# Patient Record
Sex: MALE | Race: WHITE | NOT HISPANIC OR LATINO | Employment: FULL TIME | URBAN - METROPOLITAN AREA
[De-identification: names, ages, dates, MRNs, and addresses within clinical notes are randomized per-mention and may not be internally consistent; named-entity substitution may affect disease eponyms.]

---

## 2016-12-30 RX ORDER — SODIUM CHLORIDE 9 MG/ML
20 INJECTION, SOLUTION INTRAVENOUS CONTINUOUS
Status: DISCONTINUED | OUTPATIENT
Start: 2017-01-03 | End: 2017-01-06 | Stop reason: HOSPADM

## 2017-01-03 ENCOUNTER — TRANSCRIBE ORDERS (OUTPATIENT)
Dept: ADMINISTRATIVE | Facility: HOSPITAL | Age: 57
End: 2017-01-03

## 2017-01-03 ENCOUNTER — APPOINTMENT (OUTPATIENT)
Dept: LAB | Facility: CLINIC | Age: 57
End: 2017-01-03
Payer: COMMERCIAL

## 2017-01-03 ENCOUNTER — ALLSCRIPTS OFFICE VISIT (OUTPATIENT)
Dept: OTHER | Facility: OTHER | Age: 57
End: 2017-01-03

## 2017-01-03 ENCOUNTER — HOSPITAL ENCOUNTER (OUTPATIENT)
Dept: INFUSION CENTER | Facility: CLINIC | Age: 57
Discharge: HOME | End: 2017-01-03
Payer: COMMERCIAL

## 2017-01-03 VITALS
BODY MASS INDEX: 30.62 KG/M2 | RESPIRATION RATE: 16 BRPM | WEIGHT: 238.6 LBS | TEMPERATURE: 97.8 F | DIASTOLIC BLOOD PRESSURE: 78 MMHG | SYSTOLIC BLOOD PRESSURE: 136 MMHG | HEART RATE: 86 BPM | HEIGHT: 74 IN

## 2017-01-03 DIAGNOSIS — C78.00 MALIGNANT NEOPLASM METASTATIC TO LUNG, UNSPECIFIED LATERALITY (HCC): Primary | ICD-10-CM

## 2017-01-03 DIAGNOSIS — C43.9 MALIGNANT MELANOMA OF SKIN (HCC): ICD-10-CM

## 2017-01-03 DIAGNOSIS — Z79.899 OTHER LONG TERM (CURRENT) DRUG THERAPY: ICD-10-CM

## 2017-01-03 LAB
ALBUMIN SERPL BCP-MCNC: 3.4 G/DL (ref 3.5–5)
ALP SERPL-CCNC: 79 U/L (ref 46–116)
ALT SERPL W P-5'-P-CCNC: 61 U/L (ref 12–78)
AMYLASE SERPL-CCNC: 23 IU/L (ref 25–115)
ANION GAP SERPL CALCULATED.3IONS-SCNC: 8 MMOL/L (ref 4–13)
AST SERPL W P-5'-P-CCNC: 40 U/L (ref 5–45)
BASOPHILS # BLD AUTO: 0.05 THOUSANDS/ΜL (ref 0–0.1)
BASOPHILS NFR BLD AUTO: 1 % (ref 0–1)
BILIRUB SERPL-MCNC: 0.3 MG/DL (ref 0.2–1)
BUN SERPL-MCNC: 9 MG/DL (ref 5–25)
CALCIUM SERPL-MCNC: 8.4 MG/DL (ref 8.3–10.1)
CHLORIDE SERPL-SCNC: 105 MMOL/L (ref 100–108)
CO2 SERPL-SCNC: 29 MMOL/L (ref 21–32)
CREAT SERPL-MCNC: 1.42 MG/DL (ref 0.6–1.3)
EOSINOPHIL # BLD AUTO: 0.76 THOUSAND/ΜL (ref 0–0.61)
EOSINOPHIL NFR BLD AUTO: 8 % (ref 0–6)
ERYTHROCYTE [DISTWIDTH] IN BLOOD BY AUTOMATED COUNT: 14.9 % (ref 11.6–15.1)
ERYTHROCYTE [SEDIMENTATION RATE] IN BLOOD: 5 MM/HOUR (ref 0–10)
GFR SERPL CREATININE-BSD FRML MDRD: 51.4 ML/MIN/1.73SQ M
GLUCOSE SERPL-MCNC: 106 MG/DL (ref 65–140)
HCT VFR BLD AUTO: 43.6 % (ref 36.5–49.3)
HGB BLD-MCNC: 14.3 G/DL (ref 12–17)
LDH SERPL-CCNC: 188 U/L (ref 81–234)
LIPASE SERPL-CCNC: 163 U/L (ref 73–393)
LYMPHOCYTES # BLD AUTO: 1.62 THOUSANDS/ΜL (ref 0.6–4.47)
LYMPHOCYTES NFR BLD AUTO: 18 % (ref 14–44)
MAGNESIUM SERPL-MCNC: 1.9 MG/DL (ref 1.6–2.6)
MCH RBC QN AUTO: 28.8 PG (ref 26.8–34.3)
MCHC RBC AUTO-ENTMCNC: 32.8 G/DL (ref 31.4–37.4)
MCV RBC AUTO: 88 FL (ref 82–98)
MONOCYTES # BLD AUTO: 0.67 THOUSAND/ΜL (ref 0.17–1.22)
MONOCYTES NFR BLD AUTO: 7 % (ref 4–12)
NEUTROPHILS # BLD AUTO: 6.05 THOUSANDS/ΜL (ref 1.85–7.62)
NEUTS SEG NFR BLD AUTO: 66 % (ref 43–75)
PLATELET # BLD AUTO: 353 THOUSANDS/UL (ref 149–390)
PMV BLD AUTO: 10 FL (ref 8.9–12.7)
POTASSIUM SERPL-SCNC: 3.6 MMOL/L (ref 3.5–5.3)
PROT SERPL-MCNC: 6.9 G/DL (ref 6.4–8.2)
RBC # BLD AUTO: 4.97 MILLION/UL (ref 3.88–5.62)
SODIUM SERPL-SCNC: 142 MMOL/L (ref 136–145)
T3FREE SERPL-MCNC: 1.67 PG/ML (ref 2.3–4.2)
T4 FREE SERPL-MCNC: 0.7 NG/DL (ref 0.76–1.46)
TSH SERPL DL<=0.05 MIU/L-ACNC: 34.38 UIU/ML (ref 0.36–3.74)
WBC # BLD AUTO: 9.15 THOUSAND/UL (ref 4.31–10.16)

## 2017-01-03 PROCEDURE — 84439 ASSAY OF FREE THYROXINE: CPT

## 2017-01-03 PROCEDURE — 36415 COLL VENOUS BLD VENIPUNCTURE: CPT

## 2017-01-03 PROCEDURE — 84443 ASSAY THYROID STIM HORMONE: CPT

## 2017-01-03 PROCEDURE — 85652 RBC SED RATE AUTOMATED: CPT

## 2017-01-03 PROCEDURE — 83615 LACTATE (LD) (LDH) ENZYME: CPT

## 2017-01-03 PROCEDURE — 82150 ASSAY OF AMYLASE: CPT

## 2017-01-03 PROCEDURE — 83690 ASSAY OF LIPASE: CPT

## 2017-01-03 PROCEDURE — 96413 CHEMO IV INFUSION 1 HR: CPT

## 2017-01-03 PROCEDURE — 84481 FREE ASSAY (FT-3): CPT

## 2017-01-03 PROCEDURE — 83735 ASSAY OF MAGNESIUM: CPT

## 2017-01-03 PROCEDURE — 85025 COMPLETE CBC W/AUTO DIFF WBC: CPT

## 2017-01-03 PROCEDURE — 80053 COMPREHEN METABOLIC PANEL: CPT

## 2017-01-03 RX ORDER — LEVOTHYROXINE SODIUM 0.2 MG/1
200 TABLET ORAL DAILY
COMMUNITY
End: 2019-10-15

## 2017-01-03 RX ADMIN — SODIUM CHLORIDE 20 ML/HR: 900 INJECTION, SOLUTION INTRAVENOUS at 12:35

## 2017-01-03 RX ADMIN — Medication 327 MG: at 13:15

## 2017-01-03 NOTE — PLAN OF CARE
Problem: Knowledge Deficit  Goal: Patient/family/caregiver demonstrates understanding of disease process, treatment plan, medications, and discharge instructions  Complete learning assessment and assess knowledge base    Interventions:  - Provide teaching at level of understanding  - Provide teaching via preferred learning methods  Outcome: Progressing

## 2017-01-03 NOTE — PROGRESS NOTES
Pt resting with no complaints  Vitals stable; labs within parameters for treatment  MD signed off labs with elevated TSH; pt reports MD is adjusting his levothyroxine  Callbell within reach; will continue to monitor

## 2017-01-10 ENCOUNTER — HOSPITAL ENCOUNTER (OUTPATIENT)
Dept: MRI IMAGING | Facility: HOSPITAL | Age: 57
Discharge: HOME | End: 2017-01-10
Payer: COMMERCIAL

## 2017-01-10 ENCOUNTER — HOSPITAL ENCOUNTER (OUTPATIENT)
Dept: CT IMAGING | Facility: HOSPITAL | Age: 57
Discharge: HOME | End: 2017-01-10
Payer: COMMERCIAL

## 2017-01-10 DIAGNOSIS — C43.9 MALIGNANT MELANOMA OF SKIN (HCC): ICD-10-CM

## 2017-01-10 DIAGNOSIS — C78.00 SECONDARY MALIGNANT NEOPLASM OF LUNG (HCC): ICD-10-CM

## 2017-01-10 DIAGNOSIS — C79.31 SECONDARY MALIGNANT NEOPLASM OF BRAIN (HCC): ICD-10-CM

## 2017-01-10 PROCEDURE — 74176 CT ABD & PELVIS W/O CONTRAST: CPT

## 2017-01-10 PROCEDURE — A9585 GADOBUTROL INJECTION: HCPCS | Performed by: PHYSICIAN ASSISTANT

## 2017-01-10 PROCEDURE — 71250 CT THORAX DX C-: CPT

## 2017-01-10 PROCEDURE — 70553 MRI BRAIN STEM W/O & W/DYE: CPT

## 2017-01-10 RX ADMIN — GADOBUTROL 10 ML: 604.72 INJECTION INTRAVENOUS at 16:22

## 2017-01-17 ENCOUNTER — HOSPITAL ENCOUNTER (OUTPATIENT)
Dept: INFUSION CENTER | Facility: CLINIC | Age: 57
Discharge: HOME | End: 2017-01-17
Payer: COMMERCIAL

## 2017-01-17 ENCOUNTER — ALLSCRIPTS OFFICE VISIT (OUTPATIENT)
Dept: OTHER | Facility: OTHER | Age: 57
End: 2017-01-17

## 2017-01-17 RX ORDER — SODIUM CHLORIDE 9 MG/ML
20 INJECTION, SOLUTION INTRAVENOUS CONTINUOUS
Status: DISCONTINUED | OUTPATIENT
Start: 2017-01-17 | End: 2017-01-20 | Stop reason: HOSPADM

## 2017-01-27 RX ORDER — SODIUM CHLORIDE 9 MG/ML
20 INJECTION, SOLUTION INTRAVENOUS CONTINUOUS
Status: DISCONTINUED | OUTPATIENT
Start: 2017-01-30 | End: 2017-02-02 | Stop reason: HOSPADM

## 2017-01-30 ENCOUNTER — HOSPITAL ENCOUNTER (OUTPATIENT)
Dept: INFUSION CENTER | Facility: CLINIC | Age: 57
Discharge: HOME | End: 2017-01-30
Payer: COMMERCIAL

## 2017-02-13 ENCOUNTER — HOSPITAL ENCOUNTER (OUTPATIENT)
Dept: INFUSION CENTER | Facility: CLINIC | Age: 57
Discharge: HOME | End: 2017-02-13
Payer: COMMERCIAL

## 2017-02-27 ENCOUNTER — ALLSCRIPTS OFFICE VISIT (OUTPATIENT)
Dept: OTHER | Facility: OTHER | Age: 57
End: 2017-02-27

## 2017-03-20 ENCOUNTER — TRANSCRIBE ORDERS (OUTPATIENT)
Dept: ADMINISTRATIVE | Facility: HOSPITAL | Age: 57
End: 2017-03-20

## 2017-03-20 DIAGNOSIS — C79.31 SECONDARY MALIGNANT NEOPLASM OF BRAIN AND SPINAL CORD (HCC): ICD-10-CM

## 2017-03-20 DIAGNOSIS — C79.49 SECONDARY MALIGNANT NEOPLASM OF BRAIN AND SPINAL CORD (HCC): ICD-10-CM

## 2017-03-20 DIAGNOSIS — C43.9 MELANOMA OF SKIN, SITE UNSPECIFIED: Primary | ICD-10-CM

## 2017-03-30 ENCOUNTER — APPOINTMENT (OUTPATIENT)
Dept: LAB | Facility: CLINIC | Age: 57
End: 2017-03-30
Payer: COMMERCIAL

## 2017-03-30 ENCOUNTER — ALLSCRIPTS OFFICE VISIT (OUTPATIENT)
Dept: OTHER | Facility: OTHER | Age: 57
End: 2017-03-30

## 2017-03-30 ENCOUNTER — HOSPITAL ENCOUNTER (OUTPATIENT)
Dept: INFUSION CENTER | Facility: CLINIC | Age: 57
Discharge: HOME | End: 2017-03-30
Payer: COMMERCIAL

## 2017-03-30 VITALS
RESPIRATION RATE: 18 BRPM | WEIGHT: 256 LBS | OXYGEN SATURATION: 95 % | BODY MASS INDEX: 32.87 KG/M2 | SYSTOLIC BLOOD PRESSURE: 124 MMHG | HEART RATE: 93 BPM | TEMPERATURE: 97.9 F | DIASTOLIC BLOOD PRESSURE: 78 MMHG

## 2017-03-30 DIAGNOSIS — C43.9 MALIGNANT MELANOMA OF SKIN (HCC): ICD-10-CM

## 2017-03-30 LAB
ALBUMIN SERPL BCP-MCNC: 3.9 G/DL (ref 3.5–5)
ALP SERPL-CCNC: 68 U/L (ref 46–116)
ALT SERPL W P-5'-P-CCNC: 39 U/L (ref 12–78)
AMYLASE SERPL-CCNC: 36 IU/L (ref 25–115)
ANION GAP SERPL CALCULATED.3IONS-SCNC: 7 MMOL/L (ref 4–13)
AST SERPL W P-5'-P-CCNC: 19 U/L (ref 5–45)
BASOPHILS # BLD AUTO: 0.02 THOUSANDS/ΜL (ref 0–0.1)
BASOPHILS NFR BLD AUTO: 0 % (ref 0–1)
BILIRUB SERPL-MCNC: 0.3 MG/DL (ref 0.2–1)
BUN SERPL-MCNC: 17 MG/DL (ref 5–25)
CALCIUM SERPL-MCNC: 9.7 MG/DL (ref 8.3–10.1)
CHLORIDE SERPL-SCNC: 102 MMOL/L (ref 100–108)
CO2 SERPL-SCNC: 32 MMOL/L (ref 21–32)
CREAT SERPL-MCNC: 1.25 MG/DL (ref 0.6–1.3)
EOSINOPHIL # BLD AUTO: 0.03 THOUSAND/ΜL (ref 0–0.61)
EOSINOPHIL NFR BLD AUTO: 0 % (ref 0–6)
ERYTHROCYTE [DISTWIDTH] IN BLOOD BY AUTOMATED COUNT: 14.3 % (ref 11.6–15.1)
GFR SERPL CREATININE-BSD FRML MDRD: 59.5 ML/MIN/1.73SQ M
GLUCOSE SERPL-MCNC: 96 MG/DL (ref 65–140)
HCT VFR BLD AUTO: 45.1 % (ref 36.5–49.3)
HGB BLD-MCNC: 14.6 G/DL (ref 12–17)
LDH SERPL-CCNC: 300 U/L (ref 81–234)
LIPASE SERPL-CCNC: 206 U/L (ref 73–393)
LYMPHOCYTES # BLD AUTO: 1.42 THOUSANDS/ΜL (ref 0.6–4.47)
LYMPHOCYTES NFR BLD AUTO: 11 % (ref 14–44)
MAGNESIUM SERPL-MCNC: 2 MG/DL (ref 1.6–2.6)
MCH RBC QN AUTO: 29.6 PG (ref 26.8–34.3)
MCHC RBC AUTO-ENTMCNC: 32.4 G/DL (ref 31.4–37.4)
MCV RBC AUTO: 92 FL (ref 82–98)
MONOCYTES # BLD AUTO: 0.8 THOUSAND/ΜL (ref 0.17–1.22)
MONOCYTES NFR BLD AUTO: 6 % (ref 4–12)
NEUTROPHILS # BLD AUTO: 10.25 THOUSANDS/ΜL (ref 1.85–7.62)
NEUTS SEG NFR BLD AUTO: 83 % (ref 43–75)
PLATELET # BLD AUTO: 348 THOUSANDS/UL (ref 149–390)
PMV BLD AUTO: 9.8 FL (ref 8.9–12.7)
POTASSIUM SERPL-SCNC: 4.7 MMOL/L (ref 3.5–5.3)
PROT SERPL-MCNC: 7.4 G/DL (ref 6.4–8.2)
RBC # BLD AUTO: 4.93 MILLION/UL (ref 3.88–5.62)
SODIUM SERPL-SCNC: 141 MMOL/L (ref 136–145)
T3FREE SERPL-MCNC: 2.7 PG/ML (ref 2.3–4.2)
T4 FREE SERPL-MCNC: 0.94 NG/DL (ref 0.76–1.46)
TSH SERPL DL<=0.05 MIU/L-ACNC: 2.2 UIU/ML (ref 0.36–3.74)
WBC # BLD AUTO: 12.52 THOUSAND/UL (ref 4.31–10.16)

## 2017-03-30 PROCEDURE — 84439 ASSAY OF FREE THYROXINE: CPT

## 2017-03-30 PROCEDURE — 84443 ASSAY THYROID STIM HORMONE: CPT

## 2017-03-30 PROCEDURE — 36415 COLL VENOUS BLD VENIPUNCTURE: CPT

## 2017-03-30 PROCEDURE — 96413 CHEMO IV INFUSION 1 HR: CPT

## 2017-03-30 PROCEDURE — 82150 ASSAY OF AMYLASE: CPT

## 2017-03-30 PROCEDURE — 80053 COMPREHEN METABOLIC PANEL: CPT

## 2017-03-30 PROCEDURE — 83615 LACTATE (LD) (LDH) ENZYME: CPT

## 2017-03-30 PROCEDURE — 83690 ASSAY OF LIPASE: CPT

## 2017-03-30 PROCEDURE — 83735 ASSAY OF MAGNESIUM: CPT

## 2017-03-30 PROCEDURE — 84481 FREE ASSAY (FT-3): CPT

## 2017-03-30 PROCEDURE — 85025 COMPLETE CBC W/AUTO DIFF WBC: CPT

## 2017-03-30 RX ORDER — SODIUM CHLORIDE 9 MG/ML
20 INJECTION, SOLUTION INTRAVENOUS CONTINUOUS
Status: DISCONTINUED | OUTPATIENT
Start: 2017-03-30 | End: 2017-04-02 | Stop reason: HOSPADM

## 2017-03-30 RX ADMIN — Medication 348 MG: at 12:23

## 2017-03-30 RX ADMIN — SODIUM CHLORIDE 20 ML/HR: 900 INJECTION, SOLUTION INTRAVENOUS at 11:30

## 2017-04-05 ENCOUNTER — HOSPITAL ENCOUNTER (OUTPATIENT)
Dept: MRI IMAGING | Facility: HOSPITAL | Age: 57
Discharge: HOME | End: 2017-04-05
Payer: COMMERCIAL

## 2017-04-05 ENCOUNTER — HOSPITAL ENCOUNTER (OUTPATIENT)
Dept: CT IMAGING | Facility: HOSPITAL | Age: 57
Discharge: HOME | End: 2017-04-05
Payer: COMMERCIAL

## 2017-04-05 DIAGNOSIS — C78.00 SECONDARY MALIGNANT NEOPLASM OF LUNG (HCC): ICD-10-CM

## 2017-04-05 DIAGNOSIS — C79.31 SECONDARY MALIGNANT NEOPLASM OF BRAIN (HCC): ICD-10-CM

## 2017-04-05 DIAGNOSIS — C43.9 MALIGNANT MELANOMA OF SKIN (HCC): ICD-10-CM

## 2017-04-05 PROCEDURE — 70553 MRI BRAIN STEM W/O & W/DYE: CPT

## 2017-04-05 PROCEDURE — 71250 CT THORAX DX C-: CPT

## 2017-04-05 PROCEDURE — A9585 GADOBUTROL INJECTION: HCPCS | Performed by: PHYSICIAN ASSISTANT

## 2017-04-05 PROCEDURE — 74176 CT ABD & PELVIS W/O CONTRAST: CPT

## 2017-04-05 RX ADMIN — GADOBUTROL 10 ML: 604.72 INJECTION INTRAVENOUS at 15:48

## 2017-04-11 ENCOUNTER — APPOINTMENT (OUTPATIENT)
Dept: LAB | Facility: CLINIC | Age: 57
End: 2017-04-11
Payer: COMMERCIAL

## 2017-04-11 ENCOUNTER — ALLSCRIPTS OFFICE VISIT (OUTPATIENT)
Dept: OTHER | Facility: OTHER | Age: 57
End: 2017-04-11

## 2017-04-11 ENCOUNTER — HOSPITAL ENCOUNTER (OUTPATIENT)
Dept: INFUSION CENTER | Facility: CLINIC | Age: 57
Discharge: HOME | End: 2017-04-11
Payer: COMMERCIAL

## 2017-04-11 VITALS
BODY MASS INDEX: 32.56 KG/M2 | RESPIRATION RATE: 20 BRPM | DIASTOLIC BLOOD PRESSURE: 82 MMHG | SYSTOLIC BLOOD PRESSURE: 142 MMHG | WEIGHT: 253.6 LBS | HEART RATE: 114 BPM | TEMPERATURE: 98.6 F

## 2017-04-11 DIAGNOSIS — C43.9 MALIGNANT MELANOMA OF SKIN (HCC): ICD-10-CM

## 2017-04-11 LAB
ALBUMIN SERPL BCP-MCNC: 3.9 G/DL (ref 3.5–5)
ALP SERPL-CCNC: 68 U/L (ref 46–116)
ALT SERPL W P-5'-P-CCNC: 35 U/L (ref 12–78)
AMYLASE SERPL-CCNC: 33 IU/L (ref 25–115)
ANION GAP SERPL CALCULATED.3IONS-SCNC: 8 MMOL/L (ref 4–13)
AST SERPL W P-5'-P-CCNC: 20 U/L (ref 5–45)
BASOPHILS # BLD AUTO: 0.03 THOUSANDS/ΜL (ref 0–0.1)
BASOPHILS NFR BLD AUTO: 0 % (ref 0–1)
BILIRUB SERPL-MCNC: 0.3 MG/DL (ref 0.2–1)
BUN SERPL-MCNC: 13 MG/DL (ref 5–25)
CALCIUM SERPL-MCNC: 9.4 MG/DL (ref 8.3–10.1)
CHLORIDE SERPL-SCNC: 103 MMOL/L (ref 100–108)
CO2 SERPL-SCNC: 30 MMOL/L (ref 21–32)
CREAT SERPL-MCNC: 1.22 MG/DL (ref 0.6–1.3)
EOSINOPHIL # BLD AUTO: 0.17 THOUSAND/ΜL (ref 0–0.61)
EOSINOPHIL NFR BLD AUTO: 1 % (ref 0–6)
ERYTHROCYTE [DISTWIDTH] IN BLOOD BY AUTOMATED COUNT: 14.3 % (ref 11.6–15.1)
ERYTHROCYTE [SEDIMENTATION RATE] IN BLOOD: 10 MM/HOUR (ref 0–10)
GFR SERPL CREATININE-BSD FRML MDRD: >60 ML/MIN/1.73SQ M
GLUCOSE SERPL-MCNC: 95 MG/DL (ref 65–140)
HCT VFR BLD AUTO: 43.9 % (ref 36.5–49.3)
HGB BLD-MCNC: 14.5 G/DL (ref 12–17)
LDH SERPL-CCNC: 263 U/L (ref 81–234)
LIPASE SERPL-CCNC: 199 U/L (ref 73–393)
LYMPHOCYTES # BLD AUTO: 1.2 THOUSANDS/ΜL (ref 0.6–4.47)
LYMPHOCYTES NFR BLD AUTO: 10 % (ref 14–44)
MAGNESIUM SERPL-MCNC: 2 MG/DL (ref 1.6–2.6)
MCH RBC QN AUTO: 30 PG (ref 26.8–34.3)
MCHC RBC AUTO-ENTMCNC: 33 G/DL (ref 31.4–37.4)
MCV RBC AUTO: 91 FL (ref 82–98)
MONOCYTES # BLD AUTO: 0.91 THOUSAND/ΜL (ref 0.17–1.22)
MONOCYTES NFR BLD AUTO: 7 % (ref 4–12)
NEUTROPHILS # BLD AUTO: 10.18 THOUSANDS/ΜL (ref 1.85–7.62)
NEUTS SEG NFR BLD AUTO: 82 % (ref 43–75)
PLATELET # BLD AUTO: 350 THOUSANDS/UL (ref 149–390)
PMV BLD AUTO: 10.2 FL (ref 8.9–12.7)
POTASSIUM SERPL-SCNC: 4.8 MMOL/L (ref 3.5–5.3)
PROT SERPL-MCNC: 7.6 G/DL (ref 6.4–8.2)
RBC # BLD AUTO: 4.84 MILLION/UL (ref 3.88–5.62)
SODIUM SERPL-SCNC: 141 MMOL/L (ref 136–145)
T3FREE SERPL-MCNC: 3.13 PG/ML (ref 2.3–4.2)
T4 FREE SERPL-MCNC: 1.13 NG/DL (ref 0.76–1.46)
TSH SERPL DL<=0.05 MIU/L-ACNC: 2.22 UIU/ML (ref 0.36–3.74)
WBC # BLD AUTO: 12.49 THOUSAND/UL (ref 4.31–10.16)

## 2017-04-11 PROCEDURE — 83735 ASSAY OF MAGNESIUM: CPT

## 2017-04-11 PROCEDURE — 84481 FREE ASSAY (FT-3): CPT

## 2017-04-11 PROCEDURE — 85652 RBC SED RATE AUTOMATED: CPT

## 2017-04-11 PROCEDURE — 83615 LACTATE (LD) (LDH) ENZYME: CPT

## 2017-04-11 PROCEDURE — 84443 ASSAY THYROID STIM HORMONE: CPT

## 2017-04-11 PROCEDURE — 82150 ASSAY OF AMYLASE: CPT

## 2017-04-11 PROCEDURE — 80053 COMPREHEN METABOLIC PANEL: CPT

## 2017-04-11 PROCEDURE — 36415 COLL VENOUS BLD VENIPUNCTURE: CPT

## 2017-04-11 PROCEDURE — 96413 CHEMO IV INFUSION 1 HR: CPT

## 2017-04-11 PROCEDURE — 85025 COMPLETE CBC W/AUTO DIFF WBC: CPT

## 2017-04-11 PROCEDURE — 84439 ASSAY OF FREE THYROXINE: CPT

## 2017-04-11 PROCEDURE — 83690 ASSAY OF LIPASE: CPT

## 2017-04-11 RX ORDER — PREDNISONE 1 MG/1
5 TABLET ORAL DAILY
COMMUNITY
End: 2017-05-23 | Stop reason: ALTCHOICE

## 2017-04-11 RX ORDER — SODIUM CHLORIDE 9 MG/ML
20 INJECTION, SOLUTION INTRAVENOUS CONTINUOUS
Status: DISCONTINUED | OUTPATIENT
Start: 2017-04-11 | End: 2017-04-14 | Stop reason: HOSPADM

## 2017-04-11 RX ADMIN — SODIUM CHLORIDE 20 ML/HR: 0.9 INJECTION, SOLUTION INTRAVENOUS at 10:45

## 2017-04-11 RX ADMIN — Medication 347 MG: at 11:20

## 2017-04-24 ENCOUNTER — ALLSCRIPTS OFFICE VISIT (OUTPATIENT)
Dept: OTHER | Facility: OTHER | Age: 57
End: 2017-04-24

## 2017-04-24 ENCOUNTER — TRANSCRIBE ORDERS (OUTPATIENT)
Dept: LAB | Facility: CLINIC | Age: 57
End: 2017-04-24

## 2017-04-24 ENCOUNTER — HOSPITAL ENCOUNTER (OUTPATIENT)
Dept: INFUSION CENTER | Facility: CLINIC | Age: 57
Discharge: HOME | End: 2017-04-24
Payer: COMMERCIAL

## 2017-04-24 ENCOUNTER — APPOINTMENT (OUTPATIENT)
Dept: LAB | Facility: CLINIC | Age: 57
End: 2017-04-24
Payer: COMMERCIAL

## 2017-04-24 VITALS
DIASTOLIC BLOOD PRESSURE: 86 MMHG | TEMPERATURE: 98.6 F | WEIGHT: 258.4 LBS | HEART RATE: 100 BPM | BODY MASS INDEX: 33.18 KG/M2 | OXYGEN SATURATION: 95 % | SYSTOLIC BLOOD PRESSURE: 158 MMHG | RESPIRATION RATE: 16 BRPM

## 2017-04-24 DIAGNOSIS — C43.9 MALIGNANT MELANOMA, UNSPECIFIED SITE (HCC): Primary | ICD-10-CM

## 2017-04-24 DIAGNOSIS — C79.49 SECONDARY MALIGNANT NEOPLASM OF BRAIN AND SPINAL CORD (HCC): ICD-10-CM

## 2017-04-24 DIAGNOSIS — C43.9 MALIGNANT MELANOMA OF SKIN (HCC): ICD-10-CM

## 2017-04-24 DIAGNOSIS — C79.31 SECONDARY MALIGNANT NEOPLASM OF BRAIN AND SPINAL CORD (HCC): ICD-10-CM

## 2017-04-24 DIAGNOSIS — C78.00 MALIGNANT NEOPLASM METASTATIC TO LUNG, UNSPECIFIED LATERALITY (HCC): ICD-10-CM

## 2017-04-24 LAB
ALBUMIN SERPL BCP-MCNC: 3.7 G/DL (ref 3.5–5)
ALP SERPL-CCNC: 66 U/L (ref 46–116)
ALT SERPL W P-5'-P-CCNC: 36 U/L (ref 12–78)
AMYLASE SERPL-CCNC: 28 IU/L (ref 25–115)
ANION GAP SERPL CALCULATED.3IONS-SCNC: 9 MMOL/L (ref 4–13)
AST SERPL W P-5'-P-CCNC: 26 U/L (ref 5–45)
BASOPHILS # BLD AUTO: 0.03 THOUSANDS/ΜL (ref 0–0.1)
BASOPHILS NFR BLD AUTO: 0 % (ref 0–1)
BILIRUB SERPL-MCNC: 0.5 MG/DL (ref 0.2–1)
BUN SERPL-MCNC: 10 MG/DL (ref 5–25)
CALCIUM SERPL-MCNC: 9.1 MG/DL (ref 8.3–10.1)
CHLORIDE SERPL-SCNC: 104 MMOL/L (ref 100–108)
CO2 SERPL-SCNC: 28 MMOL/L (ref 21–32)
CREAT SERPL-MCNC: 1.28 MG/DL (ref 0.6–1.3)
EOSINOPHIL # BLD AUTO: 0.39 THOUSAND/ΜL (ref 0–0.61)
EOSINOPHIL NFR BLD AUTO: 5 % (ref 0–6)
ERYTHROCYTE [DISTWIDTH] IN BLOOD BY AUTOMATED COUNT: 13.9 % (ref 11.6–15.1)
ERYTHROCYTE [SEDIMENTATION RATE] IN BLOOD: 17 MM/HOUR (ref 0–10)
GFR SERPL CREATININE-BSD FRML MDRD: 57.9 ML/MIN/1.73SQ M
GLUCOSE SERPL-MCNC: 106 MG/DL (ref 65–140)
HCT VFR BLD AUTO: 42.1 % (ref 36.5–49.3)
HGB BLD-MCNC: 13.9 G/DL (ref 12–17)
LDH SERPL-CCNC: 247 U/L (ref 81–234)
LIPASE SERPL-CCNC: 185 U/L (ref 73–393)
LYMPHOCYTES # BLD AUTO: 1.34 THOUSANDS/ΜL (ref 0.6–4.47)
LYMPHOCYTES NFR BLD AUTO: 17 % (ref 14–44)
MAGNESIUM SERPL-MCNC: 1.9 MG/DL (ref 1.6–2.6)
MCH RBC QN AUTO: 29.8 PG (ref 26.8–34.3)
MCHC RBC AUTO-ENTMCNC: 33 G/DL (ref 31.4–37.4)
MCV RBC AUTO: 90 FL (ref 82–98)
MONOCYTES # BLD AUTO: 1.06 THOUSAND/ΜL (ref 0.17–1.22)
MONOCYTES NFR BLD AUTO: 14 % (ref 4–12)
NEUTROPHILS # BLD AUTO: 5.06 THOUSANDS/ΜL (ref 1.85–7.62)
NEUTS SEG NFR BLD AUTO: 64 % (ref 43–75)
PLATELET # BLD AUTO: 311 THOUSANDS/UL (ref 149–390)
PMV BLD AUTO: 10.3 FL (ref 8.9–12.7)
POTASSIUM SERPL-SCNC: 4.1 MMOL/L (ref 3.5–5.3)
PROT SERPL-MCNC: 7.1 G/DL (ref 6.4–8.2)
RBC # BLD AUTO: 4.67 MILLION/UL (ref 3.88–5.62)
SODIUM SERPL-SCNC: 141 MMOL/L (ref 136–145)
T3FREE SERPL-MCNC: 2.79 PG/ML (ref 2.3–4.2)
T4 FREE SERPL-MCNC: 0.81 NG/DL (ref 0.76–1.46)
TSH SERPL DL<=0.05 MIU/L-ACNC: 4.31 UIU/ML (ref 0.36–3.74)
WBC # BLD AUTO: 7.88 THOUSAND/UL (ref 4.31–10.16)

## 2017-04-24 PROCEDURE — 80053 COMPREHEN METABOLIC PANEL: CPT

## 2017-04-24 PROCEDURE — 84443 ASSAY THYROID STIM HORMONE: CPT

## 2017-04-24 PROCEDURE — 96413 CHEMO IV INFUSION 1 HR: CPT

## 2017-04-24 PROCEDURE — 36415 COLL VENOUS BLD VENIPUNCTURE: CPT

## 2017-04-24 PROCEDURE — 84439 ASSAY OF FREE THYROXINE: CPT

## 2017-04-24 PROCEDURE — 82150 ASSAY OF AMYLASE: CPT

## 2017-04-24 PROCEDURE — 85025 COMPLETE CBC W/AUTO DIFF WBC: CPT

## 2017-04-24 PROCEDURE — 83735 ASSAY OF MAGNESIUM: CPT

## 2017-04-24 PROCEDURE — 85652 RBC SED RATE AUTOMATED: CPT

## 2017-04-24 PROCEDURE — 83615 LACTATE (LD) (LDH) ENZYME: CPT

## 2017-04-24 PROCEDURE — 84481 FREE ASSAY (FT-3): CPT

## 2017-04-24 PROCEDURE — 83690 ASSAY OF LIPASE: CPT

## 2017-04-24 RX ORDER — SODIUM CHLORIDE 9 MG/ML
20 INJECTION, SOLUTION INTRAVENOUS CONTINUOUS
Status: DISCONTINUED | OUTPATIENT
Start: 2017-04-24 | End: 2017-04-27 | Stop reason: HOSPADM

## 2017-04-24 RX ADMIN — SODIUM CHLORIDE 20 ML/HR: 0.9 INJECTION, SOLUTION INTRAVENOUS at 12:22

## 2017-04-24 RX ADMIN — Medication 352 MG: at 13:16

## 2017-04-24 NOTE — PROGRESS NOTES
Pt is here for chemo  He offers no complaints at this time  Labs meet parameters  TSH did increase to 4 3  Notified Trupti Burrell Rn and she stated they are aware and he is ok for treatment today   Pt did see the  this am prior to infusion appointment

## 2017-05-09 ENCOUNTER — HOSPITAL ENCOUNTER (OUTPATIENT)
Dept: INFUSION CENTER | Facility: CLINIC | Age: 57
Discharge: HOME | End: 2017-05-09
Payer: COMMERCIAL

## 2017-05-09 ENCOUNTER — ALLSCRIPTS OFFICE VISIT (OUTPATIENT)
Dept: OTHER | Facility: OTHER | Age: 57
End: 2017-05-09

## 2017-05-09 VITALS
SYSTOLIC BLOOD PRESSURE: 142 MMHG | DIASTOLIC BLOOD PRESSURE: 80 MMHG | WEIGHT: 257.5 LBS | TEMPERATURE: 98 F | HEART RATE: 104 BPM | RESPIRATION RATE: 16 BRPM | BODY MASS INDEX: 33.06 KG/M2 | OXYGEN SATURATION: 96 %

## 2017-05-09 PROCEDURE — 96413 CHEMO IV INFUSION 1 HR: CPT

## 2017-05-09 RX ORDER — SODIUM CHLORIDE 9 MG/ML
20 INJECTION, SOLUTION INTRAVENOUS CONTINUOUS
Status: DISCONTINUED | OUTPATIENT
Start: 2017-05-09 | End: 2017-05-12 | Stop reason: HOSPADM

## 2017-05-09 RX ADMIN — Medication 350 MG: at 13:15

## 2017-05-09 RX ADMIN — SODIUM CHLORIDE 20 ML/HR: 0.9 INJECTION, SOLUTION INTRAVENOUS at 12:00

## 2017-05-23 ENCOUNTER — ALLSCRIPTS OFFICE VISIT (OUTPATIENT)
Dept: OTHER | Facility: OTHER | Age: 57
End: 2017-05-23

## 2017-05-23 ENCOUNTER — HOSPITAL ENCOUNTER (OUTPATIENT)
Dept: INFUSION CENTER | Facility: CLINIC | Age: 57
Discharge: HOME | End: 2017-05-23
Payer: COMMERCIAL

## 2017-05-23 ENCOUNTER — APPOINTMENT (OUTPATIENT)
Dept: LAB | Facility: CLINIC | Age: 57
End: 2017-05-23
Payer: COMMERCIAL

## 2017-05-23 VITALS
HEART RATE: 96 BPM | SYSTOLIC BLOOD PRESSURE: 136 MMHG | WEIGHT: 255.4 LBS | TEMPERATURE: 98.1 F | BODY MASS INDEX: 32.79 KG/M2 | RESPIRATION RATE: 16 BRPM | DIASTOLIC BLOOD PRESSURE: 74 MMHG | OXYGEN SATURATION: 95 %

## 2017-05-23 DIAGNOSIS — C43.9 MALIGNANT MELANOMA OF SKIN (HCC): ICD-10-CM

## 2017-05-23 LAB
ALBUMIN SERPL BCP-MCNC: 3.7 G/DL (ref 3.5–5)
ALP SERPL-CCNC: 72 U/L (ref 46–116)
ALT SERPL W P-5'-P-CCNC: 27 U/L (ref 12–78)
AMYLASE SERPL-CCNC: 30 IU/L (ref 25–115)
ANION GAP SERPL CALCULATED.3IONS-SCNC: 8 MMOL/L (ref 4–13)
AST SERPL W P-5'-P-CCNC: 21 U/L (ref 5–45)
BASOPHILS # BLD AUTO: 0.03 THOUSANDS/ΜL (ref 0–0.1)
BASOPHILS NFR BLD AUTO: 1 % (ref 0–1)
BILIRUB SERPL-MCNC: 0.4 MG/DL (ref 0.2–1)
BUN SERPL-MCNC: 13 MG/DL (ref 5–25)
CALCIUM SERPL-MCNC: 9.2 MG/DL (ref 8.3–10.1)
CHLORIDE SERPL-SCNC: 105 MMOL/L (ref 100–108)
CO2 SERPL-SCNC: 29 MMOL/L (ref 21–32)
CREAT SERPL-MCNC: 1.2 MG/DL (ref 0.6–1.3)
EOSINOPHIL # BLD AUTO: 0.9 THOUSAND/ΜL (ref 0–0.61)
EOSINOPHIL NFR BLD AUTO: 14 % (ref 0–6)
ERYTHROCYTE [DISTWIDTH] IN BLOOD BY AUTOMATED COUNT: 13.1 % (ref 11.6–15.1)
ERYTHROCYTE [SEDIMENTATION RATE] IN BLOOD: 14 MM/HOUR (ref 0–10)
GFR SERPL CREATININE-BSD FRML MDRD: >60 ML/MIN/1.73SQ M
GLUCOSE SERPL-MCNC: 86 MG/DL (ref 65–140)
HCT VFR BLD AUTO: 42.4 % (ref 36.5–49.3)
HGB BLD-MCNC: 14.1 G/DL (ref 12–17)
LDH SERPL-CCNC: 228 U/L (ref 81–234)
LIPASE SERPL-CCNC: 228 U/L (ref 73–393)
LYMPHOCYTES # BLD AUTO: 1.34 THOUSANDS/ΜL (ref 0.6–4.47)
LYMPHOCYTES NFR BLD AUTO: 20 % (ref 14–44)
MAGNESIUM SERPL-MCNC: 2 MG/DL (ref 1.6–2.6)
MCH RBC QN AUTO: 29.5 PG (ref 26.8–34.3)
MCHC RBC AUTO-ENTMCNC: 33.3 G/DL (ref 31.4–37.4)
MCV RBC AUTO: 89 FL (ref 82–98)
MONOCYTES # BLD AUTO: 0.77 THOUSAND/ΜL (ref 0.17–1.22)
MONOCYTES NFR BLD AUTO: 12 % (ref 4–12)
NEUTROPHILS # BLD AUTO: 3.61 THOUSANDS/ΜL (ref 1.85–7.62)
NEUTS SEG NFR BLD AUTO: 53 % (ref 43–75)
PLATELET # BLD AUTO: 319 THOUSANDS/UL (ref 149–390)
PMV BLD AUTO: 10.4 FL (ref 8.9–12.7)
POTASSIUM SERPL-SCNC: 3.9 MMOL/L (ref 3.5–5.3)
PROT SERPL-MCNC: 7.3 G/DL (ref 6.4–8.2)
RBC # BLD AUTO: 4.78 MILLION/UL (ref 3.88–5.62)
SODIUM SERPL-SCNC: 142 MMOL/L (ref 136–145)
T3FREE SERPL-MCNC: 3.24 PG/ML (ref 2.3–4.2)
T4 FREE SERPL-MCNC: 0.91 NG/DL (ref 0.76–1.46)
TSH SERPL DL<=0.05 MIU/L-ACNC: 0.79 UIU/ML (ref 0.36–3.74)
WBC # BLD AUTO: 6.65 THOUSAND/UL (ref 4.31–10.16)

## 2017-05-23 PROCEDURE — 85025 COMPLETE CBC W/AUTO DIFF WBC: CPT

## 2017-05-23 PROCEDURE — 83615 LACTATE (LD) (LDH) ENZYME: CPT

## 2017-05-23 PROCEDURE — 84443 ASSAY THYROID STIM HORMONE: CPT

## 2017-05-23 PROCEDURE — 83690 ASSAY OF LIPASE: CPT

## 2017-05-23 PROCEDURE — 83735 ASSAY OF MAGNESIUM: CPT

## 2017-05-23 PROCEDURE — 85652 RBC SED RATE AUTOMATED: CPT

## 2017-05-23 PROCEDURE — 84439 ASSAY OF FREE THYROXINE: CPT

## 2017-05-23 PROCEDURE — 80053 COMPREHEN METABOLIC PANEL: CPT

## 2017-05-23 PROCEDURE — 84481 FREE ASSAY (FT-3): CPT

## 2017-05-23 PROCEDURE — 82150 ASSAY OF AMYLASE: CPT

## 2017-05-23 PROCEDURE — 96413 CHEMO IV INFUSION 1 HR: CPT

## 2017-05-23 PROCEDURE — 36415 COLL VENOUS BLD VENIPUNCTURE: CPT

## 2017-05-23 RX ORDER — SODIUM CHLORIDE 9 MG/ML
20 INJECTION, SOLUTION INTRAVENOUS CONTINUOUS
Status: DISCONTINUED | OUTPATIENT
Start: 2017-05-23 | End: 2017-05-26 | Stop reason: HOSPADM

## 2017-05-23 RX ADMIN — SODIUM CHLORIDE 20 ML/HR: 0.9 INJECTION, SOLUTION INTRAVENOUS at 11:33

## 2017-05-23 RX ADMIN — Medication 349 MG: at 12:13

## 2017-05-23 NOTE — PROGRESS NOTES
Pt  Tolerated Nivolumab (Clinical Trial) without adverse event  Future appointments reviewed   Declined AVS

## 2017-05-23 NOTE — PLAN OF CARE
Problem: PAIN - ADULT  Goal: Verbalizes/displays adequate comfort level or baseline comfort level  Interventions:  - Encourage patient to monitor pain and request assistance  - Assess pain using appropriate pain scale  - Administer analgesics based on type and severity of pain and evaluate response  - Implement non-pharmacological measures as appropriate and evaluate response  - Consider cultural and social influences on pain and pain management  - Notify physician/advanced practitioner if interventions unsuccessful or patient reports new pain  Outcome: Progressing    Problem: INFECTION - ADULT  Goal: Absence or prevention of progression during hospitalization  INTERVENTIONS:  - Assess and monitor for signs and symptoms of infection  - Monitor lab/diagnostic results  - Monitor all insertion sites, i e  indwelling lines, tubes, and drains  - Monitor endotracheal (as able) and nasal secretions for changes in amount and color  - [place] appropriate cooling/warming therapies per order  - Administer medications as ordered  - Instruct and encourage patient and family to use good hand hygiene technique  - Identify and instruct in appropriate isolation precautions for identified infection/condition  Outcome: Progressing  Goal: Absence of fever/infection during neutropenic period  INTERVENTIONS:  - Monitor WBC  - Implement neutropenic guidelines  Outcome: Progressing    Problem: Knowledge Deficit  Goal: Patient/family/caregiver demonstrates understanding of disease process, treatment plan, medications, and discharge instructions  Complete learning assessment and assess knowledge base    Interventions:  - Provide teaching at level of understanding  - Provide teaching via preferred learning methods  Outcome: Progressing

## 2017-05-23 NOTE — PROGRESS NOTES
Pt  Denies new symptoms or concerns  Labs reviewed and within parameters for treatment today  Spoke with Shreyas VELAZQUEZ to proceed with Trial/Nivolumab at this time

## 2017-06-05 ENCOUNTER — APPOINTMENT (OUTPATIENT)
Dept: LAB | Facility: CLINIC | Age: 57
End: 2017-06-05
Payer: COMMERCIAL

## 2017-06-05 ENCOUNTER — ALLSCRIPTS OFFICE VISIT (OUTPATIENT)
Dept: OTHER | Facility: OTHER | Age: 57
End: 2017-06-05

## 2017-06-05 ENCOUNTER — TRANSCRIBE ORDERS (OUTPATIENT)
Dept: LAB | Facility: CLINIC | Age: 57
End: 2017-06-05

## 2017-06-05 ENCOUNTER — HOSPITAL ENCOUNTER (OUTPATIENT)
Dept: INFUSION CENTER | Facility: CLINIC | Age: 57
Discharge: HOME | End: 2017-06-05
Payer: COMMERCIAL

## 2017-06-05 VITALS
RESPIRATION RATE: 16 BRPM | WEIGHT: 254.2 LBS | BODY MASS INDEX: 32.64 KG/M2 | SYSTOLIC BLOOD PRESSURE: 150 MMHG | HEART RATE: 91 BPM | OXYGEN SATURATION: 96 % | TEMPERATURE: 98.3 F | DIASTOLIC BLOOD PRESSURE: 80 MMHG

## 2017-06-05 DIAGNOSIS — C43.9 MALIGNANT MELANOMA OF SKIN (HCC): ICD-10-CM

## 2017-06-05 LAB
ALBUMIN SERPL BCP-MCNC: 3.6 G/DL (ref 3.5–5)
ALP SERPL-CCNC: 70 U/L (ref 46–116)
ALT SERPL W P-5'-P-CCNC: 25 U/L (ref 12–78)
AMYLASE SERPL-CCNC: 29 IU/L (ref 25–115)
ANION GAP SERPL CALCULATED.3IONS-SCNC: 8 MMOL/L (ref 4–13)
AST SERPL W P-5'-P-CCNC: 15 U/L (ref 5–45)
BASOPHILS # BLD AUTO: 0.04 THOUSANDS/ΜL (ref 0–0.1)
BASOPHILS NFR BLD AUTO: 1 % (ref 0–1)
BILIRUB SERPL-MCNC: 0.2 MG/DL (ref 0.2–1)
BUN SERPL-MCNC: 14 MG/DL (ref 5–25)
CALCIUM SERPL-MCNC: 8.9 MG/DL (ref 8.3–10.1)
CHLORIDE SERPL-SCNC: 107 MMOL/L (ref 100–108)
CO2 SERPL-SCNC: 29 MMOL/L (ref 21–32)
CREAT SERPL-MCNC: 1.21 MG/DL (ref 0.6–1.3)
EOSINOPHIL # BLD AUTO: 0.78 THOUSAND/ΜL (ref 0–0.61)
EOSINOPHIL NFR BLD AUTO: 10 % (ref 0–6)
ERYTHROCYTE [DISTWIDTH] IN BLOOD BY AUTOMATED COUNT: 12.9 % (ref 11.6–15.1)
ERYTHROCYTE [SEDIMENTATION RATE] IN BLOOD: 4 MM/HOUR (ref 0–10)
GFR SERPL CREATININE-BSD FRML MDRD: >60 ML/MIN/1.73SQ M
GLUCOSE SERPL-MCNC: 95 MG/DL (ref 65–140)
HCT VFR BLD AUTO: 42.2 % (ref 36.5–49.3)
HGB BLD-MCNC: 13.7 G/DL (ref 12–17)
LDH SERPL-CCNC: 215 U/L (ref 81–234)
LIPASE SERPL-CCNC: 220 U/L (ref 73–393)
LYMPHOCYTES # BLD AUTO: 1.29 THOUSANDS/ΜL (ref 0.6–4.47)
LYMPHOCYTES NFR BLD AUTO: 17 % (ref 14–44)
MAGNESIUM SERPL-MCNC: 1.9 MG/DL (ref 1.6–2.6)
MCH RBC QN AUTO: 29 PG (ref 26.8–34.3)
MCHC RBC AUTO-ENTMCNC: 32.5 G/DL (ref 31.4–37.4)
MCV RBC AUTO: 89 FL (ref 82–98)
MONOCYTES # BLD AUTO: 0.81 THOUSAND/ΜL (ref 0.17–1.22)
MONOCYTES NFR BLD AUTO: 11 % (ref 4–12)
NEUTROPHILS # BLD AUTO: 4.83 THOUSANDS/ΜL (ref 1.85–7.62)
NEUTS SEG NFR BLD AUTO: 61 % (ref 43–75)
PLATELET # BLD AUTO: 324 THOUSANDS/UL (ref 149–390)
PMV BLD AUTO: 10.1 FL (ref 8.9–12.7)
POTASSIUM SERPL-SCNC: 4.4 MMOL/L (ref 3.5–5.3)
PROT SERPL-MCNC: 6.9 G/DL (ref 6.4–8.2)
RBC # BLD AUTO: 4.72 MILLION/UL (ref 3.88–5.62)
SODIUM SERPL-SCNC: 144 MMOL/L (ref 136–145)
T3FREE SERPL-MCNC: 3.21 PG/ML (ref 2.3–4.2)
T4 FREE SERPL-MCNC: 0.96 NG/DL (ref 0.76–1.46)
TSH SERPL DL<=0.05 MIU/L-ACNC: 0.37 UIU/ML (ref 0.36–3.74)
WBC # BLD AUTO: 7.75 THOUSAND/UL (ref 4.31–10.16)

## 2017-06-05 PROCEDURE — 82150 ASSAY OF AMYLASE: CPT

## 2017-06-05 PROCEDURE — 84443 ASSAY THYROID STIM HORMONE: CPT

## 2017-06-05 PROCEDURE — 83690 ASSAY OF LIPASE: CPT

## 2017-06-05 PROCEDURE — 80053 COMPREHEN METABOLIC PANEL: CPT

## 2017-06-05 PROCEDURE — 83615 LACTATE (LD) (LDH) ENZYME: CPT

## 2017-06-05 PROCEDURE — 84481 FREE ASSAY (FT-3): CPT

## 2017-06-05 PROCEDURE — 84439 ASSAY OF FREE THYROXINE: CPT

## 2017-06-05 PROCEDURE — 85652 RBC SED RATE AUTOMATED: CPT

## 2017-06-05 PROCEDURE — 36415 COLL VENOUS BLD VENIPUNCTURE: CPT

## 2017-06-05 PROCEDURE — 85025 COMPLETE CBC W/AUTO DIFF WBC: CPT

## 2017-06-05 PROCEDURE — 83735 ASSAY OF MAGNESIUM: CPT

## 2017-06-05 PROCEDURE — 96413 CHEMO IV INFUSION 1 HR: CPT

## 2017-06-05 RX ORDER — SODIUM CHLORIDE 9 MG/ML
20 INJECTION, SOLUTION INTRAVENOUS CONTINUOUS
Status: DISCONTINUED | OUTPATIENT
Start: 2017-06-05 | End: 2017-06-08 | Stop reason: HOSPADM

## 2017-06-05 RX ADMIN — Medication 349 MG: at 12:11

## 2017-06-05 RX ADMIN — SODIUM CHLORIDE 20 ML/HR: 900 INJECTION, SOLUTION INTRAVENOUS at 11:15

## 2017-06-05 NOTE — PROGRESS NOTES
Pt resting with no complaints  Vitals stable; labs within parameters for treatment  Pt expressing interest in getting a port secondary to frequently having difficulty obtaining an IV  Per Ann Gonzalez, this will need to be facilitated by pt's primary oncologist  Pt aware and will call  Callbell within reach; will continue to monitor

## 2017-06-17 RX ORDER — SODIUM CHLORIDE 9 MG/ML
20 INJECTION, SOLUTION INTRAVENOUS CONTINUOUS
Status: DISCONTINUED | OUTPATIENT
Start: 2017-06-19 | End: 2017-06-22 | Stop reason: HOSPADM

## 2017-06-19 ENCOUNTER — ALLSCRIPTS OFFICE VISIT (OUTPATIENT)
Dept: OTHER | Facility: OTHER | Age: 57
End: 2017-06-19

## 2017-06-19 ENCOUNTER — HOSPITAL ENCOUNTER (OUTPATIENT)
Dept: INFUSION CENTER | Facility: CLINIC | Age: 57
Discharge: HOME | End: 2017-06-19
Payer: COMMERCIAL

## 2017-06-19 VITALS
HEART RATE: 108 BPM | RESPIRATION RATE: 18 BRPM | OXYGEN SATURATION: 94 % | TEMPERATURE: 98.5 F | WEIGHT: 251.54 LBS | SYSTOLIC BLOOD PRESSURE: 152 MMHG | DIASTOLIC BLOOD PRESSURE: 85 MMHG | BODY MASS INDEX: 32.3 KG/M2

## 2017-06-19 PROCEDURE — 96413 CHEMO IV INFUSION 1 HR: CPT

## 2017-06-19 RX ADMIN — Medication 300 UNITS: at 13:40

## 2017-06-19 RX ADMIN — Medication 342 MG: at 12:35

## 2017-06-19 RX ADMIN — SODIUM CHLORIDE 20 ML/HR: 0.9 INJECTION, SOLUTION INTRAVENOUS at 12:10

## 2017-06-28 ENCOUNTER — HOSPITAL ENCOUNTER (OUTPATIENT)
Dept: CT IMAGING | Facility: HOSPITAL | Age: 57
Discharge: HOME | End: 2017-06-28
Payer: COMMERCIAL

## 2017-06-28 ENCOUNTER — HOSPITAL ENCOUNTER (OUTPATIENT)
Dept: MRI IMAGING | Facility: HOSPITAL | Age: 57
Discharge: HOME | End: 2017-06-28
Payer: COMMERCIAL

## 2017-06-28 DIAGNOSIS — C79.31 SECONDARY MALIGNANT NEOPLASM OF BRAIN (HCC): ICD-10-CM

## 2017-06-28 DIAGNOSIS — C43.9 MALIGNANT MELANOMA OF SKIN (HCC): ICD-10-CM

## 2017-06-28 DIAGNOSIS — C78.00 SECONDARY MALIGNANT NEOPLASM OF LUNG (HCC): ICD-10-CM

## 2017-06-28 PROCEDURE — 74176 CT ABD & PELVIS W/O CONTRAST: CPT

## 2017-06-28 PROCEDURE — 71250 CT THORAX DX C-: CPT

## 2017-06-28 PROCEDURE — A9585 GADOBUTROL INJECTION: HCPCS | Performed by: PHYSICIAN ASSISTANT

## 2017-06-28 PROCEDURE — 70553 MRI BRAIN STEM W/O & W/DYE: CPT

## 2017-06-28 RX ADMIN — GADOBUTROL 10 ML: 604.72 INJECTION INTRAVENOUS at 15:05

## 2017-07-05 ENCOUNTER — ALLSCRIPTS OFFICE VISIT (OUTPATIENT)
Dept: OTHER | Facility: OTHER | Age: 57
End: 2017-07-05

## 2017-07-05 ENCOUNTER — HOSPITAL ENCOUNTER (OUTPATIENT)
Dept: INFUSION CENTER | Facility: CLINIC | Age: 57
Discharge: HOME | End: 2017-07-05
Payer: COMMERCIAL

## 2017-07-05 VITALS
RESPIRATION RATE: 18 BRPM | HEART RATE: 74 BPM | HEIGHT: 73 IN | BODY MASS INDEX: 32.1 KG/M2 | TEMPERATURE: 97.7 F | SYSTOLIC BLOOD PRESSURE: 150 MMHG | DIASTOLIC BLOOD PRESSURE: 71 MMHG | OXYGEN SATURATION: 98 % | WEIGHT: 242.2 LBS

## 2017-07-05 LAB
ALBUMIN SERPL BCP-MCNC: 3.4 G/DL (ref 3.5–5)
ALP SERPL-CCNC: 84 U/L (ref 46–116)
ALT SERPL W P-5'-P-CCNC: 33 U/L (ref 12–78)
AMYLASE SERPL-CCNC: 30 IU/L (ref 25–115)
ANION GAP SERPL CALCULATED.3IONS-SCNC: 9 MMOL/L (ref 4–13)
AST SERPL W P-5'-P-CCNC: 19 U/L (ref 5–45)
BASOPHILS # BLD AUTO: 0.05 THOUSANDS/ΜL (ref 0–0.1)
BASOPHILS NFR BLD AUTO: 1 % (ref 0–1)
BILIRUB SERPL-MCNC: 0.2 MG/DL (ref 0.2–1)
BUN SERPL-MCNC: 12 MG/DL (ref 5–25)
CALCIUM SERPL-MCNC: 8.6 MG/DL (ref 8.3–10.1)
CHLORIDE SERPL-SCNC: 106 MMOL/L (ref 100–108)
CO2 SERPL-SCNC: 27 MMOL/L (ref 21–32)
CREAT SERPL-MCNC: 1.1 MG/DL (ref 0.6–1.3)
EOSINOPHIL # BLD AUTO: 0.73 THOUSAND/ΜL (ref 0–0.61)
EOSINOPHIL NFR BLD AUTO: 9 % (ref 0–6)
ERYTHROCYTE [DISTWIDTH] IN BLOOD BY AUTOMATED COUNT: 12.9 % (ref 11.6–15.1)
ERYTHROCYTE [SEDIMENTATION RATE] IN BLOOD: 8 MM/HOUR (ref 0–10)
GFR SERPL CREATININE-BSD FRML MDRD: >60 ML/MIN/1.73SQ M
GLUCOSE SERPL-MCNC: 90 MG/DL (ref 65–140)
HCT VFR BLD AUTO: 42 % (ref 36.5–49.3)
HGB BLD-MCNC: 13.6 G/DL (ref 12–17)
LDH SERPL-CCNC: 186 U/L (ref 81–234)
LIPASE SERPL-CCNC: 224 U/L (ref 73–393)
LYMPHOCYTES # BLD AUTO: 1.57 THOUSANDS/ΜL (ref 0.6–4.47)
LYMPHOCYTES NFR BLD AUTO: 20 % (ref 14–44)
MAGNESIUM SERPL-MCNC: 1.9 MG/DL (ref 1.6–2.6)
MCH RBC QN AUTO: 28.2 PG (ref 26.8–34.3)
MCHC RBC AUTO-ENTMCNC: 32.4 G/DL (ref 31.4–37.4)
MCV RBC AUTO: 87 FL (ref 82–98)
MONOCYTES # BLD AUTO: 0.68 THOUSAND/ΜL (ref 0.17–1.22)
MONOCYTES NFR BLD AUTO: 9 % (ref 4–12)
NEUTROPHILS # BLD AUTO: 4.9 THOUSANDS/ΜL (ref 1.85–7.62)
NEUTS SEG NFR BLD AUTO: 61 % (ref 43–75)
PLATELET # BLD AUTO: 328 THOUSANDS/UL (ref 149–390)
PMV BLD AUTO: 10.3 FL (ref 8.9–12.7)
POTASSIUM SERPL-SCNC: 4 MMOL/L (ref 3.5–5.3)
PROT SERPL-MCNC: 7 G/DL (ref 6.4–8.2)
RBC # BLD AUTO: 4.83 MILLION/UL (ref 3.88–5.62)
SODIUM SERPL-SCNC: 142 MMOL/L (ref 136–145)
T3FREE SERPL-MCNC: 1.92 PG/ML (ref 2.3–4.2)
T4 FREE SERPL-MCNC: 0.77 NG/DL (ref 0.76–1.46)
T4 FREE SERPL-MCNC: 0.82 NG/DL (ref 0.76–1.46)
TSH SERPL DL<=0.05 MIU/L-ACNC: 6.14 UIU/ML (ref 0.36–3.74)
WBC # BLD AUTO: 7.93 THOUSAND/UL (ref 4.31–10.16)

## 2017-07-05 PROCEDURE — 85025 COMPLETE CBC W/AUTO DIFF WBC: CPT | Performed by: SPECIALIST

## 2017-07-05 PROCEDURE — 85652 RBC SED RATE AUTOMATED: CPT | Performed by: SPECIALIST

## 2017-07-05 PROCEDURE — 80053 COMPREHEN METABOLIC PANEL: CPT | Performed by: SPECIALIST

## 2017-07-05 PROCEDURE — 83735 ASSAY OF MAGNESIUM: CPT | Performed by: SPECIALIST

## 2017-07-05 PROCEDURE — 84443 ASSAY THYROID STIM HORMONE: CPT | Performed by: SPECIALIST

## 2017-07-05 PROCEDURE — 84481 FREE ASSAY (FT-3): CPT | Performed by: SPECIALIST

## 2017-07-05 PROCEDURE — 84439 ASSAY OF FREE THYROXINE: CPT | Performed by: SPECIALIST

## 2017-07-05 PROCEDURE — 82150 ASSAY OF AMYLASE: CPT | Performed by: SPECIALIST

## 2017-07-05 PROCEDURE — 83615 LACTATE (LD) (LDH) ENZYME: CPT | Performed by: SPECIALIST

## 2017-07-05 PROCEDURE — 83690 ASSAY OF LIPASE: CPT | Performed by: SPECIALIST

## 2017-07-05 PROCEDURE — 96413 CHEMO IV INFUSION 1 HR: CPT

## 2017-07-05 RX ORDER — SODIUM CHLORIDE 9 MG/ML
20 INJECTION, SOLUTION INTRAVENOUS CONTINUOUS
Status: DISCONTINUED | OUTPATIENT
Start: 2017-07-05 | End: 2017-07-08 | Stop reason: HOSPADM

## 2017-07-05 RX ADMIN — SODIUM CHLORIDE 20 ML/HR: 0.9 INJECTION, SOLUTION INTRAVENOUS at 11:10

## 2017-07-05 RX ADMIN — Medication 300 UNITS: at 08:57

## 2017-07-05 RX ADMIN — Medication 300 UNITS: at 12:37

## 2017-07-05 RX ADMIN — Medication 332 MG: at 11:36

## 2017-07-05 NOTE — PROGRESS NOTES
Pt resting with no complaints  Vitals stable; labs within parameters for treatment  Tushar Diallo notified that Pt TSH is 6 142  OK to proceed  Callbell within reach; will continue to monitor

## 2017-07-17 ENCOUNTER — ALLSCRIPTS OFFICE VISIT (OUTPATIENT)
Dept: OTHER | Facility: OTHER | Age: 57
End: 2017-07-17

## 2017-07-17 ENCOUNTER — HOSPITAL ENCOUNTER (OUTPATIENT)
Dept: INFUSION CENTER | Facility: CLINIC | Age: 57
Discharge: HOME | End: 2017-07-17
Payer: COMMERCIAL

## 2017-07-17 VITALS
SYSTOLIC BLOOD PRESSURE: 154 MMHG | DIASTOLIC BLOOD PRESSURE: 86 MMHG | BODY MASS INDEX: 32.52 KG/M2 | TEMPERATURE: 98 F | OXYGEN SATURATION: 98 % | WEIGHT: 246.5 LBS | HEART RATE: 82 BPM | RESPIRATION RATE: 20 BRPM

## 2017-07-17 LAB
ALBUMIN SERPL BCP-MCNC: 3.5 G/DL (ref 3.5–5)
ALP SERPL-CCNC: 75 U/L (ref 46–116)
ALT SERPL W P-5'-P-CCNC: 28 U/L (ref 12–78)
AMYLASE SERPL-CCNC: 28 IU/L (ref 25–115)
ANION GAP SERPL CALCULATED.3IONS-SCNC: 8 MMOL/L (ref 4–13)
AST SERPL W P-5'-P-CCNC: 16 U/L (ref 5–45)
BASOPHILS # BLD AUTO: 0.04 THOUSANDS/ΜL (ref 0–0.1)
BASOPHILS NFR BLD AUTO: 1 % (ref 0–1)
BILIRUB SERPL-MCNC: 0.2 MG/DL (ref 0.2–1)
BUN SERPL-MCNC: 13 MG/DL (ref 5–25)
CALCIUM SERPL-MCNC: 8.9 MG/DL (ref 8.3–10.1)
CHLORIDE SERPL-SCNC: 106 MMOL/L (ref 100–108)
CO2 SERPL-SCNC: 28 MMOL/L (ref 21–32)
CREAT SERPL-MCNC: 1.21 MG/DL (ref 0.6–1.3)
EOSINOPHIL # BLD AUTO: 0.9 THOUSAND/ΜL (ref 0–0.61)
EOSINOPHIL NFR BLD AUTO: 14 % (ref 0–6)
ERYTHROCYTE [DISTWIDTH] IN BLOOD BY AUTOMATED COUNT: 13.2 % (ref 11.6–15.1)
ERYTHROCYTE [SEDIMENTATION RATE] IN BLOOD: 4 MM/HOUR (ref 0–10)
GFR SERPL CREATININE-BSD FRML MDRD: >60 ML/MIN/1.73SQ M
GLUCOSE SERPL-MCNC: 106 MG/DL (ref 65–140)
HCT VFR BLD AUTO: 43.3 % (ref 36.5–49.3)
HGB BLD-MCNC: 14 G/DL (ref 12–17)
LDH SERPL-CCNC: 169 U/L (ref 81–234)
LIPASE SERPL-CCNC: 204 U/L (ref 73–393)
LYMPHOCYTES # BLD AUTO: 1.37 THOUSANDS/ΜL (ref 0.6–4.47)
LYMPHOCYTES NFR BLD AUTO: 21 % (ref 14–44)
MAGNESIUM SERPL-MCNC: 1.8 MG/DL (ref 1.6–2.6)
MCH RBC QN AUTO: 28.1 PG (ref 26.8–34.3)
MCHC RBC AUTO-ENTMCNC: 32.3 G/DL (ref 31.4–37.4)
MCV RBC AUTO: 87 FL (ref 82–98)
MONOCYTES # BLD AUTO: 0.64 THOUSAND/ΜL (ref 0.17–1.22)
MONOCYTES NFR BLD AUTO: 10 % (ref 4–12)
NEUTROPHILS # BLD AUTO: 3.6 THOUSANDS/ΜL (ref 1.85–7.62)
NEUTS SEG NFR BLD AUTO: 54 % (ref 43–75)
PLATELET # BLD AUTO: 300 THOUSANDS/UL (ref 149–390)
PMV BLD AUTO: 10.3 FL (ref 8.9–12.7)
POTASSIUM SERPL-SCNC: 4.1 MMOL/L (ref 3.5–5.3)
PROT SERPL-MCNC: 6.7 G/DL (ref 6.4–8.2)
RBC # BLD AUTO: 4.99 MILLION/UL (ref 3.88–5.62)
SODIUM SERPL-SCNC: 142 MMOL/L (ref 136–145)
T3FREE SERPL-MCNC: 2.4 PG/ML (ref 2.3–4.2)
T4 FREE SERPL-MCNC: 0.98 NG/DL (ref 0.76–1.46)
TSH SERPL DL<=0.05 MIU/L-ACNC: 3.17 UIU/ML (ref 0.36–3.74)
WBC # BLD AUTO: 6.55 THOUSAND/UL (ref 4.31–10.16)

## 2017-07-17 PROCEDURE — 84443 ASSAY THYROID STIM HORMONE: CPT | Performed by: PHYSICIAN ASSISTANT

## 2017-07-17 PROCEDURE — 82150 ASSAY OF AMYLASE: CPT | Performed by: PHYSICIAN ASSISTANT

## 2017-07-17 PROCEDURE — 85652 RBC SED RATE AUTOMATED: CPT | Performed by: PHYSICIAN ASSISTANT

## 2017-07-17 PROCEDURE — 84481 FREE ASSAY (FT-3): CPT | Performed by: PHYSICIAN ASSISTANT

## 2017-07-17 PROCEDURE — 85025 COMPLETE CBC W/AUTO DIFF WBC: CPT | Performed by: PHYSICIAN ASSISTANT

## 2017-07-17 PROCEDURE — 83690 ASSAY OF LIPASE: CPT | Performed by: PHYSICIAN ASSISTANT

## 2017-07-17 PROCEDURE — 80053 COMPREHEN METABOLIC PANEL: CPT | Performed by: PHYSICIAN ASSISTANT

## 2017-07-17 PROCEDURE — 83615 LACTATE (LD) (LDH) ENZYME: CPT | Performed by: PHYSICIAN ASSISTANT

## 2017-07-17 PROCEDURE — 83735 ASSAY OF MAGNESIUM: CPT | Performed by: PHYSICIAN ASSISTANT

## 2017-07-17 PROCEDURE — 84439 ASSAY OF FREE THYROXINE: CPT | Performed by: PHYSICIAN ASSISTANT

## 2017-07-17 PROCEDURE — 96413 CHEMO IV INFUSION 1 HR: CPT

## 2017-07-17 RX ORDER — SODIUM CHLORIDE 9 MG/ML
20 INJECTION, SOLUTION INTRAVENOUS CONTINUOUS
Status: DISCONTINUED | OUTPATIENT
Start: 2017-07-17 | End: 2017-07-20 | Stop reason: HOSPADM

## 2017-07-17 RX ADMIN — SODIUM CHLORIDE 20 ML/HR: 0.9 INJECTION, SOLUTION INTRAVENOUS at 11:45

## 2017-07-17 RX ADMIN — Medication 337 MG: at 12:45

## 2017-07-17 RX ADMIN — Medication 300 UNITS: at 13:58

## 2017-07-17 RX ADMIN — Medication 300 UNITS: at 09:10

## 2017-07-17 NOTE — PROGRESS NOTES
Patient arrived for central line bloodwork  Patient offers no complaints  Blood work drawn with [facility] [place] [facility] flushed per protocol  Patient left accessed for treatment later today after his appointment at Dr Galo Baker office  Patient aware of appointment time   Declined AVS

## 2017-07-31 ENCOUNTER — ALLSCRIPTS OFFICE VISIT (OUTPATIENT)
Dept: OTHER | Facility: OTHER | Age: 57
End: 2017-07-31

## 2017-07-31 ENCOUNTER — HOSPITAL ENCOUNTER (OUTPATIENT)
Dept: INFUSION CENTER | Facility: CLINIC | Age: 57
Discharge: HOME | End: 2017-07-31
Payer: COMMERCIAL

## 2017-07-31 VITALS
HEART RATE: 84 BPM | DIASTOLIC BLOOD PRESSURE: 80 MMHG | SYSTOLIC BLOOD PRESSURE: 142 MMHG | TEMPERATURE: 98.6 F | RESPIRATION RATE: 18 BRPM

## 2017-07-31 PROCEDURE — 96413 CHEMO IV INFUSION 1 HR: CPT

## 2017-07-31 RX ORDER — SODIUM CHLORIDE 9 MG/ML
20 INJECTION, SOLUTION INTRAVENOUS CONTINUOUS
Status: DISCONTINUED | OUTPATIENT
Start: 2017-07-31 | End: 2017-08-03 | Stop reason: HOSPADM

## 2017-07-31 RX ADMIN — Medication 300 UNITS: at 12:45

## 2017-07-31 RX ADMIN — Medication 337 MG: at 11:39

## 2017-07-31 RX ADMIN — SODIUM CHLORIDE 20 ML/HR: 0.9 INJECTION, SOLUTION INTRAVENOUS at 10:54

## 2017-07-31 NOTE — PROGRESS NOTES
Pt resting with no complaints  Vitals stable; no labs needed prior to treatment  Callbell within reach; will continue to monitor

## 2017-08-14 ENCOUNTER — HOSPITAL ENCOUNTER (OUTPATIENT)
Dept: INFUSION CENTER | Facility: CLINIC | Age: 57
Discharge: HOME | End: 2017-08-14
Payer: COMMERCIAL

## 2017-08-14 ENCOUNTER — ALLSCRIPTS OFFICE VISIT (OUTPATIENT)
Dept: OTHER | Facility: OTHER | Age: 57
End: 2017-08-14

## 2017-08-14 VITALS — WEIGHT: 247 LBS | BODY MASS INDEX: 32.59 KG/M2

## 2017-08-14 DIAGNOSIS — C43.9 MALIGNANT MELANOMA OF SKIN (HCC): ICD-10-CM

## 2017-08-14 LAB
ALBUMIN SERPL BCP-MCNC: 3.4 G/DL (ref 3.5–5)
ALP SERPL-CCNC: 70 U/L (ref 46–116)
ALT SERPL W P-5'-P-CCNC: 24 U/L (ref 12–78)
AMYLASE SERPL-CCNC: 25 IU/L (ref 25–115)
ANION GAP SERPL CALCULATED.3IONS-SCNC: 8 MMOL/L (ref 4–13)
AST SERPL W P-5'-P-CCNC: 19 U/L (ref 5–45)
BASOPHILS # BLD AUTO: 0.05 THOUSANDS/ΜL (ref 0–0.1)
BASOPHILS NFR BLD AUTO: 1 % (ref 0–1)
BILIRUB SERPL-MCNC: 0.3 MG/DL (ref 0.2–1)
BUN SERPL-MCNC: 15 MG/DL (ref 5–25)
CALCIUM SERPL-MCNC: 8.6 MG/DL (ref 8.3–10.1)
CHLORIDE SERPL-SCNC: 107 MMOL/L (ref 100–108)
CO2 SERPL-SCNC: 27 MMOL/L (ref 21–32)
CREAT SERPL-MCNC: 1.14 MG/DL (ref 0.6–1.3)
EOSINOPHIL # BLD AUTO: 0.84 THOUSAND/ΜL (ref 0–0.61)
EOSINOPHIL NFR BLD AUTO: 12 % (ref 0–6)
ERYTHROCYTE [DISTWIDTH] IN BLOOD BY AUTOMATED COUNT: 14 % (ref 11.6–15.1)
ERYTHROCYTE [SEDIMENTATION RATE] IN BLOOD: 5 MM/HOUR (ref 0–10)
GFR SERPL CREATININE-BSD FRML MDRD: 70 ML/MIN/1.73SQ M
GLUCOSE SERPL-MCNC: 128 MG/DL (ref 65–140)
HCT VFR BLD AUTO: 42 % (ref 36.5–49.3)
HGB BLD-MCNC: 13.7 G/DL (ref 12–17)
LDH SERPL-CCNC: 187 U/L (ref 81–234)
LIPASE SERPL-CCNC: 182 U/L (ref 73–393)
LYMPHOCYTES # BLD AUTO: 1.56 THOUSANDS/ΜL (ref 0.6–4.47)
LYMPHOCYTES NFR BLD AUTO: 22 % (ref 14–44)
MAGNESIUM SERPL-MCNC: 1.8 MG/DL (ref 1.6–2.6)
MCH RBC QN AUTO: 28 PG (ref 26.8–34.3)
MCHC RBC AUTO-ENTMCNC: 32.6 G/DL (ref 31.4–37.4)
MCV RBC AUTO: 86 FL (ref 82–98)
MONOCYTES # BLD AUTO: 0.61 THOUSAND/ΜL (ref 0.17–1.22)
MONOCYTES NFR BLD AUTO: 9 % (ref 4–12)
NEUTROPHILS # BLD AUTO: 3.97 THOUSANDS/ΜL (ref 1.85–7.62)
NEUTS SEG NFR BLD AUTO: 56 % (ref 43–75)
PLATELET # BLD AUTO: 295 THOUSANDS/UL (ref 149–390)
PMV BLD AUTO: 10.3 FL (ref 8.9–12.7)
POTASSIUM SERPL-SCNC: 3.7 MMOL/L (ref 3.5–5.3)
PROT SERPL-MCNC: 6.7 G/DL (ref 6.4–8.2)
RBC # BLD AUTO: 4.9 MILLION/UL (ref 3.88–5.62)
SODIUM SERPL-SCNC: 142 MMOL/L (ref 136–145)
T3FREE SERPL-MCNC: 2.58 PG/ML (ref 2.3–4.2)
T4 FREE SERPL-MCNC: 0.95 NG/DL (ref 0.76–1.46)
TSH SERPL DL<=0.05 MIU/L-ACNC: 0.68 UIU/ML (ref 0.36–3.74)
WBC # BLD AUTO: 7.03 THOUSAND/UL (ref 4.31–10.16)

## 2017-08-14 PROCEDURE — 84481 FREE ASSAY (FT-3): CPT | Performed by: PHYSICIAN ASSISTANT

## 2017-08-14 PROCEDURE — 82150 ASSAY OF AMYLASE: CPT | Performed by: PHYSICIAN ASSISTANT

## 2017-08-14 PROCEDURE — 83690 ASSAY OF LIPASE: CPT | Performed by: PHYSICIAN ASSISTANT

## 2017-08-14 PROCEDURE — 83735 ASSAY OF MAGNESIUM: CPT | Performed by: PHYSICIAN ASSISTANT

## 2017-08-14 PROCEDURE — 85025 COMPLETE CBC W/AUTO DIFF WBC: CPT | Performed by: PHYSICIAN ASSISTANT

## 2017-08-14 PROCEDURE — 85652 RBC SED RATE AUTOMATED: CPT | Performed by: PHYSICIAN ASSISTANT

## 2017-08-14 PROCEDURE — 80053 COMPREHEN METABOLIC PANEL: CPT | Performed by: PHYSICIAN ASSISTANT

## 2017-08-14 PROCEDURE — 83615 LACTATE (LD) (LDH) ENZYME: CPT | Performed by: PHYSICIAN ASSISTANT

## 2017-08-14 PROCEDURE — 96413 CHEMO IV INFUSION 1 HR: CPT

## 2017-08-14 PROCEDURE — 84443 ASSAY THYROID STIM HORMONE: CPT | Performed by: PHYSICIAN ASSISTANT

## 2017-08-14 PROCEDURE — 84439 ASSAY OF FREE THYROXINE: CPT | Performed by: PHYSICIAN ASSISTANT

## 2017-08-14 RX ORDER — SODIUM CHLORIDE 9 MG/ML
20 INJECTION, SOLUTION INTRAVENOUS CONTINUOUS
Status: DISCONTINUED | OUTPATIENT
Start: 2017-08-14 | End: 2017-08-17 | Stop reason: HOSPADM

## 2017-08-14 RX ADMIN — Medication 337 MG: at 12:23

## 2017-08-14 RX ADMIN — Medication 300 UNITS: at 13:40

## 2017-08-14 RX ADMIN — SODIUM CHLORIDE 20 ML/HR: 0.9 INJECTION, SOLUTION INTRAVENOUS at 11:38

## 2017-08-14 RX ADMIN — Medication 300 UNITS: at 09:40

## 2017-08-14 NOTE — PROGRESS NOTES
Labs obtained via port, good blood return noted, flushed per protocol   Returning for tx after doctor's appt

## 2017-08-25 ENCOUNTER — TRANSCRIBE ORDERS (OUTPATIENT)
Dept: ADMINISTRATIVE | Facility: HOSPITAL | Age: 57
End: 2017-08-25

## 2017-08-25 DIAGNOSIS — C78.00 MALIGNANT NEOPLASM METASTATIC TO LUNG, UNSPECIFIED LATERALITY (HCC): Primary | ICD-10-CM

## 2017-08-25 DIAGNOSIS — C79.49 SECONDARY MALIGNANT NEOPLASM OF BRAIN AND SPINAL CORD (HCC): Primary | ICD-10-CM

## 2017-08-25 DIAGNOSIS — C79.31 SECONDARY MALIGNANT NEOPLASM OF BRAIN AND SPINAL CORD (HCC): Primary | ICD-10-CM

## 2017-08-28 ENCOUNTER — HOSPITAL ENCOUNTER (OUTPATIENT)
Dept: INFUSION CENTER | Facility: CLINIC | Age: 57
Discharge: HOME | End: 2017-08-28
Payer: COMMERCIAL

## 2017-08-28 ENCOUNTER — ALLSCRIPTS OFFICE VISIT (OUTPATIENT)
Dept: OTHER | Facility: OTHER | Age: 57
End: 2017-08-28

## 2017-08-28 VITALS — WEIGHT: 246 LBS | BODY MASS INDEX: 32.46 KG/M2

## 2017-08-28 LAB
ALBUMIN SERPL BCP-MCNC: 3.2 G/DL (ref 3.5–5)
ALP SERPL-CCNC: 71 U/L (ref 46–116)
ALT SERPL W P-5'-P-CCNC: 27 U/L (ref 12–78)
AMYLASE SERPL-CCNC: 21 IU/L (ref 25–115)
ANION GAP SERPL CALCULATED.3IONS-SCNC: 9 MMOL/L (ref 4–13)
AST SERPL W P-5'-P-CCNC: 18 U/L (ref 5–45)
BASOPHILS # BLD AUTO: 0.04 THOUSANDS/ΜL (ref 0–0.1)
BASOPHILS NFR BLD AUTO: 1 % (ref 0–1)
BILIRUB SERPL-MCNC: 0.4 MG/DL (ref 0.2–1)
BUN SERPL-MCNC: 14 MG/DL (ref 5–25)
CALCIUM SERPL-MCNC: 8.5 MG/DL (ref 8.3–10.1)
CHLORIDE SERPL-SCNC: 106 MMOL/L (ref 100–108)
CO2 SERPL-SCNC: 25 MMOL/L (ref 21–32)
CREAT SERPL-MCNC: 1.3 MG/DL (ref 0.6–1.3)
EOSINOPHIL # BLD AUTO: 0.75 THOUSAND/ΜL (ref 0–0.61)
EOSINOPHIL NFR BLD AUTO: 9 % (ref 0–6)
ERYTHROCYTE [DISTWIDTH] IN BLOOD BY AUTOMATED COUNT: 14.3 % (ref 11.6–15.1)
ERYTHROCYTE [SEDIMENTATION RATE] IN BLOOD: 4 MM/HOUR (ref 0–10)
GFR SERPL CREATININE-BSD FRML MDRD: 60 ML/MIN/1.73SQ M
GLUCOSE SERPL-MCNC: 172 MG/DL (ref 65–140)
HCT VFR BLD AUTO: 41.9 % (ref 36.5–49.3)
HGB BLD-MCNC: 13.8 G/DL (ref 12–17)
LDH SERPL-CCNC: 171 U/L (ref 81–234)
LIPASE SERPL-CCNC: 148 U/L (ref 73–393)
LYMPHOCYTES # BLD AUTO: 1.63 THOUSANDS/ΜL (ref 0.6–4.47)
LYMPHOCYTES NFR BLD AUTO: 20 % (ref 14–44)
MAGNESIUM SERPL-MCNC: 1.9 MG/DL (ref 1.6–2.6)
MCH RBC QN AUTO: 27.8 PG (ref 26.8–34.3)
MCHC RBC AUTO-ENTMCNC: 32.9 G/DL (ref 31.4–37.4)
MCV RBC AUTO: 85 FL (ref 82–98)
MONOCYTES # BLD AUTO: 0.54 THOUSAND/ΜL (ref 0.17–1.22)
MONOCYTES NFR BLD AUTO: 7 % (ref 4–12)
NEUTROPHILS # BLD AUTO: 5.04 THOUSANDS/ΜL (ref 1.85–7.62)
NEUTS SEG NFR BLD AUTO: 63 % (ref 43–75)
PLATELET # BLD AUTO: 336 THOUSANDS/UL (ref 149–390)
PMV BLD AUTO: 10.2 FL (ref 8.9–12.7)
POTASSIUM SERPL-SCNC: 3.7 MMOL/L (ref 3.5–5.3)
PROT SERPL-MCNC: 6.4 G/DL (ref 6.4–8.2)
RBC # BLD AUTO: 4.96 MILLION/UL (ref 3.88–5.62)
SODIUM SERPL-SCNC: 140 MMOL/L (ref 136–145)
T3FREE SERPL-MCNC: 2.33 PG/ML (ref 2.3–4.2)
T4 FREE SERPL-MCNC: 1 NG/DL (ref 0.76–1.46)
TSH SERPL DL<=0.05 MIU/L-ACNC: 2.35 UIU/ML (ref 0.36–3.74)
WBC # BLD AUTO: 8 THOUSAND/UL (ref 4.31–10.16)

## 2017-08-28 PROCEDURE — 83615 LACTATE (LD) (LDH) ENZYME: CPT | Performed by: SPECIALIST

## 2017-08-28 PROCEDURE — 84481 FREE ASSAY (FT-3): CPT | Performed by: SPECIALIST

## 2017-08-28 PROCEDURE — 80053 COMPREHEN METABOLIC PANEL: CPT | Performed by: SPECIALIST

## 2017-08-28 PROCEDURE — 84439 ASSAY OF FREE THYROXINE: CPT | Performed by: SPECIALIST

## 2017-08-28 PROCEDURE — 83735 ASSAY OF MAGNESIUM: CPT | Performed by: SPECIALIST

## 2017-08-28 PROCEDURE — 84443 ASSAY THYROID STIM HORMONE: CPT | Performed by: SPECIALIST

## 2017-08-28 PROCEDURE — 83690 ASSAY OF LIPASE: CPT | Performed by: SPECIALIST

## 2017-08-28 PROCEDURE — 96413 CHEMO IV INFUSION 1 HR: CPT

## 2017-08-28 PROCEDURE — 85652 RBC SED RATE AUTOMATED: CPT | Performed by: SPECIALIST

## 2017-08-28 PROCEDURE — 82150 ASSAY OF AMYLASE: CPT | Performed by: SPECIALIST

## 2017-08-28 PROCEDURE — 85025 COMPLETE CBC W/AUTO DIFF WBC: CPT | Performed by: SPECIALIST

## 2017-08-28 RX ORDER — SODIUM CHLORIDE 9 MG/ML
20 INJECTION, SOLUTION INTRAVENOUS CONTINUOUS
Status: DISCONTINUED | OUTPATIENT
Start: 2017-08-28 | End: 2017-08-31 | Stop reason: HOSPADM

## 2017-08-28 RX ADMIN — SODIUM CHLORIDE 20 ML/HR: 0.9 INJECTION, SOLUTION INTRAVENOUS at 11:30

## 2017-08-28 RX ADMIN — Medication 337 MG: at 12:27

## 2017-08-28 RX ADMIN — Medication 300 UNITS: at 13:35

## 2017-08-28 RX ADMIN — Medication 300 UNITS: at 09:57

## 2017-08-28 NOTE — PROGRESS NOTES
Patient tolerated treatment today without complications  Patient aware of upcoming appointments   Declined AVS

## 2017-08-28 NOTE — PROGRESS NOTES
Pt is here for labs from his port  He offers no complaints at this time  Labs drawn per dr's orders  Pt has and appointment with Dr Giorgi Hidalgo and will be back for treatment so port remained accessed  Next appointment confirmed   Pt declined avs

## 2017-09-12 ENCOUNTER — HOSPITAL ENCOUNTER (OUTPATIENT)
Dept: INFUSION CENTER | Facility: CLINIC | Age: 57
Discharge: HOME | End: 2017-09-12
Payer: COMMERCIAL

## 2017-09-12 ENCOUNTER — ALLSCRIPTS OFFICE VISIT (OUTPATIENT)
Dept: OTHER | Facility: OTHER | Age: 57
End: 2017-09-12

## 2017-09-12 VITALS
TEMPERATURE: 98 F | DIASTOLIC BLOOD PRESSURE: 78 MMHG | BODY MASS INDEX: 31.7 KG/M2 | SYSTOLIC BLOOD PRESSURE: 148 MMHG | HEIGHT: 74 IN | RESPIRATION RATE: 18 BRPM | HEART RATE: 70 BPM | WEIGHT: 247 LBS

## 2017-09-12 PROCEDURE — 96413 CHEMO IV INFUSION 1 HR: CPT

## 2017-09-12 RX ORDER — SODIUM CHLORIDE 9 MG/ML
20 INJECTION, SOLUTION INTRAVENOUS CONTINUOUS
Status: DISCONTINUED | OUTPATIENT
Start: 2017-09-12 | End: 2017-09-15 | Stop reason: HOSPADM

## 2017-09-12 RX ORDER — FLUTICASONE PROPIONATE 50 MCG
1 SPRAY, SUSPENSION (ML) NASAL DAILY
COMMUNITY
End: 2018-07-25

## 2017-09-12 RX ADMIN — Medication 336 MG: at 16:04

## 2017-09-12 RX ADMIN — SODIUM CHLORIDE 20 ML/HR: 0.9 INJECTION, SOLUTION INTRAVENOUS at 16:03

## 2017-09-12 RX ADMIN — Medication 300 UNITS: at 17:14

## 2017-09-20 ENCOUNTER — HOSPITAL ENCOUNTER (OUTPATIENT)
Dept: CT IMAGING | Facility: HOSPITAL | Age: 57
Discharge: HOME | End: 2017-09-20

## 2017-09-20 ENCOUNTER — HOSPITAL ENCOUNTER (OUTPATIENT)
Dept: MRI IMAGING | Facility: HOSPITAL | Age: 57
Discharge: HOME | End: 2017-09-20

## 2017-09-20 DIAGNOSIS — C43.9 MALIGNANT MELANOMA OF SKIN (HCC): ICD-10-CM

## 2017-09-20 DIAGNOSIS — C78.00 SECONDARY MALIGNANT NEOPLASM OF LUNG (HCC): ICD-10-CM

## 2017-09-20 DIAGNOSIS — C79.31 SECONDARY MALIGNANT NEOPLASM OF BRAIN (HCC): ICD-10-CM

## 2017-09-20 PROCEDURE — 74176 CT ABD & PELVIS W/O CONTRAST: CPT

## 2017-09-20 PROCEDURE — 71250 CT THORAX DX C-: CPT

## 2017-09-20 PROCEDURE — 70553 MRI BRAIN STEM W/O & W/DYE: CPT

## 2017-09-20 PROCEDURE — A9585 GADOBUTROL INJECTION: HCPCS | Performed by: PHYSICIAN ASSISTANT

## 2017-09-20 RX ADMIN — GADOBUTROL 12 ML: 604.72 INJECTION INTRAVENOUS at 11:21

## 2017-09-25 ENCOUNTER — GENERIC CONVERSION - ENCOUNTER (OUTPATIENT)
Dept: OTHER | Facility: OTHER | Age: 57
End: 2017-09-25

## 2017-09-25 ENCOUNTER — HOSPITAL ENCOUNTER (OUTPATIENT)
Dept: INFUSION CENTER | Facility: CLINIC | Age: 57
Discharge: HOME | End: 2017-09-25
Payer: COMMERCIAL

## 2017-09-25 VITALS
WEIGHT: 246 LBS | RESPIRATION RATE: 16 BRPM | SYSTOLIC BLOOD PRESSURE: 135 MMHG | TEMPERATURE: 98.6 F | HEART RATE: 91 BPM | BODY MASS INDEX: 31.58 KG/M2 | OXYGEN SATURATION: 94 % | DIASTOLIC BLOOD PRESSURE: 71 MMHG

## 2017-09-25 LAB
ALBUMIN SERPL BCP-MCNC: 3.3 G/DL (ref 3.5–5)
ALP SERPL-CCNC: 81 U/L (ref 46–116)
ALT SERPL W P-5'-P-CCNC: 27 U/L (ref 12–78)
AMYLASE SERPL-CCNC: 26 IU/L (ref 25–115)
ANION GAP SERPL CALCULATED.3IONS-SCNC: 9 MMOL/L (ref 4–13)
AST SERPL W P-5'-P-CCNC: 17 U/L (ref 5–45)
BASOPHILS # BLD AUTO: 0.09 THOUSANDS/ΜL (ref 0–0.1)
BASOPHILS NFR BLD AUTO: 1 % (ref 0–1)
BILIRUB SERPL-MCNC: 0.3 MG/DL (ref 0.2–1)
BUN SERPL-MCNC: 14 MG/DL (ref 5–25)
CALCIUM SERPL-MCNC: 8.4 MG/DL (ref 8.3–10.1)
CHLORIDE SERPL-SCNC: 105 MMOL/L (ref 100–108)
CO2 SERPL-SCNC: 24 MMOL/L (ref 21–32)
CREAT SERPL-MCNC: 1.4 MG/DL (ref 0.6–1.3)
EOSINOPHIL # BLD AUTO: 0.82 THOUSAND/ΜL (ref 0–0.61)
EOSINOPHIL NFR BLD AUTO: 9 % (ref 0–6)
ERYTHROCYTE [DISTWIDTH] IN BLOOD BY AUTOMATED COUNT: 14.9 % (ref 11.6–15.1)
ERYTHROCYTE [SEDIMENTATION RATE] IN BLOOD: 5 MM/HOUR (ref 0–10)
GFR SERPL CREATININE-BSD FRML MDRD: 55 ML/MIN/1.73SQ M
GLUCOSE SERPL-MCNC: 140 MG/DL (ref 65–140)
HCT VFR BLD AUTO: 41.4 % (ref 36.5–49.3)
HGB BLD-MCNC: 13.4 G/DL (ref 12–17)
LDH SERPL-CCNC: 194 U/L (ref 81–234)
LIPASE SERPL-CCNC: 171 U/L (ref 73–393)
LYMPHOCYTES # BLD AUTO: 1.82 THOUSANDS/ΜL (ref 0.6–4.47)
LYMPHOCYTES NFR BLD AUTO: 20 % (ref 14–44)
MAGNESIUM SERPL-MCNC: 1.7 MG/DL (ref 1.6–2.6)
MCH RBC QN AUTO: 27.9 PG (ref 26.8–34.3)
MCHC RBC AUTO-ENTMCNC: 32.4 G/DL (ref 31.4–37.4)
MCV RBC AUTO: 86 FL (ref 82–98)
MONOCYTES # BLD AUTO: 0.68 THOUSAND/ΜL (ref 0.17–1.22)
MONOCYTES NFR BLD AUTO: 8 % (ref 4–12)
NEUTROPHILS # BLD AUTO: 5.56 THOUSANDS/ΜL (ref 1.85–7.62)
NEUTS SEG NFR BLD AUTO: 62 % (ref 43–75)
PLATELET # BLD AUTO: 324 THOUSANDS/UL (ref 149–390)
PMV BLD AUTO: 10.1 FL (ref 8.9–12.7)
POTASSIUM SERPL-SCNC: 3.7 MMOL/L (ref 3.5–5.3)
PROT SERPL-MCNC: 6.4 G/DL (ref 6.4–8.2)
RBC # BLD AUTO: 4.81 MILLION/UL (ref 3.88–5.62)
SODIUM SERPL-SCNC: 138 MMOL/L (ref 136–145)
T3FREE SERPL-MCNC: 2.46 PG/ML (ref 2.3–4.2)
T4 FREE SERPL-MCNC: 0.95 NG/DL (ref 0.76–1.46)
TSH SERPL DL<=0.05 MIU/L-ACNC: 1.92 UIU/ML (ref 0.36–3.74)
WBC # BLD AUTO: 8.97 THOUSAND/UL (ref 4.31–10.16)

## 2017-09-25 PROCEDURE — 80053 COMPREHEN METABOLIC PANEL: CPT | Performed by: SPECIALIST

## 2017-09-25 PROCEDURE — 96413 CHEMO IV INFUSION 1 HR: CPT

## 2017-09-25 PROCEDURE — 83615 LACTATE (LD) (LDH) ENZYME: CPT | Performed by: SPECIALIST

## 2017-09-25 PROCEDURE — 83690 ASSAY OF LIPASE: CPT | Performed by: SPECIALIST

## 2017-09-25 PROCEDURE — 85652 RBC SED RATE AUTOMATED: CPT | Performed by: SPECIALIST

## 2017-09-25 PROCEDURE — 83735 ASSAY OF MAGNESIUM: CPT | Performed by: SPECIALIST

## 2017-09-25 PROCEDURE — 85025 COMPLETE CBC W/AUTO DIFF WBC: CPT | Performed by: SPECIALIST

## 2017-09-25 PROCEDURE — 82150 ASSAY OF AMYLASE: CPT | Performed by: SPECIALIST

## 2017-09-25 PROCEDURE — 84439 ASSAY OF FREE THYROXINE: CPT | Performed by: SPECIALIST

## 2017-09-25 PROCEDURE — 84443 ASSAY THYROID STIM HORMONE: CPT | Performed by: SPECIALIST

## 2017-09-25 PROCEDURE — 84481 FREE ASSAY (FT-3): CPT | Performed by: SPECIALIST

## 2017-09-25 RX ORDER — SODIUM CHLORIDE 9 MG/ML
20 INJECTION, SOLUTION INTRAVENOUS CONTINUOUS
Status: DISCONTINUED | OUTPATIENT
Start: 2017-09-25 | End: 2017-09-28 | Stop reason: HOSPADM

## 2017-09-25 RX ADMIN — Medication 335 MG: at 12:03

## 2017-09-25 RX ADMIN — Medication 300 UNITS: at 14:15

## 2017-09-25 RX ADMIN — SODIUM CHLORIDE 20 ML/HR: 0.9 INJECTION, SOLUTION INTRAVENOUS at 11:20

## 2017-09-25 NOTE — PLAN OF CARE
Problem: Potential for Falls  Goal: Patient will remain free of falls  INTERVENTIONS:  - Assess patient frequently for physical needs  -  Identify cognitive and physical deficits and behaviors that affect risk of falls    -  [place] fall precautions as indicated by assessment   - Educate patient/family on patient safety including physical limitations  - Instruct patient to call for assistance with activity based on assessment  - Modify environment to reduce risk of injury  - Consider OT/PT consult to assist with strengthening/mobility   Outcome: Progressing

## 2017-09-25 NOTE — PROGRESS NOTES
Patient here for labs in prep for treatment and f/u downstairs  He tolerated lab draw well and discharged  He will RTO if cleared for treatment 1030  He offers no c/o, has been doing well

## 2017-10-10 ENCOUNTER — HOSPITAL ENCOUNTER (OUTPATIENT)
Dept: INFUSION CENTER | Facility: CLINIC | Age: 57
Discharge: HOME | End: 2017-10-10
Payer: COMMERCIAL

## 2017-10-10 ENCOUNTER — GENERIC CONVERSION - ENCOUNTER (OUTPATIENT)
Dept: OTHER | Facility: OTHER | Age: 57
End: 2017-10-10

## 2017-10-10 VITALS
TEMPERATURE: 98 F | RESPIRATION RATE: 18 BRPM | HEART RATE: 98 BPM | HEIGHT: 74 IN | WEIGHT: 243.5 LBS | DIASTOLIC BLOOD PRESSURE: 84 MMHG | BODY MASS INDEX: 31.25 KG/M2 | SYSTOLIC BLOOD PRESSURE: 122 MMHG | OXYGEN SATURATION: 99 %

## 2017-10-10 LAB
ALBUMIN SERPL BCP-MCNC: 3.4 G/DL (ref 3.5–5)
ALP SERPL-CCNC: 78 U/L (ref 46–116)
ALT SERPL W P-5'-P-CCNC: 31 U/L (ref 12–78)
AMYLASE SERPL-CCNC: 24 IU/L (ref 25–115)
ANION GAP SERPL CALCULATED.3IONS-SCNC: 8 MMOL/L (ref 4–13)
AST SERPL W P-5'-P-CCNC: 20 U/L (ref 5–45)
BASOPHILS # BLD AUTO: 0.04 THOUSANDS/ΜL (ref 0–0.1)
BASOPHILS NFR BLD AUTO: 1 % (ref 0–1)
BILIRUB SERPL-MCNC: 0.5 MG/DL (ref 0.2–1)
BUN SERPL-MCNC: 14 MG/DL (ref 5–25)
CALCIUM SERPL-MCNC: 8.8 MG/DL (ref 8.3–10.1)
CHLORIDE SERPL-SCNC: 105 MMOL/L (ref 100–108)
CO2 SERPL-SCNC: 26 MMOL/L (ref 21–32)
CREAT SERPL-MCNC: 1.28 MG/DL (ref 0.6–1.3)
EOSINOPHIL # BLD AUTO: 0.78 THOUSAND/ΜL (ref 0–0.61)
EOSINOPHIL NFR BLD AUTO: 11 % (ref 0–6)
ERYTHROCYTE [DISTWIDTH] IN BLOOD BY AUTOMATED COUNT: 15.1 % (ref 11.6–15.1)
ERYTHROCYTE [SEDIMENTATION RATE] IN BLOOD: 9 MM/HOUR (ref 0–10)
GFR SERPL CREATININE-BSD FRML MDRD: 61 ML/MIN/1.73SQ M
GLUCOSE SERPL-MCNC: 122 MG/DL (ref 65–140)
HCT VFR BLD AUTO: 41.4 % (ref 36.5–49.3)
HGB BLD-MCNC: 13.3 G/DL (ref 12–17)
LDH SERPL-CCNC: 202 U/L (ref 81–234)
LIPASE SERPL-CCNC: 154 U/L (ref 73–393)
LYMPHOCYTES # BLD AUTO: 1.65 THOUSANDS/ΜL (ref 0.6–4.47)
LYMPHOCYTES NFR BLD AUTO: 23 % (ref 14–44)
MAGNESIUM SERPL-MCNC: 2.1 MG/DL (ref 1.6–2.6)
MCH RBC QN AUTO: 28.1 PG (ref 26.8–34.3)
MCHC RBC AUTO-ENTMCNC: 32.1 G/DL (ref 31.4–37.4)
MCV RBC AUTO: 88 FL (ref 82–98)
MONOCYTES # BLD AUTO: 0.67 THOUSAND/ΜL (ref 0.17–1.22)
MONOCYTES NFR BLD AUTO: 9 % (ref 4–12)
NEUTROPHILS # BLD AUTO: 4.04 THOUSANDS/ΜL (ref 1.85–7.62)
NEUTS SEG NFR BLD AUTO: 56 % (ref 43–75)
PLATELET # BLD AUTO: 336 THOUSANDS/UL (ref 149–390)
PMV BLD AUTO: 9.9 FL (ref 8.9–12.7)
POTASSIUM SERPL-SCNC: 3.8 MMOL/L (ref 3.5–5.3)
PROT SERPL-MCNC: 6.5 G/DL (ref 6.4–8.2)
RBC # BLD AUTO: 4.73 MILLION/UL (ref 3.88–5.62)
SODIUM SERPL-SCNC: 139 MMOL/L (ref 136–145)
T3FREE SERPL-MCNC: 2.05 PG/ML (ref 2.3–4.2)
T4 FREE SERPL-MCNC: 0.92 NG/DL (ref 0.76–1.46)
T4 FREE SERPL-MCNC: 0.93 NG/DL (ref 0.76–1.46)
TSH SERPL DL<=0.05 MIU/L-ACNC: 6.69 UIU/ML (ref 0.36–3.74)
WBC # BLD AUTO: 7.18 THOUSAND/UL (ref 4.31–10.16)

## 2017-10-10 PROCEDURE — J9299Q0 INV NIVOLUMAB BMS-936558 10 MG/ML 10 ML: Performed by: SPECIALIST

## 2017-10-10 PROCEDURE — 84443 ASSAY THYROID STIM HORMONE: CPT | Performed by: SPECIALIST

## 2017-10-10 PROCEDURE — 85025 COMPLETE CBC W/AUTO DIFF WBC: CPT | Performed by: SPECIALIST

## 2017-10-10 PROCEDURE — 80053 COMPREHEN METABOLIC PANEL: CPT | Performed by: SPECIALIST

## 2017-10-10 PROCEDURE — 83690 ASSAY OF LIPASE: CPT | Performed by: SPECIALIST

## 2017-10-10 PROCEDURE — 82150 ASSAY OF AMYLASE: CPT | Performed by: SPECIALIST

## 2017-10-10 PROCEDURE — 84439 ASSAY OF FREE THYROXINE: CPT | Performed by: SPECIALIST

## 2017-10-10 PROCEDURE — 96413 CHEMO IV INFUSION 1 HR: CPT

## 2017-10-10 PROCEDURE — 83735 ASSAY OF MAGNESIUM: CPT | Performed by: SPECIALIST

## 2017-10-10 PROCEDURE — 84481 FREE ASSAY (FT-3): CPT | Performed by: SPECIALIST

## 2017-10-10 PROCEDURE — 85652 RBC SED RATE AUTOMATED: CPT | Performed by: SPECIALIST

## 2017-10-10 PROCEDURE — 83615 LACTATE (LD) (LDH) ENZYME: CPT | Performed by: SPECIALIST

## 2017-10-10 RX ORDER — SODIUM CHLORIDE 9 MG/ML
20 INJECTION, SOLUTION INTRAVENOUS CONTINUOUS
Status: DISCONTINUED | OUTPATIENT
Start: 2017-10-10 | End: 2017-10-13 | Stop reason: HOSPADM

## 2017-10-10 RX ADMIN — SODIUM CHLORIDE 20 ML/HR: 0.9 INJECTION, SOLUTION INTRAVENOUS at 12:05

## 2017-10-10 RX ADMIN — Medication 300 UNITS: at 10:12

## 2017-10-10 RX ADMIN — Medication 332 MG: at 12:47

## 2017-10-10 NOTE — PLAN OF CARE
Problem: Potential for Falls  Goal: Patient will remain free of falls  INTERVENTIONS:  - Assess patient frequently for physical needs  -  Identify cognitive and physical deficits and behaviors that affect risk of falls    -  [place] fall precautions as indicated by assessment   - Educate patient/family on patient safety including physical limitations  - Instruct patient to call for assistance with activity based on assessment  - Modify environment to reduce risk of injury  - Consider OT/PT consult to assist with strengthening/mobility   Outcome: Progressing

## 2017-10-10 NOTE — PROGRESS NOTES
Pt has no c/o -- he feels well --- port accessed and labs drawna nd sent per orders    pt has a MD appt today  Pt request the Port be left accessed bc he will return for tx   dcd stable and ambulatory

## 2017-10-10 NOTE — PROGRESS NOTES
Pt here with labs resulted except complete thyroid studies - I did speak with Sara Rosa RN - clinical trials, and Md is aware of the elevation in TSH    pt is ok to start   Pt is aware of results and agreeable to proceed Sara Rosa RN did come and speak with pt chairside and Chloe JOINER okayed start of todays ordered treatment

## 2017-10-24 ENCOUNTER — GENERIC CONVERSION - ENCOUNTER (OUTPATIENT)
Dept: OTHER | Facility: OTHER | Age: 57
End: 2017-10-24

## 2017-10-24 ENCOUNTER — HOSPITAL ENCOUNTER (OUTPATIENT)
Dept: INFUSION CENTER | Facility: CLINIC | Age: 57
Discharge: HOME | End: 2017-10-24
Payer: COMMERCIAL

## 2017-10-24 VITALS
HEIGHT: 74 IN | TEMPERATURE: 98.8 F | BODY MASS INDEX: 31.7 KG/M2 | HEART RATE: 97 BPM | DIASTOLIC BLOOD PRESSURE: 74 MMHG | WEIGHT: 247 LBS | SYSTOLIC BLOOD PRESSURE: 118 MMHG | RESPIRATION RATE: 16 BRPM | OXYGEN SATURATION: 97 %

## 2017-10-24 PROCEDURE — 96413 CHEMO IV INFUSION 1 HR: CPT

## 2017-10-24 PROCEDURE — J9299Q0 INV NIVOLUMAB BMS-936558 10 MG/ML 10 ML: Performed by: SPECIALIST

## 2017-10-24 RX ORDER — SODIUM CHLORIDE 9 MG/ML
20 INJECTION, SOLUTION INTRAVENOUS CONTINUOUS
Status: DISCONTINUED | OUTPATIENT
Start: 2017-10-24 | End: 2017-10-27 | Stop reason: HOSPADM

## 2017-10-24 RX ADMIN — SODIUM CHLORIDE 20 ML/HR: 0.9 INJECTION, SOLUTION INTRAVENOUS at 11:16

## 2017-10-24 RX ADMIN — Medication 337 MG: at 11:57

## 2017-10-24 RX ADMIN — Medication 300 UNITS: at 12:59

## 2017-10-24 NOTE — PROGRESS NOTES
Pt to clinic for clinical trial for opdivo infusion, offers no complaints at this time, aware of next appointment, declined avs

## 2017-11-06 ENCOUNTER — HOSPITAL ENCOUNTER (OUTPATIENT)
Dept: INFUSION CENTER | Facility: CLINIC | Age: 57
Discharge: HOME | End: 2017-11-06
Payer: COMMERCIAL

## 2017-11-06 ENCOUNTER — GENERIC CONVERSION - ENCOUNTER (OUTPATIENT)
Dept: OTHER | Facility: OTHER | Age: 57
End: 2017-11-06

## 2017-11-06 VITALS
TEMPERATURE: 97.9 F | SYSTOLIC BLOOD PRESSURE: 122 MMHG | DIASTOLIC BLOOD PRESSURE: 76 MMHG | OXYGEN SATURATION: 94 % | BODY MASS INDEX: 31.65 KG/M2 | HEART RATE: 98 BPM | RESPIRATION RATE: 16 BRPM | WEIGHT: 246.5 LBS

## 2017-11-06 DIAGNOSIS — C43.9 MALIGNANT MELANOMA OF SKIN (HCC): ICD-10-CM

## 2017-11-06 LAB
ALBUMIN SERPL BCP-MCNC: 3.1 G/DL (ref 3.5–5)
ALP SERPL-CCNC: 74 U/L (ref 46–116)
ALT SERPL W P-5'-P-CCNC: 24 U/L (ref 12–78)
AMYLASE SERPL-CCNC: 25 IU/L (ref 25–115)
ANION GAP SERPL CALCULATED.3IONS-SCNC: 8 MMOL/L (ref 4–13)
AST SERPL W P-5'-P-CCNC: 21 U/L (ref 5–45)
BASOPHILS # BLD AUTO: 0.05 THOUSANDS/ΜL (ref 0–0.1)
BASOPHILS NFR BLD AUTO: 1 % (ref 0–1)
BILIRUB SERPL-MCNC: 0.3 MG/DL (ref 0.2–1)
BUN SERPL-MCNC: 15 MG/DL (ref 5–25)
CALCIUM SERPL-MCNC: 8.3 MG/DL (ref 8.3–10.1)
CHLORIDE SERPL-SCNC: 106 MMOL/L (ref 100–108)
CO2 SERPL-SCNC: 28 MMOL/L (ref 21–32)
CREAT SERPL-MCNC: 1.35 MG/DL (ref 0.6–1.3)
EOSINOPHIL # BLD AUTO: 0.79 THOUSAND/ΜL (ref 0–0.61)
EOSINOPHIL NFR BLD AUTO: 10 % (ref 0–6)
ERYTHROCYTE [DISTWIDTH] IN BLOOD BY AUTOMATED COUNT: 13.8 % (ref 11.6–15.1)
ERYTHROCYTE [SEDIMENTATION RATE] IN BLOOD: 5 MM/HOUR (ref 0–10)
GFR SERPL CREATININE-BSD FRML MDRD: 57 ML/MIN/1.73SQ M
GLUCOSE SERPL-MCNC: 123 MG/DL (ref 65–140)
HCT VFR BLD AUTO: 39.7 % (ref 36.5–49.3)
HGB BLD-MCNC: 12.7 G/DL (ref 12–17)
LDH SERPL-CCNC: 188 U/L (ref 81–234)
LIPASE SERPL-CCNC: 169 U/L (ref 73–393)
LYMPHOCYTES # BLD AUTO: 1.69 THOUSANDS/ΜL (ref 0.6–4.47)
LYMPHOCYTES NFR BLD AUTO: 21 % (ref 14–44)
MAGNESIUM SERPL-MCNC: 1.9 MG/DL (ref 1.6–2.6)
MCH RBC QN AUTO: 28 PG (ref 26.8–34.3)
MCHC RBC AUTO-ENTMCNC: 32 G/DL (ref 31.4–37.4)
MCV RBC AUTO: 87 FL (ref 82–98)
MONOCYTES # BLD AUTO: 0.67 THOUSAND/ΜL (ref 0.17–1.22)
MONOCYTES NFR BLD AUTO: 8 % (ref 4–12)
NEUTROPHILS # BLD AUTO: 4.94 THOUSANDS/ΜL (ref 1.85–7.62)
NEUTS SEG NFR BLD AUTO: 60 % (ref 43–75)
PLATELET # BLD AUTO: 368 THOUSANDS/UL (ref 149–390)
PMV BLD AUTO: 9.8 FL (ref 8.9–12.7)
POTASSIUM SERPL-SCNC: 4.1 MMOL/L (ref 3.5–5.3)
PROT SERPL-MCNC: 6 G/DL (ref 6.4–8.2)
RBC # BLD AUTO: 4.54 MILLION/UL (ref 3.88–5.62)
SODIUM SERPL-SCNC: 142 MMOL/L (ref 136–145)
T3FREE SERPL-MCNC: 2.35 PG/ML (ref 2.3–4.2)
T4 FREE SERPL-MCNC: 0.96 NG/DL (ref 0.76–1.46)
TSH SERPL DL<=0.05 MIU/L-ACNC: 2.42 UIU/ML (ref 0.36–3.74)
WBC # BLD AUTO: 8.14 THOUSAND/UL (ref 4.31–10.16)

## 2017-11-06 PROCEDURE — 80053 COMPREHEN METABOLIC PANEL: CPT | Performed by: PHYSICIAN ASSISTANT

## 2017-11-06 PROCEDURE — 84443 ASSAY THYROID STIM HORMONE: CPT | Performed by: PHYSICIAN ASSISTANT

## 2017-11-06 PROCEDURE — 84439 ASSAY OF FREE THYROXINE: CPT | Performed by: PHYSICIAN ASSISTANT

## 2017-11-06 PROCEDURE — 82150 ASSAY OF AMYLASE: CPT | Performed by: PHYSICIAN ASSISTANT

## 2017-11-06 PROCEDURE — 85652 RBC SED RATE AUTOMATED: CPT | Performed by: PHYSICIAN ASSISTANT

## 2017-11-06 PROCEDURE — J9299Q0 INV NIVOLUMAB BMS-936558 10 MG/ML 10 ML: Performed by: SPECIALIST

## 2017-11-06 PROCEDURE — 83615 LACTATE (LD) (LDH) ENZYME: CPT | Performed by: PHYSICIAN ASSISTANT

## 2017-11-06 PROCEDURE — 84481 FREE ASSAY (FT-3): CPT | Performed by: PHYSICIAN ASSISTANT

## 2017-11-06 PROCEDURE — 83690 ASSAY OF LIPASE: CPT | Performed by: PHYSICIAN ASSISTANT

## 2017-11-06 PROCEDURE — 83735 ASSAY OF MAGNESIUM: CPT | Performed by: PHYSICIAN ASSISTANT

## 2017-11-06 PROCEDURE — 96413 CHEMO IV INFUSION 1 HR: CPT

## 2017-11-06 PROCEDURE — 85025 COMPLETE CBC W/AUTO DIFF WBC: CPT | Performed by: PHYSICIAN ASSISTANT

## 2017-11-06 RX ORDER — SODIUM CHLORIDE 9 MG/ML
20 INJECTION, SOLUTION INTRAVENOUS CONTINUOUS
Status: DISCONTINUED | OUTPATIENT
Start: 2017-11-06 | End: 2017-11-09 | Stop reason: HOSPADM

## 2017-11-06 RX ADMIN — Medication 300 UNITS: at 13:45

## 2017-11-06 RX ADMIN — Medication 338 MG: at 12:37

## 2017-11-06 RX ADMIN — SODIUM CHLORIDE 20 ML/HR: 0.9 INJECTION, SOLUTION INTRAVENOUS at 11:30

## 2017-11-06 RX ADMIN — Medication 300 UNITS: at 09:40

## 2017-11-06 NOTE — PROGRESS NOTES
Patient arrived for central line blood work  Patient offers no complaints  Blood work drawn with port access  Port flushed per protocol  Patient left accessed for treatment later today   Declined AVS

## 2017-11-08 ENCOUNTER — TRANSCRIBE ORDERS (OUTPATIENT)
Dept: ADMINISTRATIVE | Facility: HOSPITAL | Age: 57
End: 2017-11-08

## 2017-11-08 DIAGNOSIS — C79.49 SECONDARY MALIGNANT NEOPLASM OF BRAIN AND SPINAL CORD (HCC): ICD-10-CM

## 2017-11-08 DIAGNOSIS — C43.0 MALIGNANT MELANOMA OF LIP (HCC): Primary | ICD-10-CM

## 2017-11-08 DIAGNOSIS — C79.31 SECONDARY MALIGNANT NEOPLASM OF BRAIN AND SPINAL CORD (HCC): ICD-10-CM

## 2017-11-24 ENCOUNTER — GENERIC CONVERSION - ENCOUNTER (OUTPATIENT)
Dept: OTHER | Facility: OTHER | Age: 57
End: 2017-11-24

## 2017-11-24 ENCOUNTER — HOSPITAL ENCOUNTER (OUTPATIENT)
Dept: INFUSION CENTER | Facility: CLINIC | Age: 57
Discharge: HOME | End: 2017-11-24
Payer: COMMERCIAL

## 2017-11-24 VITALS
DIASTOLIC BLOOD PRESSURE: 90 MMHG | WEIGHT: 244 LBS | RESPIRATION RATE: 16 BRPM | SYSTOLIC BLOOD PRESSURE: 148 MMHG | BODY MASS INDEX: 31.33 KG/M2 | HEART RATE: 93 BPM | OXYGEN SATURATION: 98 % | TEMPERATURE: 97.4 F

## 2017-11-24 LAB
ALBUMIN SERPL BCP-MCNC: 3 G/DL (ref 3.5–5)
ALP SERPL-CCNC: 72 U/L (ref 46–116)
ALT SERPL W P-5'-P-CCNC: 25 U/L (ref 12–78)
AMYLASE SERPL-CCNC: 28 IU/L (ref 25–115)
ANION GAP SERPL CALCULATED.3IONS-SCNC: 8 MMOL/L (ref 4–13)
AST SERPL W P-5'-P-CCNC: 21 U/L (ref 5–45)
BASOPHILS # BLD AUTO: 0.07 THOUSANDS/ΜL (ref 0–0.1)
BASOPHILS NFR BLD AUTO: 1 % (ref 0–1)
BILIRUB SERPL-MCNC: 0.2 MG/DL (ref 0.2–1)
BUN SERPL-MCNC: 11 MG/DL (ref 5–25)
CALCIUM SERPL-MCNC: 8.1 MG/DL (ref 8.3–10.1)
CHLORIDE SERPL-SCNC: 107 MMOL/L (ref 100–108)
CO2 SERPL-SCNC: 26 MMOL/L (ref 21–32)
CREAT SERPL-MCNC: 1.11 MG/DL (ref 0.6–1.3)
EOSINOPHIL # BLD AUTO: 0.95 THOUSAND/ΜL (ref 0–0.61)
EOSINOPHIL NFR BLD AUTO: 11 % (ref 0–6)
ERYTHROCYTE [DISTWIDTH] IN BLOOD BY AUTOMATED COUNT: 13.1 % (ref 11.6–15.1)
ERYTHROCYTE [SEDIMENTATION RATE] IN BLOOD: 6 MM/HOUR (ref 0–10)
GFR SERPL CREATININE-BSD FRML MDRD: 73 ML/MIN/1.73SQ M
GLUCOSE SERPL-MCNC: 96 MG/DL (ref 65–140)
HCT VFR BLD AUTO: 36.5 % (ref 36.5–49.3)
HGB BLD-MCNC: 11.7 G/DL (ref 12–17)
LDH SERPL-CCNC: 224 U/L (ref 81–234)
LIPASE SERPL-CCNC: 191 U/L (ref 73–393)
LYMPHOCYTES # BLD AUTO: 1.79 THOUSANDS/ΜL (ref 0.6–4.47)
LYMPHOCYTES NFR BLD AUTO: 21 % (ref 14–44)
MAGNESIUM SERPL-MCNC: 2 MG/DL (ref 1.6–2.6)
MCH RBC QN AUTO: 27.7 PG (ref 26.8–34.3)
MCHC RBC AUTO-ENTMCNC: 32.1 G/DL (ref 31.4–37.4)
MCV RBC AUTO: 86 FL (ref 82–98)
MONOCYTES # BLD AUTO: 0.67 THOUSAND/ΜL (ref 0.17–1.22)
MONOCYTES NFR BLD AUTO: 8 % (ref 4–12)
NEUTROPHILS # BLD AUTO: 5.09 THOUSANDS/ΜL (ref 1.85–7.62)
NEUTS SEG NFR BLD AUTO: 59 % (ref 43–75)
PLATELET # BLD AUTO: 384 THOUSANDS/UL (ref 149–390)
PMV BLD AUTO: 10.2 FL (ref 8.9–12.7)
POTASSIUM SERPL-SCNC: 4 MMOL/L (ref 3.5–5.3)
PROT SERPL-MCNC: 5.9 G/DL (ref 6.4–8.2)
RBC # BLD AUTO: 4.23 MILLION/UL (ref 3.88–5.62)
SODIUM SERPL-SCNC: 141 MMOL/L (ref 136–145)
T3FREE SERPL-MCNC: 2.54 PG/ML (ref 2.3–4.2)
T4 FREE SERPL-MCNC: 1.02 NG/DL (ref 0.76–1.46)
WBC # BLD AUTO: 8.57 THOUSAND/UL (ref 4.31–10.16)

## 2017-11-24 PROCEDURE — 82150 ASSAY OF AMYLASE: CPT | Performed by: SPECIALIST

## 2017-11-24 PROCEDURE — 84481 FREE ASSAY (FT-3): CPT | Performed by: SPECIALIST

## 2017-11-24 PROCEDURE — 84439 ASSAY OF FREE THYROXINE: CPT | Performed by: SPECIALIST

## 2017-11-24 PROCEDURE — 96413 CHEMO IV INFUSION 1 HR: CPT

## 2017-11-24 PROCEDURE — 83735 ASSAY OF MAGNESIUM: CPT | Performed by: SPECIALIST

## 2017-11-24 PROCEDURE — 85025 COMPLETE CBC W/AUTO DIFF WBC: CPT | Performed by: SPECIALIST

## 2017-11-24 PROCEDURE — 83690 ASSAY OF LIPASE: CPT | Performed by: SPECIALIST

## 2017-11-24 PROCEDURE — 85652 RBC SED RATE AUTOMATED: CPT | Performed by: SPECIALIST

## 2017-11-24 PROCEDURE — 83615 LACTATE (LD) (LDH) ENZYME: CPT | Performed by: SPECIALIST

## 2017-11-24 PROCEDURE — 80053 COMPREHEN METABOLIC PANEL: CPT | Performed by: SPECIALIST

## 2017-11-24 PROCEDURE — J9299Q0 INV NIVOLUMAB BMS-936558 10 MG/ML 10 ML: Performed by: PHYSICIAN ASSISTANT

## 2017-11-24 RX ORDER — SODIUM CHLORIDE 9 MG/ML
20 INJECTION, SOLUTION INTRAVENOUS CONTINUOUS
Status: DISCONTINUED | OUTPATIENT
Start: 2017-11-24 | End: 2017-11-27 | Stop reason: HOSPADM

## 2017-11-24 RX ADMIN — Medication 300 UNITS: at 16:41

## 2017-11-24 RX ADMIN — Medication 333 MG: at 15:47

## 2017-11-24 RX ADMIN — SODIUM CHLORIDE 20 ML/HR: 0.9 INJECTION, SOLUTION INTRAVENOUS at 14:42

## 2017-11-24 NOTE — PROGRESS NOTES
Patient here for labs in prep for treatment and is doing well, he offers no c/o  He tolerated lab draw without incident and was discharged for appointment downstairs    He will RTO post for treatment if cleared by MD

## 2017-11-24 NOTE — PLAN OF CARE
Problem: Potential for Falls  Goal: Patient will remain free of falls  INTERVENTIONS:  - Assess patient frequently for physical needs  -  Identify cognitive and physical deficits and behaviors that affect risk of falls    -  [place] fall precautions as indicated by assessment   - Educate patient/family on patient safety including physical limitations  - Instruct patient to call for assistance with activity based on assessment  - Modify environment to reduce risk of injury  - Consider OT/PT consult to assist with strengthening/mobility   Outcome: Progressing

## 2017-12-08 ENCOUNTER — HOSPITAL ENCOUNTER (OUTPATIENT)
Dept: MRI IMAGING | Facility: HOSPITAL | Age: 57
Discharge: HOME | End: 2017-12-08
Payer: COMMERCIAL

## 2017-12-08 ENCOUNTER — HOSPITAL ENCOUNTER (OUTPATIENT)
Dept: CT IMAGING | Facility: HOSPITAL | Age: 57
Discharge: HOME | End: 2017-12-08
Payer: COMMERCIAL

## 2017-12-08 DIAGNOSIS — C78.00 SECONDARY MALIGNANT NEOPLASM OF LUNG (HCC): ICD-10-CM

## 2017-12-08 DIAGNOSIS — C43.9 MALIGNANT MELANOMA OF SKIN (HCC): ICD-10-CM

## 2017-12-08 DIAGNOSIS — C79.31 SECONDARY MALIGNANT NEOPLASM OF BRAIN (HCC): ICD-10-CM

## 2017-12-08 PROCEDURE — 74176 CT ABD & PELVIS W/O CONTRAST: CPT

## 2017-12-08 PROCEDURE — A9585 GADOBUTROL INJECTION: HCPCS | Performed by: PHYSICIAN ASSISTANT

## 2017-12-08 PROCEDURE — 71250 CT THORAX DX C-: CPT

## 2017-12-08 PROCEDURE — 70553 MRI BRAIN STEM W/O & W/DYE: CPT

## 2017-12-08 RX ADMIN — GADOBUTROL 11 ML: 604.72 INJECTION INTRAVENOUS at 11:51

## 2017-12-11 ENCOUNTER — TRANSCRIBE ORDERS (OUTPATIENT)
Dept: ADMINISTRATIVE | Facility: HOSPITAL | Age: 57
End: 2017-12-11

## 2017-12-11 ENCOUNTER — ALLSCRIPTS OFFICE VISIT (OUTPATIENT)
Dept: OTHER | Facility: OTHER | Age: 57
End: 2017-12-11

## 2017-12-11 ENCOUNTER — HOSPITAL ENCOUNTER (OUTPATIENT)
Dept: INFUSION CENTER | Facility: CLINIC | Age: 57
Discharge: HOME | End: 2017-12-11
Payer: COMMERCIAL

## 2017-12-11 VITALS
RESPIRATION RATE: 18 BRPM | TEMPERATURE: 97.4 F | HEIGHT: 74 IN | SYSTOLIC BLOOD PRESSURE: 150 MMHG | BODY MASS INDEX: 30.84 KG/M2 | OXYGEN SATURATION: 93 % | WEIGHT: 240.3 LBS | DIASTOLIC BLOOD PRESSURE: 70 MMHG | HEART RATE: 93 BPM

## 2017-12-11 DIAGNOSIS — C43.9 MALIGNANT MELANOMA, UNSPECIFIED SITE (HCC): Primary | ICD-10-CM

## 2017-12-11 DIAGNOSIS — C78.00 MALIGNANT NEOPLASM METASTATIC TO LUNG, UNSPECIFIED LATERALITY (HCC): ICD-10-CM

## 2017-12-11 DIAGNOSIS — C79.31 SECONDARY MALIGNANT NEOPLASM OF BRAIN AND SPINAL CORD (HCC): ICD-10-CM

## 2017-12-11 DIAGNOSIS — C79.49 SECONDARY MALIGNANT NEOPLASM OF BRAIN AND SPINAL CORD (HCC): ICD-10-CM

## 2017-12-11 PROCEDURE — 96413 CHEMO IV INFUSION 1 HR: CPT

## 2017-12-11 PROCEDURE — J9299Q0 INV NIVOLUMAB BMS-936558 10 MG/ML 10 ML: Performed by: SPECIALIST

## 2017-12-11 RX ORDER — SODIUM CHLORIDE 9 MG/ML
20 INJECTION, SOLUTION INTRAVENOUS CONTINUOUS
Status: DISCONTINUED | OUTPATIENT
Start: 2017-12-11 | End: 2017-12-14 | Stop reason: HOSPADM

## 2017-12-11 RX ADMIN — Medication 300 UNITS: at 14:11

## 2017-12-11 RX ADMIN — SODIUM CHLORIDE 20 ML/HR: 0.9 INJECTION, SOLUTION INTRAVENOUS at 12:30

## 2017-12-11 RX ADMIN — Medication 326 MG: at 13:02

## 2017-12-11 NOTE — PROGRESS NOTES
Pt tolerated treatment well  Good blood return noted before, during and after chemo  CADD pump connected per protocol after 2 RN double checks  Pt knows to return 11/13/17 at 1100  Aware of next appt  AVS declined

## 2017-12-12 NOTE — PROGRESS NOTES
Assessment    1  Brain metastases (198 3) (C79 31)   2  Malignant melanoma (172 9) (C43 9)   3  Metastatic melanoma to lung (197 0,172  9) (C78 00)    Plan  Brain metastases, Malignant melanoma    · * MRI BRAIN W WO CONTRAST; Status:Active - Retrospective By Protocol Authorization; Requested VWW:[id number] 03:00PM;    Perform:[facility]; Order Comments:****RECIST READ*** per protocol -204bill to study -204; Due:19Jan2019; Last Updated By:Shane Sarah; 12/11/2017 11:24:17 AM;Ordered;metastases, Malignant melanoma; Ordered By:Chloe Perea;  Malignant melanoma, Metastatic melanoma to lung    · * CT CHEST ABDOMEN PELVIS WO CONTRAST; Status:Active - Retrospective ByProtocol Authorization; Requested TTS:[id number] 02:00PM;    Perform:[facility]; Order Comments:****RECIST READ****Bill to study -204; Due:19Jan2019; Last Updated By:Shane Sarah; 12/11/2017 11:24:17 AM;Ordered;melanoma, Metastatic melanoma to lung; Ordered By:Chloe Perea;  Metastatic melanoma to lung    · Follow-up visit in 2 weeks Evaluation and Treatment  Follow-up  Status: Hold For -Scheduling  Requested for: [id number]   Ordered;Metastatic melanoma to lung; Ordered By: Sandra Arreaga Performed:  Due: [id number]    Discussion/Summary  Discussion Summary:   Mr Alea Crawford is a 61year old male with metastatic melanoma, on Nivolumab  Alea Crawford has Metastatic Melanoma, on protocol [id number], s/p Ipi/Nivo combo, now on single agent Nivolumab, s/p 19 doses(last 1/3/17)  He continues on single agent Nivolumab 3 mg/kg every 2 weeks and is due for C38 today of Nivolumab (not in combination therapy)  His labs are pending and will be reviewed prior to treatment today  He had repeat imaging on 12/8/17 including an MRI of the brain, all of which were stable  He will completed two years of treatment on 12/26/17  Dequan Prasad, our clinical trials nurse, also saw the patient         Counseling Documentation With Imm: The patient was counseled regarding diagnostic results,-- impressions  total time of encounter was 25 minutes-- and-- 20 minutes was spent counseling  Goals and Barriers: The patent has the current Barriers: None  Patient's Capacity to Self-Care: Patient is able to Self-Care  Medication SE Review and Pt Understands Tx: Possible side effects of new medications were reviewed with the patient/guardian today  Clinical Trial  Clinical Trial:     The Clinical research nurse, Anoop Horn, saw the patient with me  Chief Complaint  Chief Complaint: Chief Complaint:  The patient presents to the office today with f/u malignant melanoma, on [id number] protocol, on Nivolumab  Chief Complaint Free Text Note Form: Metastatic melanoma to lung      History of Present Illness  Referring Provider: Dr Brenda Jacobo   HPI: Mr Barry Javed is a 61year-old male who [facility] with a cough and right flank pain  The patient had CT scans, which demonstrated what appeared to be diffuse metastatic disease with multiple pulmonary nodules, bulky mediastinal/hilar adenopathy, pleural based nodules, a right renal mass that measured 9 cm, retroperitoneal lymph nodes, and bone metastasis  A core biopsy of the right kidney was biopsied on 11/10/15 and was suspicious for malignant melanoma, HMB45 was positive  The BRAF mutation was tested at that time and was mutated  He did have an MRI of the brain on 11/24/15, which did show tiny punctate brain lesions (see radiology section for more details)  A PET/CT was obtained on 11/27/15 and confirmed diffuse metastatic disease, which was seen on the previous CT imaging  He had a fairly large right sided kidney mass  A core biopsy of the right kidney was obtained on 11/10/15 and was positive for malignant melanoma  He was also found to have metastatic disease to his brain  He completed three doses of Nivolumab/Ipilimumab on 2/23/16, he is s/p 3 doses   He did develop severe colitis and he was stared on high dose Prednisone on 2/23/16, he will start Nivolumab 3 mg/kg every 2 weeks starting on 4/25/16  Previous Therapy:   11/10/15 biopsy of the right kidney: high grade malignant neoplasm with melanocytic differentiation  HMB45 positive   Current Therapy: trial ([id number]) Ipilimumab in combination with Nivolumab- first treatment was 1/4/16 with continuation of single agent Nivolumab since 4/25/2016, 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, re-started treatment on 3/30/17   Disease Status: Stage IV malignant melanoma, stable disease   Interval History: Mr Helder Al continues on treatment with Nivolumab since 4/25/2016  the patient is doing well  He has a good energy level with an ECOG performance status of zero  He has a good appetite and his weight has been stable  He still has no taste or sense of smell, this has been stable  He also has stable xerostomia and hydrates well  He has no new issues/or skin lesions  He denies fevers, chills, CP, SOB, N/V, diarrhea, all other ROS are unremarkable  was reviewed  Tumor Markers: BRAF mutated      Review of Systems  Complete-Male:  Constitutional: No fever or chills, feels well, no tiredness, no recent weight gain or weight loss,-- no fever,-- not feeling poorly,-- no chills-- and-- not feeling tired  Eyes: No complaints of eye pain, no red eyes, no discharge from eyes, no itchy eyes  ENT: no complaints of earache, no hearing loss, no nosebleeds, no nasal discharge, no sore throat, no hoarseness-- and-- no sore throat  Cardiovascular: No complaints of slow heart rate, no fast heart rate, no chest pain, no palpitations, no leg claudication, no lower extremity,-- no chest pain,-- no palpitations-- and-- no extremity edema    Respiratory: No complaints of shortness of breath, no wheezing, no cough, no SOB on exertion, no orthopnea or PND,-- no shortness of breath,-- no cough,-- no wheezing-- and-- no shortness of breath during 1  Family history of hypertension (V17 49) (Z82 49)  Father    2  Family history of CAD (coronary artery disease)    Social History     ·    · Never a smoker   · No alcohol use    Current Meds   1  Flonase Allergy Relief 50 MCG/ACT Nasal Suspension; Therapy: (Recorded:[id number]) to Recorded   2  Levothyroxine Sodium 100 MCG Oral Tablet; TAKE 1 TABLET DAILY; Therapy: (Recorded:[id number]) to Recorded    Allergies  1  No Known Drug Allergies  2  IVP Dye    Vitals  Vital Signs    Recorded: [id number] 11:15AM   Temperature 96 F   Heart Rate 93   Respiration 16   Systolic 024   Diastolic 90   Height 6 ft 1 5 in   Weight 239 lb    BMI Calculated 31 1   BSA Calculated 2 33   O2 Saturation 98   Pain Scale 0       Physical Exam   Constitutional  General appearance: No acute distress, well appearing and well nourished  Eyes  Conjunctiva and lids: No swelling, erythema, or discharge  Pupils and irises: Equal, round and reactive to light  Ears, Nose, Mouth, and Throat  External inspection of ears and nose: Normal    Nasal mucosa, septum, and turbinates: Normal without edema or erythema  Oropharynx: Normal with no erythema, edema, exudate or lesions  Pulmonary  Respiratory effort: No increased work of breathing or signs of respiratory distress  Auscultation of lungs: Clear to auscultation, equal breath sounds bilaterally, no wheezes, no rales, no rhonci  Cardiovascular  Palpation of heart: Normal PMI, no thrills  Auscultation of heart: Normal rate and rhythm, normal S1 and S2, without murmurs  Examination of extremities for edema and/or varicosities: Normal    Abdomen  Abdomen: Non-tender, no masses  Liver and spleen: No hepatomegaly or splenomegaly  Lymphatic  Palpation of lymph nodes in neck: No lymphadenopathy  Musculoskeletal  Gait and station: Normal    Digits and nails: Normal without clubbing or cyanosis     Inspection/palpation of joints, bones, and muscles: Normal    Skin  Skin and subcutaneous tissue: Normal without rashes or lesions  Neurologic  Cranial nerves: Cranial nerves 2-12 intact  Sensation: No sensory loss  Psychiatric  Orientation to person, place and time: Normal    Mood and affect: Normal         ECOG 0       Results/Data  Results Form:   Results  Radiology 12/8/17 scans resulted below: stable  Lab Results 11/24/17 labs pending  * MRI BRAIN W WO CONTRAST [id number] 10:34AM Ronie Dakins Order Number: [id number]  Performing Comments: ****RECIST READ****  Per protocol [facility] [id number]   - Patient Instructions: Christina Se     Test Name Result Flag Reference   MRI BRAIN W WO CONTRAST (Report)       MRI BRAIN WITH AND WITHOUT CONTRAST   INDICATION: Stage IV melanoma, RECIST   COMPARISON: Multiple prior studies most recently 9/20/2017   TECHNIQUE:  Sagittal T1, axial T2, axial FLAIR, axial T1, axial Vernal, axial diffusion  Sagittal, axial and coronal T1 postcontrast  Axial BRAVO post contrast     IV Contrast: 11 mL of gadobutrol injection (MULTI-DOSE)     IMAGE QUALITY:  Diagnostic  FINDINGS:   BRAIN PARENCHYMA: There is no discrete mass, mass effect or midline shift  No abnormal white matter signal identified  Brainstem and cerebellum demonstrate normal signal  There is no intracranial hemorrhage  There is no evidence of acute infarction and  diffusion imaging is unremarkable  Postcontrast imaging of the brain demonstrates no abnormal enhancement  VENTRICLES: Normal    SELLA AND PITUITARY GLAND: Normal    ORBITS: Normal    PARANASAL SINUSES: Advanced pansinus disease is stable  There is near opacification of all paranasal sinuses with markedly hypertrophic mucosal changes  VASCULATURE: Evaluation of the major intracranial vasculature demonstrates appropriate flow voids  CALVARIUM AND SKULL BASE: Normal    EXTRACRANIAL SOFT TISSUES: Normal     IMPRESSION:   Stable MR appearance the brain  There is no evidence for metastatic disease   No evidence for tumor recurrence  Advanced pan sinusitis  Workstation performed: [id number]   Signed by: Cj Shabazz MD  12/8/17     * CT CHEST ABDOMEN PELVIS WO CONTRAST [id number] 10:33AM Chloe Perea Order Number: [id number]  Performing Comments: ****RECIST READ****   - Patient Instructions: Gomez Cola  Drink 1 bottle of barium at 9 PM the night before the scan  Drink 1/2 of 2nd bottle of barium 1 hour prior to the scan  Bring remainder with you to the scan  Nothing to eat for 3 hours prior to the scan  Clear liquids are OK  Test Name Result Flag Reference   CT CHEST ABDOMEN PELVIS WO CONTRAST (Report)       CT CHEST, ABDOMEN AND PELVIS WITHOUT IV CONTRAST   INDICATION: Melanoma   COMPARISON: September 20, 2017   TECHNIQUE: CT examination of the chest, abdomen and pelvis was performed without intravenous contrast  Reformatted images were created in axial, sagittal, and coronal planes  Radiation dose length product (DLP) for this visit: 989 mGy-cm   This examination, like all CT scans performed in the [facility], was performed utilizing techniques to minimize radiation dose exposure, including the use of iterative   reconstruction and automated exposure control  Enteric contrast was administered  FINDINGS:  RECIST INTERPRETATION:  TARGET LESIONS:  (F) 1  Left upper lung nodule: 5 x 4 mm image 4/19  Previously 5 x 4 mm image 4/18  (G) 2  Subcarinal lymph node: 14 x 7 mm image 2/30  Previously 15 x 8 mm image 2/30  (H) 3  Right retroperitoneal lower pole renal capsule lesion: 10 x 8 mm image 2/73  Previously 11 x 8 mm image 2/71  (I) 4  Left retroperitoneal lesion again not discretely measurable  Nontarget lesions:  (K) 1  Stable pulmonary metastases  (L) 2  Right hepatic dome lesion is not clearly visualized, possibly due to lack of intravenous contrast   (M) 3  Stable sclerotic osseous metastases  (N) 4   Hypervascular lesion in the anterior liver dome is not well evaluated on this noncontrast study  CHEST   LUNGS: Stable pulmonary nodules  Stable scarring  PLEURA: No significant pleural fluid   HEART/GREAT VESSELS: Unremarkable for patient's age  MEDIASTINUM AND BECKY: Stable shotty mediastinal nodes  CHEST WALL AND LOWER NECK:    Normal    ABDOMEN   LIVER/BILIARY TREE: Stable appearance  9 mm relatively low-attenuation lesion posterior right hepatic lobe image 2/53  GALLBLADDER: No calcified gallstones  No pericholecystic inflammatory change  SPLEEN: Unremarkable  PANCREAS: Unremarkable  ADRENAL GLANDS: Unremarkable  KIDNEYS/URETERS: Stable cystic lesions  Stable scarring lower pole right kidney extending from the rectus fascia to the lower pole cortex  Stable left hydronephrosis/hydroureter  No obstructing lesion identified  STOMACH AND BOWEL: Unremarkable  APPENDIX: No findings to suggest appendicitis  ABDOMINOPELVIC CAVITY: No ascites or free intraperitoneal air  No lymphadenopathy  VESSELS: Unremarkable for patient's age  PELVIS   REPRODUCTIVE ORGANS: Unremarkable for patient's age  Left hydrocele  URINARY BLADDER: Mild wall thickening suggesting cystitis  ABDOMINAL WALL/INGUINAL REGIONS:  Small fat-containing umbilical hernia  OSSEOUS STRUCTURES: Stable Subtle sclerotic presumed metastatic lesions lower lumbar upper sacral spine  Stable appearance of T12 compression fracture  Postop changes lower cervical spine  Chest port on the right  IMPRESSION:  Stable exam  No new metastatic lesions identified  Workstation performed: [id number]   Signed by: Caryl Ortiz DO  12/8/17     Future Appointments    Date/Time Provider Specialty Site   12/18/2017 10:00 AM CARLOS Arora  Medical Oncology [facility] [place]   12/27/2017 01:30 PM CALROS Arora  Medical Oncology [facility] [place]   01/09/2018 10:00 AM CARLOS Arora   Medical Oncology [facility] [place]       Signatures Electronically signed by :  CARLOS Urena ; Dec 11 2017 11:53AM EST                       (Author)

## 2017-12-27 ENCOUNTER — HOSPITAL ENCOUNTER (OUTPATIENT)
Dept: INFUSION CENTER | Facility: CLINIC | Age: 57
Discharge: HOME | End: 2017-12-29
Payer: COMMERCIAL

## 2017-12-27 ENCOUNTER — GENERIC CONVERSION - ENCOUNTER (OUTPATIENT)
Dept: OTHER | Facility: OTHER | Age: 57
End: 2017-12-27

## 2017-12-27 VITALS
SYSTOLIC BLOOD PRESSURE: 128 MMHG | BODY MASS INDEX: 30.98 KG/M2 | TEMPERATURE: 98.3 F | RESPIRATION RATE: 18 BRPM | HEIGHT: 74 IN | HEART RATE: 95 BPM | WEIGHT: 241.4 LBS | DIASTOLIC BLOOD PRESSURE: 80 MMHG | OXYGEN SATURATION: 97 %

## 2017-12-27 LAB
ALBUMIN SERPL BCP-MCNC: 3 G/DL (ref 3.5–5)
ALP SERPL-CCNC: 83 U/L (ref 46–116)
ALT SERPL W P-5'-P-CCNC: 22 U/L (ref 12–78)
AMYLASE SERPL-CCNC: 27 IU/L (ref 25–115)
ANION GAP SERPL CALCULATED.3IONS-SCNC: 8 MMOL/L (ref 4–13)
AST SERPL W P-5'-P-CCNC: 18 U/L (ref 5–45)
BASOPHILS # BLD AUTO: 0.05 THOUSANDS/ΜL (ref 0–0.1)
BASOPHILS NFR BLD AUTO: 1 % (ref 0–1)
BILIRUB SERPL-MCNC: 0.3 MG/DL (ref 0.2–1)
BUN SERPL-MCNC: 12 MG/DL (ref 5–25)
CALCIUM SERPL-MCNC: 8.5 MG/DL (ref 8.3–10.1)
CHLORIDE SERPL-SCNC: 110 MMOL/L (ref 100–108)
CO2 SERPL-SCNC: 26 MMOL/L (ref 21–32)
CREAT SERPL-MCNC: 1.14 MG/DL (ref 0.6–1.3)
EOSINOPHIL # BLD AUTO: 0.77 THOUSAND/ΜL (ref 0–0.61)
EOSINOPHIL NFR BLD AUTO: 11 % (ref 0–6)
ERYTHROCYTE [DISTWIDTH] IN BLOOD BY AUTOMATED COUNT: 12.9 % (ref 11.6–15.1)
ERYTHROCYTE [SEDIMENTATION RATE] IN BLOOD: 12 MM/HOUR (ref 0–10)
GFR SERPL CREATININE-BSD FRML MDRD: 70 ML/MIN/1.73SQ M
GLUCOSE SERPL-MCNC: 108 MG/DL (ref 65–140)
HCT VFR BLD AUTO: 34.4 % (ref 36.5–49.3)
HGB BLD-MCNC: 10.6 G/DL (ref 12–17)
LDH SERPL-CCNC: 175 U/L (ref 81–234)
LIPASE SERPL-CCNC: 190 U/L (ref 73–393)
LYMPHOCYTES # BLD AUTO: 1.38 THOUSANDS/ΜL (ref 0.6–4.47)
LYMPHOCYTES NFR BLD AUTO: 19 % (ref 14–44)
MAGNESIUM SERPL-MCNC: 1.8 MG/DL (ref 1.6–2.6)
MCH RBC QN AUTO: 25.3 PG (ref 26.8–34.3)
MCHC RBC AUTO-ENTMCNC: 30.8 G/DL (ref 31.4–37.4)
MCV RBC AUTO: 82 FL (ref 82–98)
MONOCYTES # BLD AUTO: 0.72 THOUSAND/ΜL (ref 0.17–1.22)
MONOCYTES NFR BLD AUTO: 10 % (ref 4–12)
NEUTROPHILS # BLD AUTO: 4.39 THOUSANDS/ΜL (ref 1.85–7.62)
NEUTS SEG NFR BLD AUTO: 59 % (ref 43–75)
PLATELET # BLD AUTO: 376 THOUSANDS/UL (ref 149–390)
PMV BLD AUTO: 9.6 FL (ref 8.9–12.7)
POTASSIUM SERPL-SCNC: 3.7 MMOL/L (ref 3.5–5.3)
PROT SERPL-MCNC: 6.1 G/DL (ref 6.4–8.2)
RBC # BLD AUTO: 4.19 MILLION/UL (ref 3.88–5.62)
SODIUM SERPL-SCNC: 144 MMOL/L (ref 136–145)
T3FREE SERPL-MCNC: 2.31 PG/ML (ref 2.3–4.2)
T4 FREE SERPL-MCNC: 0.95 NG/DL (ref 0.76–1.46)
TSH SERPL DL<=0.05 MIU/L-ACNC: 2.73 UIU/ML (ref 0.36–3.74)
WBC # BLD AUTO: 7.31 THOUSAND/UL (ref 4.31–10.16)

## 2017-12-27 PROCEDURE — 96413 CHEMO IV INFUSION 1 HR: CPT

## 2017-12-27 PROCEDURE — 84439 ASSAY OF FREE THYROXINE: CPT | Performed by: SPECIALIST

## 2017-12-27 PROCEDURE — 82150 ASSAY OF AMYLASE: CPT | Performed by: SPECIALIST

## 2017-12-27 PROCEDURE — 83615 LACTATE (LD) (LDH) ENZYME: CPT | Performed by: SPECIALIST

## 2017-12-27 PROCEDURE — J9299Q0 INV NIVOLUMAB BMS-936558 10 MG/ML 10 ML: Performed by: SPECIALIST

## 2017-12-27 PROCEDURE — 85025 COMPLETE CBC W/AUTO DIFF WBC: CPT | Performed by: SPECIALIST

## 2017-12-27 PROCEDURE — 84443 ASSAY THYROID STIM HORMONE: CPT | Performed by: SPECIALIST

## 2017-12-27 PROCEDURE — 85652 RBC SED RATE AUTOMATED: CPT | Performed by: SPECIALIST

## 2017-12-27 PROCEDURE — 84481 FREE ASSAY (FT-3): CPT | Performed by: SPECIALIST

## 2017-12-27 PROCEDURE — 83735 ASSAY OF MAGNESIUM: CPT | Performed by: SPECIALIST

## 2017-12-27 PROCEDURE — 80053 COMPREHEN METABOLIC PANEL: CPT | Performed by: SPECIALIST

## 2017-12-27 PROCEDURE — 83690 ASSAY OF LIPASE: CPT | Performed by: SPECIALIST

## 2017-12-27 RX ORDER — SODIUM CHLORIDE 9 MG/ML
20 INJECTION, SOLUTION INTRAVENOUS CONTINUOUS
Status: DISCONTINUED | OUTPATIENT
Start: 2017-12-27 | End: 2017-12-30 | Stop reason: HOSPADM

## 2017-12-27 RX ADMIN — SODIUM CHLORIDE 20 ML/HR: 0.9 INJECTION, SOLUTION INTRAVENOUS at 14:30

## 2017-12-27 RX ADMIN — Medication 300 UNITS: at 16:10

## 2017-12-27 RX ADMIN — Medication 300 UNITS: at 13:05

## 2017-12-27 RX ADMIN — Medication 329 MG: at 15:05

## 2017-12-27 NOTE — PROGRESS NOTES
Patient to [facility] for Nivolumab: Offers no complaints at present time: Lab work ( 12/27/17 ) reviewed:  Within parameters to treat: Right PAC remains accessed: Good blood return noted

## 2017-12-27 NOTE — PROGRESS NOTES
Tolerated procedure without incident: No adverse reactions noted: Right PAC remains intact for treatment this afternoon: Verified follow up appt with patient: Declined AVS

## 2017-12-27 NOTE — PROGRESS NOTES
Patient to [facility] for Lab testing / Catheter maintenance: Offers no complaints at present time: Right PAC accessed without difficulty: Good blood return noted: Labs drawn per MD order

## 2017-12-27 NOTE — PROGRESS NOTES
Tolerated infusion without incident: No adverse reactions noted:  Will notify unit if further appt's are needed: Declined AVS

## 2018-01-10 NOTE — PROGRESS NOTES
Assessment    1  Brain metastases (198 3) (C79 31)   2  Malignant melanoma (172 9) (C43 9)   3  Metastatic melanoma to lung (197 0,172  9) (C78 00,C43  9)    Plan  Metastatic melanoma to lung    · Follow-up visit in 1 week Evaluation and Treatment  Follow-up  Status: Hold For -  Scheduling  Requested for: [id number]   Ordered; For: Metastatic melanoma to lung; Ordered By: Maricel Leal Performed:  Due: [id number]    Discussion/Summary  Discussion Summary:   Mr Val Qureshi is a 63year-old male who presented to [facility] on 11/9/15 with a cough and right flank pain  The patient had CT scans, which demonstrated what appeared to be diffuse metastatic disease with multiple pulmonary nodules, bulky mediastinal/hilar adenopathy, pleural based nodules, a right renal mass that measured 9 cm, retroperitoneal lymph nodes, and bone metastasis  A core biopsy of the right kidney was biopsied on 11/10/15 and was suspicious for malignant melanoma, HMB45 was positive  The BRAF mutation was tested at that time and was mutated  He did have an MRI of the brain on 11/24/15, which did show tiny punctate brain lesions (see radiology section for more details)  A PET/CT was obtained on 11/27/15 and confirmed diffuse metastatic disease, which was seen on the previous CT imaging  The trial ([id number]) looks at giving Ipilimumab in combination with Nivolumab for patient's who have metastatic melanoma to the brain  We would not treat them with radiation therapy of any kind up front as we are seeing if these drugs cross the blood brain barrier  The patient has had one cycle of treatment, which was given on 1/4/16  The patient reports that he has had 3-4 episodes of loose stools that has been stable  HE is taking Imodium as needed and the diarrhea has been improving  He did have one episode of diffuse 10/10 left knee pain, we started him on Dexamethasone 0 75 mg daily, which has made the knee pain dissipate   He will follow up with us in one week  Coltilde Bains, our clinical research nurse, also saw the patient  I discussed the patient with Dr Kaia Cherry and he agrees with the above treatment plan  Chief Complaint  Chief Complaint: Chief Complaint:   The patient presents to the office today with Malignant melanoma  Chief Complaint Free Text Note Form: Melanoma      History of Present Illness  Referring Provider: Dr Berenice Jamison   HPI: Mr Haider Arroyo is a 63year-old male who presented to [facility] on 11/9/15 with a cough and right flank pain  The patient had CT scans, which demonstrated what appeared to be diffuse metastatic disease with multiple pulmonary nodules, bulky mediastinal/hilar adenopathy, pleural based nodules, a right renal mass that measured 9 cm, retroperitoneal lymph nodes, and bone metastasis  A core biopsy of the right kidney was biopsied on 11/10/15 and was suspicious for malignant melanoma, HMB45 was positive  The BRAF mutation was tested at that time and was mutated  He did have an MRI of the brain on 11/24/15, which did show tiny punctate brain lesions (see radiology section for more details)  A PET/CT was obtained on 11/27/15 and confirmed diffuse metastatic disease, which was seen on the previous CT imaging  Previous Therapy:   11/10/15 biopsy of the right kidney: high grade malignant neoplasm with melanocytic differentiation  HMB45 positive   Current Therapy: trial ([id number]) Ipilimumab in combination with Nivolumab- first treatment was 1/4/16   Disease Status: Stage IV malignant melanoma   Interval History: Mr Haider Arroyo is a 63year old male who presented to [facility] on 11/9/15 with a persistent cough and right sided flank pain  Unfortunately the patient was found to have diffuse metastatic disease  He had a fairly large right sided kidney mass  A core biopsy of the right kidney was obtained on 11/10/15 and was positive for malignant melanoma   He was also found to have metastatic disease to his brain  Overall the patient is doing well  His energy level has decreased slightly, his ECOG performance status is one  He has a decreased appetite, but he has had a cold  He has a cough and rhinorrhea, there is only clear sputum being produced  HE is taking Amoxacillin at this time  His diarrhea has also improved, he only had one bowel movement today  He did have one episode of 10/10 left knee pain that started suddenly, we placed him on 0 75 mg of Dexamethasone, the arthralgias resolved  He otherwise denies fevers, chills, CP, SOB, N/V, diarrhea, all other ROS are unremarkable  Tumor Markers: BRAF mutated      Review of Systems  Complete-Male:   Constitutional: recent weight loss, but as noted in HPI    The patient presents with complaints of mild feeling tired  Symptoms are made worse by exertion    The patient presents with complaints of bilateral lower back generalized pain, described as dull  bilateral kidney area   Eyes: No complaints of eye pain, no red eyes, no discharge from eyes, no itchy eyes  ENT: sore throat and taste changes, but no complaints of earache, no hearing loss, no nosebleeds, no nasal discharge, no sore throat, no hoarseness  Cardiovascular: No complaints of slow heart rate, no fast heart rate, no chest pain, no palpitations, no leg claudication, no lower extremity  Respiratory: cough and shortness of breath during exertion, but as noted in HPI  Gastrointestinal: diarrhea, but No complaints of abdominal pain, no constipation, no nausea or vomiting, no diarrhea or bloody stools  Genitourinary: No complaints of dysuria, no incontinence, no hesitancy, no nocturia, no genital lesion, no testicular pain  Musculoskeletal: lower back pain, but No complaints of arthralgia, no myalgias, no joint swelling or stiffness, no limb pain or swelling  Integumentary: No complaints of skin rash or skin lesions, no itching, no skin wound, no dry skin  Neurological: numbness and difficulty walking, but No compliants of headache, no confusion, no convulsions, no numbness or tingling, no dizziness or fainting, no limb weakness, no difficulty walking  Psychiatric: Is not suicidal, no sleep disturbances, no anxiety or depression, no change in personality, no emotional problems  Endocrine: No complaints of proptosis, no hot flashes, no muscle weakness, no erectile dysfunction, no deepening of the voice, no feelings of weakness  Hematologic/Lymphatic: No complaints of swollen glands, no swollen glands in the neck, does not bleed easily, no easy bruising  Active Problems    1  Acute pain (338 19) (R52)   2  Brain metastases (198 3) (C79 31)   3  Malignant melanoma (172 9) (C43 9)   4  Metastatic melanoma to lung (197 0,172  9) (C78 00,C43  9)    Surgical History    1  History of Appendectomy   2  History of Arthroscopy Knee Left   3  History of Arthroscopy Knee Right   4  History of Back Surgery   5  History of Complete Colonoscopy   6  History of Neuroplasty Decompression Median Nerve At Carpal Tunnel    Family History    1  Family history of hypertension (V17 49) (Z82 49)    2  Family history of CAD (coronary artery disease)    Social History    ·    · Never a smoker   · No alcohol use    Current Meds   1  Dexamethasone 0 75 MG Oral Tablet; take as directed; Therapy: [id number] to (Last Rx:14Jan2016)  Requested for: [id number] Ordered    Allergies    1  No Known Drug Allergies    Vitals  Vital Signs [Data Includes: Current Encounter]    Recorded: 05DBL6036 01:38PM   Temperature 97 7 F   Heart Rate 128   Respiration 18   Systolic 833   Diastolic 80   Height 6 ft 1 in   Weight 240 lb    BMI Calculated 31 66   BSA Calculated 2 32   O2 Saturation 98   Pain Scale 2     Physical Exam    Constitutional   General appearance: No acute distress, well appearing and well nourished  Eyes   Conjunctiva and lids: No swelling, erythema, or discharge      Pupils and irises: Equal, round and reactive to light  Ears, Nose, Mouth, and Throat   External inspection of ears and nose: Normal     Nasal mucosa, septum, and turbinates: Normal without edema or erythema  Oropharynx: Normal with no erythema, edema, exudate or lesions  Pulmonary   Respiratory effort: No increased work of breathing or signs of respiratory distress  Auscultation of lungs: Clear to auscultation, equal breath sounds bilaterally, no wheezes, no rales, no rhonci  Cardiovascular   Palpation of heart: Normal PMI, no thrills  Auscultation of heart: Normal rate and rhythm, normal S1 and S2, without murmurs  Examination of extremities for edema and/or varicosities: Normal     Abdomen   Abdomen: Non-tender, no masses  Liver and spleen: No hepatomegaly or splenomegaly  Lymphatic   Palpation of lymph nodes in neck: No lymphadenopathy  Musculoskeletal   Gait and station: Normal     Digits and nails: Normal without clubbing or cyanosis  Inspection/palpation of joints, bones, and muscles: Normal     Skin   Skin and subcutaneous tissue: Normal without rashes or lesions  Neurologic   Cranial nerves: Cranial nerves 2-12 intact  Sensation: No sensory loss  Psychiatric   Orientation to person, place and time: Normal     Mood and affect: Normal          Results/Data  Results Form:   Results   Pathology 11/10/15 right renal mass core biopsy: high grade malignant neoplasm with melanocytic differentiation, HMB45 positive  BRAF mutated  Radiology 11/24/15 MRI of brain: There are two punctate foci of enhancement seen in the left occipital lobe in an area where there is a small focus of abnormal signal on the noncontrast images, suspicious for tiny metastasis  There is at least one additional punctate focus of enhancement in the left medial parietal lobe also suspicious for tiny metastasis   There is an additional area of enhancement in the medial right frontal lobe, does not have a corresponding area of abnormal signal on the pre-gadolinium images and is not definitive for metastasis and may just relate to vessel  Lab Results 12/22/15 labs were reviewed  PET 11/27/15 Multiple areas of abnormal increased uptake compatible with diffuse metastatic disease  The following all demonstrate significant increased uptake  The maximum SUV is 11 8  Multiple pulmonary nodules  Multiple mediastinal and right hilar abnormally enlarged and avid nodes  Subpleural masses  Diaphragmatic area mass on the left  Lobulated mass in the right kidney region  Multiple retroperitoneal nodules, which demonstrate mild increased uptake  Bony metastasis  No definite abnormal hepatic activity  Future Appointments    Date/Time Provider Specialty Site   02/01/2016 09:00 AM CARLOS Arteaga  Hematology Oncology [facility]   02/15/2016 11:00 AM CARLOS Arteaga  Hematology Oncology CANCER [facility] [place]     Signatures   Electronically signed by : Bret Walton [facility]; Jan 19 2016  2:19PM EST                       (Co-author)    Electronically signed by :  Levonia Landau, M D ; Jan 19 2016  3:01PM EST                       (Author)

## 2018-01-12 VITALS
RESPIRATION RATE: 16 BRPM | SYSTOLIC BLOOD PRESSURE: 140 MMHG | TEMPERATURE: 94.1 F | OXYGEN SATURATION: 98 % | DIASTOLIC BLOOD PRESSURE: 82 MMHG | BODY MASS INDEX: 32.21 KG/M2 | HEIGHT: 74 IN | WEIGHT: 251 LBS | HEART RATE: 100 BPM

## 2018-01-12 VITALS
RESPIRATION RATE: 16 BRPM | HEART RATE: 98 BPM | TEMPERATURE: 97.5 F | BODY MASS INDEX: 32.73 KG/M2 | WEIGHT: 255 LBS | SYSTOLIC BLOOD PRESSURE: 148 MMHG | OXYGEN SATURATION: 98 % | DIASTOLIC BLOOD PRESSURE: 88 MMHG | HEIGHT: 74 IN

## 2018-01-12 VITALS
HEIGHT: 74 IN | RESPIRATION RATE: 18 BRPM | DIASTOLIC BLOOD PRESSURE: 78 MMHG | TEMPERATURE: 96.2 F | HEART RATE: 106 BPM | OXYGEN SATURATION: 98 % | SYSTOLIC BLOOD PRESSURE: 132 MMHG | WEIGHT: 256 LBS | BODY MASS INDEX: 32.85 KG/M2

## 2018-01-13 VITALS
BODY MASS INDEX: 31.81 KG/M2 | SYSTOLIC BLOOD PRESSURE: 140 MMHG | TEMPERATURE: 97.9 F | HEIGHT: 73 IN | WEIGHT: 240 LBS | HEART RATE: 100 BPM | RESPIRATION RATE: 16 BRPM | OXYGEN SATURATION: 99 % | DIASTOLIC BLOOD PRESSURE: 90 MMHG

## 2018-01-13 VITALS
BODY MASS INDEX: 32.74 KG/M2 | DIASTOLIC BLOOD PRESSURE: 90 MMHG | RESPIRATION RATE: 16 BRPM | TEMPERATURE: 97.8 F | HEART RATE: 111 BPM | OXYGEN SATURATION: 98 % | HEIGHT: 73 IN | WEIGHT: 247 LBS | SYSTOLIC BLOOD PRESSURE: 144 MMHG

## 2018-01-13 VITALS
TEMPERATURE: 97.2 F | HEIGHT: 73 IN | HEART RATE: 96 BPM | SYSTOLIC BLOOD PRESSURE: 138 MMHG | WEIGHT: 247 LBS | OXYGEN SATURATION: 96 % | BODY MASS INDEX: 32.74 KG/M2 | RESPIRATION RATE: 16 BRPM | DIASTOLIC BLOOD PRESSURE: 78 MMHG

## 2018-01-13 VITALS
HEART RATE: 128 BPM | SYSTOLIC BLOOD PRESSURE: 150 MMHG | RESPIRATION RATE: 16 BRPM | TEMPERATURE: 98.8 F | OXYGEN SATURATION: 98 % | DIASTOLIC BLOOD PRESSURE: 90 MMHG | BODY MASS INDEX: 33.66 KG/M2 | WEIGHT: 254 LBS | HEIGHT: 73 IN

## 2018-01-14 VITALS
HEART RATE: 88 BPM | WEIGHT: 243 LBS | BODY MASS INDEX: 31.18 KG/M2 | DIASTOLIC BLOOD PRESSURE: 90 MMHG | RESPIRATION RATE: 16 BRPM | HEIGHT: 74 IN | SYSTOLIC BLOOD PRESSURE: 150 MMHG | OXYGEN SATURATION: 97 % | TEMPERATURE: 97 F

## 2018-01-14 VITALS
RESPIRATION RATE: 16 BRPM | SYSTOLIC BLOOD PRESSURE: 142 MMHG | HEIGHT: 73 IN | DIASTOLIC BLOOD PRESSURE: 80 MMHG | OXYGEN SATURATION: 92 % | HEART RATE: 97 BPM | WEIGHT: 232 LBS | BODY MASS INDEX: 30.75 KG/M2 | TEMPERATURE: 96.3 F

## 2018-01-14 VITALS
RESPIRATION RATE: 16 BRPM | HEART RATE: 107 BPM | BODY MASS INDEX: 33.66 KG/M2 | WEIGHT: 254 LBS | HEIGHT: 73 IN | OXYGEN SATURATION: 98 % | DIASTOLIC BLOOD PRESSURE: 84 MMHG | SYSTOLIC BLOOD PRESSURE: 158 MMHG | TEMPERATURE: 97.4 F

## 2018-01-14 VITALS
BODY MASS INDEX: 32.74 KG/M2 | DIASTOLIC BLOOD PRESSURE: 76 MMHG | TEMPERATURE: 97.3 F | WEIGHT: 247 LBS | RESPIRATION RATE: 16 BRPM | HEART RATE: 98 BPM | HEIGHT: 73 IN | SYSTOLIC BLOOD PRESSURE: 130 MMHG | OXYGEN SATURATION: 98 %

## 2018-01-14 VITALS
TEMPERATURE: 96.3 F | RESPIRATION RATE: 18 BRPM | HEIGHT: 74 IN | WEIGHT: 256 LBS | SYSTOLIC BLOOD PRESSURE: 152 MMHG | DIASTOLIC BLOOD PRESSURE: 52 MMHG | BODY MASS INDEX: 32.85 KG/M2 | OXYGEN SATURATION: 98 % | HEART RATE: 98 BPM

## 2018-01-14 VITALS
DIASTOLIC BLOOD PRESSURE: 82 MMHG | HEART RATE: 111 BPM | BODY MASS INDEX: 34.06 KG/M2 | TEMPERATURE: 96 F | HEIGHT: 73 IN | WEIGHT: 257 LBS | OXYGEN SATURATION: 98 % | RESPIRATION RATE: 18 BRPM | SYSTOLIC BLOOD PRESSURE: 148 MMHG

## 2018-01-14 VITALS
OXYGEN SATURATION: 98 % | WEIGHT: 251 LBS | TEMPERATURE: 96.7 F | HEART RATE: 98 BPM | HEIGHT: 73 IN | SYSTOLIC BLOOD PRESSURE: 142 MMHG | DIASTOLIC BLOOD PRESSURE: 84 MMHG | BODY MASS INDEX: 33.27 KG/M2 | RESPIRATION RATE: 16 BRPM

## 2018-01-14 VITALS
HEART RATE: 80 BPM | OXYGEN SATURATION: 98 % | HEIGHT: 73 IN | BODY MASS INDEX: 32.74 KG/M2 | DIASTOLIC BLOOD PRESSURE: 78 MMHG | SYSTOLIC BLOOD PRESSURE: 132 MMHG | RESPIRATION RATE: 16 BRPM | WEIGHT: 247 LBS | TEMPERATURE: 95.1 F

## 2018-01-14 NOTE — MISCELLANEOUS
Message   Recorded as Task   Date: 01/14/2016 12:14 PM, Created By: Renata Mckeon   Task Name: Follow Up   Assigned To: Davee Osler   Regarding Patient: Aubrey Ernandez, Status: Active   Comment:    Renata Mckeon - 14 Jan 2016 12:14 PM     TASK CREATED  Caller: Self; General Medical Question; [phone] (Mobile Phone)  Latasha Rodríguez / ARASELI SIM PT, W[facility] [address] [facility] [place]  HE HAS A F/U ON TUES 1/19/15   tct patient, reporting that he woke up this am and had pain in left leg from knee down  States that he cannot put pressure on leg without pain and has pain in R shoulder which he describes as feeling as if he has a rotator cuff tear  Denies any report of injury to either limb  Denies swelling or redness  s/w MARISEL Holm and will order Dexamethasone 0 75mg once daily with food  If worsens over the weekend pt should go to ER to have evaluated and/or xray done  Pt should call us on Monday  Reveiwed side effects of steroids  Pt verb understanding  Active Problems    1  Acute pain (338 19) (R52)   2  Brain metastases (198 3) (C79 31)   3  Malignant melanoma (172 9) (C43 9)   4  Metastatic melanoma to lung (197 0,172  9) (C78 00,C43  9)    Current Meds   1  Dexamethasone 0 75 MG Oral Tablet; take as directed; Therapy: [id number] to (Last Rx:14Jan2016)  Requested for: [id number] Ordered    Allergies    1   No Known Drug Allergies    Signatures   Electronically signed by : Davion Hinton RN; Jan 14 2016  3:20PM EST                       (Author)

## 2018-01-16 NOTE — PROGRESS NOTES
Assessment    1  Brain metastases (198 3) (C79 31)   2  Malignant melanoma (172 9) (C43 9)   3  Metastatic melanoma to lung (197 0,172  9) (C78 00)    Plan  Metastatic melanoma to lung    · Follow-up visit in 2 weeks Evaluation and Treatment  Follow-up  Status: Hold For -  Scheduling  Requested for: 12Sep2017   Ordered; For: Metastatic melanoma to lung; Ordered By: Chloe Knox Performed:  Due: [id number]                          (1) AMYLASE; Status:Active - Retrospective By Protocol Authorization; Requested for:11Apr2017;   Perform:[facility]; Due:11Apr2018; Last Updated By:Long Sarah; 3/30/2017 12:38:06 PM;Ordered; For:Malignant melanoma; Ordered By:Chloe Perea;   (1) CBC/PLT/DIFF; Status:Active - Retrospective By Protocol Authorization; Requested for:11Apr2017;   Perform:[facility]; Due:11Apr2018; Last Updated By:Long Sarah; 3/30/2017 12:38:06 PM;Ordered; For:Malignant melanoma; Ordered By:Chloe Perea;   (1) COMPREHENSIVE METABOLIC PANEL; Status:Active - Retrospective By Protocol Authorization; Requested for:11Apr2017;   Perform:[facility]; Due:11Apr2018; Last Updated By:Long Sarah; 3/30/2017 12:38:06 PM;Ordered; For:Malignant melanoma; Ordered By:Chloe Perea;   (1) FREE T3; Status:Active - Retrospective By Protocol Authorization; Requested for:11Apr2017;   Perform:[facility]; Due:11Apr2018; Last Updated By:Long Sarah; 3/30/2017 12:38:06 PM;Ordered; For:Malignant melanoma; Ordered By:Chloe Perea;   (1) LD (LDH); Status:Active - Retrospective By Protocol Authorization; Requested for:11Apr2017;   Perform:[facility]; Due:11Apr2018; Last Updated By:Long Sarah; 3/30/2017 12:38:06 PM;Ordered; For:Malignant melanoma; Ordered By:Schippers, Chloe;   (1) LIPASE; Status:Active - Retrospective By Protocol Authorization; Requested for:[id number];   Perform:[facility]; Due:[id number];  Last Updated By:Long Sarah; 3/30/2017 12:38:06 PM;Ordered; For:Malignant melanoma; Ordered By:Chloe Perea;   (1) MAGNESIUM; Status:Active - Retrospective By Protocol Authorization; Requested for:11Apr2017;   Perform:[facility]; Due:11Apr2018; Last Updated By:Wilson Sarah; 3/30/2017 12:38:06 PM;Ordered; For:Malignant melanoma; Ordered By:Chloe Perea;   (1) T4, FREE; Status:Active - Retrospective By Protocol Authorization; Requested for:[id number];   Perform:[facility]; Due:11Apr2018; Last Updated By:Wilson Sarah; 3/30/2017 12:38:06 PM;Ordered; For:Malignant melanoma; Ordered By:Chloe Perea;   (1) TSH; Status:Active - Retrospective By Protocol Authorization; Requested for:11Apr2017;   Perform:[facility]; Due:11Apr2018; Last Updated By:Wilson Sarah; 3/30/2017 12:38:06 PM;Ordered; For:Malignant melanoma; Ordered By:Chloe Perea;   (1) SED RATE; Status:Active - Retrospective By Protocol Authorization; Requested for:11Apr2017;   Perform:[facility]; Due:11Apr2018; Last Updated By:Wilson Sarah; 3/30/2017 12:38:06 PM;Ordered; For:Malignant melanoma; Ordered By:Chloe Perea;      Discussion/Summary  Discussion Summary:   Mr Bethany Levi is a 61year old male with metastatic melanoma, on Nivolumab  Mr Bethany Levi has Metastatic Melanoma, on protocol [id number], s/p Ipi/Nivo combo, now on single agent Nivolumab, s/p 19 doses(last 1/3/17)    He continues on single agent Nivolumab 3 mg/kg every 2 weeks and has now completed 31 doses of Nivolumab (not in combination therapy)  His treatment was delayed secondary to immune mediated colitis  He is due for cycle 32 of Nivolumab today  Overall he is doing well  His labs were pending  He had repeat imaging at the end of June, which shows stable disease  He will have scans at the end of September  Brant Gallardo, our clinical trials nurse, also saw the patient  He will follow up in the clinic in two weeks          Counseling Documentation With Imm: The patient, patient's family was counseled regarding diagnostic results, impressions  total time of encounter was 25 minutes and 20 minutes was spent counseling  Goals and Barriers: The patent has the current Barriers: None  Patient's Capacity to Self-Care: Patient is able to Self-Care  Medication SE Review and Pt Understands Tx: Possible side effects of new medications were reviewed with the patient/guardian today  Clinical Trial  Clinical Trial:     The Clinical research nurse, Alana Roy, saw the patient with me  Chief Complaint  Chief Complaint: Chief Complaint:   The patient presents to the office today with f/u malignant melanoma, on [id number] protocol, on Nivolumab  Chief Complaint Free Text Note Form: Metastatic melanoma to lung      History of Present Illness  Referring Provider: Dr Brando Jenkins   HPI: Mr Bunny Rios is a 61year-old male who [facility]15 with a cough and right flank pain  The patient had CT scans, which demonstrated what appeared to be diffuse metastatic disease with multiple pulmonary nodules, bulky mediastinal/hilar adenopathy, pleural based nodules, a right renal mass that measured 9 cm, retroperitoneal lymph nodes, and bone metastasis  A core biopsy of the right kidney was biopsied on 11/10/15 and was suspicious for malignant melanoma, HMB45 was positive  The BRAF mutation was tested at that time and was mutated  He did have an MRI of the brain on 11/24/15, which did show tiny punctate brain lesions (see radiology section for more details)  A PET/CT was obtained on 11/27/15 and confirmed diffuse metastatic disease, which was seen on the previous CT imaging  He had a fairly large right sided kidney mass  A core biopsy of the right kidney was obtained on 11/10/15 and was positive for malignant melanoma  He was also found to have metastatic disease to his brain  He completed three doses of Nivolumab/Ipilimumab on 2/23/16, he is s/p 3 doses   He did develop severe colitis and he was stared on high dose Prednisone on 2/23/16, he will start Nivolumab 3 mg/kg every 2 weeks starting on 4/25/16  Previous Therapy:   11/10/15 biopsy of the right kidney: high grade malignant neoplasm with melanocytic differentiation  HMB45 positive   Current Therapy: trial ([id number]) Ipilimumab in combination with Nivolumab- first treatment was 1/4/16 with continuation of single agent Nivolumab since 4/25/2016, 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, re-started treatment on 3/30/17   Disease Status: Stage IV malignant melanoma, stable disease   Interval History: Mr Val Qureshi continues on treatment with Nivolumab since 4/25/2016  Overall he has tolerated treatment well  He has not received Nivolumab since the beginning of January secondary to immune mediated colitis but restarted treatment on 3/28/17  He is due for cycle 32 of Nivolumab today  He has a good energy level with an ECOG performance status of zero  He has a good appetite and his weight has been stable  He continues to have xerostomia, it has been fairly stable  He also continues to have a decreased sense of smell, which has also been stable  He also has stable dysgeusia  He has no new complaints today  He otherwise denies fevers, chills, CP, SOB, nausea, headaches, visual changes, etc    PFSH was reviewed  Tumor Markers: BRAF mutated      Review of Systems  Complete-Male:   Constitutional: No fever or chills, feels well, no tiredness, no recent weight gain or weight loss, no fever, not feeling poorly, no recent weight gain, no chills, not feeling tired and no recent weight loss  Eyes: No complaints of eye pain, no red eyes, no discharge from eyes, no itchy eyes and no eyesight problems  ENT: no complaints of earache, no hearing loss, no nosebleeds, no nasal discharge, no sore throat, no hoarseness, no sore throat and no hearing loss     Cardiovascular: No complaints of slow heart rate, no fast heart rate, no chest pain, no palpitations, no leg claudication, no lower extremity, no chest pain and no extremity edema  Respiratory: No complaints of shortness of breath, no wheezing, no cough, no SOB on exertion, no orthopnea or PND, no shortness of breath, no cough and no shortness of breath during exertion  Gastrointestinal: No complaints of abdominal pain, no constipation, no nausea or vomiting, no diarrhea or bloody stools, no abdominal pain, no nausea, no vomiting, no constipation and no diarrhea  Genitourinary: No complaints of dysuria, no incontinence, no hesitancy, no nocturia, no genital lesion, no testicular pain  Musculoskeletal: No complaints of arthralgia, no myalgias, no joint swelling or stiffness, no limb pain or swelling, no limb pain and no limb swelling  Integumentary: No complaints of skin rash or skin lesions, no itching, no skin wound, no dry skin, no rashes and no itching  Neurological: No compliants of headache, no confusion, no convulsions, no numbness or tingling, no dizziness or fainting, no limb weakness, no difficulty walking, no headache, no numbness, no tingling, no dizziness and no difficulty walking  Psychiatric: Is not suicidal, no sleep disturbances, no anxiety or depression, no change in personality, no emotional problems, no anxiety and no depression  Endocrine: No complaints of proptosis, no hot flashes, no muscle weakness, no erectile dysfunction, no deepening of the voice, no feelings of weakness and no hot flashes  Hematologic/Lymphatic: No complaints of swollen glands, no swollen glands in the neck, does not bleed easily, no easy bruising, no tendency for easy bleeding and no tendency for easy bruising  Other Symptoms: decreased taste  ROS Reviewed:   ROS reviewed  Active Problems    1  Acute pain (338 19) (R52)   2  Brain metastases (198 3) (C79 31)   3  Colitis, acute (558 9) (K52 9)   4  Dye allergic reaction (995 27,E947 4) (T50 990S)   5   High risk medication use (V58 69) (Z79 899)   6  Malignant melanoma (172 9) (C43 9)   7  Metastatic melanoma to lung (197 0,172  9) (C78 00)   8  Non-intractable vomiting with nausea, vomiting of unspecified type    Surgical History    1  History of Appendectomy   2  History of Arthroscopy Knee Left   3  History of Arthroscopy Knee Right   4  History of Back Surgery   5  History of Complete Colonoscopy   6  History of Neuroplasty Decompression Median Nerve At Carpal Tunnel  Surgical History Reviewed: The surgical history was reviewed and updated today  Family History  Mother    1  Family history of hypertension (V17 49) (Z82 49)  Father    2  Family history of CAD (coronary artery disease)  Family History Reviewed: The family history was reviewed and updated today  Social History    ·    · Never a smoker   · No alcohol use  Social History Reviewed: The social history was reviewed and is unchanged  Current Meds   1  Flonase Allergy Relief 50 MCG/ACT Nasal Suspension; Therapy: (Recorded:[id number]) to Recorded   2  Levothyroxine Sodium 100 MCG Oral Tablet; TAKE 1 TABLET DAILY; Therapy: (Recorded:[id number]) to Recorded  Medication List Reviewed: The medication list was reviewed and updated today  Allergies    1  No Known Drug Allergies    2  IVP Dye    Vitals  Vital Signs    Recorded: [id number] 02:19PM   Temperature 96 5 F   Heart Rate 99   Respiration 18   Systolic 629   Diastolic 88   Height 6 ft 1 in   Weight 246 lb 8 0 oz   BMI Calculated 32 52   BSA Calculated 2 35   O2 Saturation 95   Pain Scale 0     Physical Exam    Constitutional   General appearance: No acute distress, well appearing and well nourished  Eyes   Conjunctiva and lids: No swelling, erythema, or discharge  Pupils and irises: Equal, round and reactive to light      Ears, Nose, Mouth, and Throat   External inspection of ears and nose: Normal     Nasal mucosa, septum, and turbinates: Normal without edema or erythema  Oropharynx: Normal with no erythema, edema, exudate or lesions  Pulmonary   Respiratory effort: No increased work of breathing or signs of respiratory distress  Auscultation of lungs: Clear to auscultation, equal breath sounds bilaterally, no wheezes, no rales, no rhonci  Cardiovascular   Palpation of heart: Normal PMI, no thrills  Auscultation of heart: Normal rate and rhythm, normal S1 and S2, without murmurs  Examination of extremities for edema and/or varicosities: Normal     Abdomen   Abdomen: Non-tender, no masses  Liver and spleen: No hepatomegaly or splenomegaly  Lymphatic   Palpation of lymph nodes in neck: No lymphadenopathy  Musculoskeletal   Gait and station: Normal     Digits and nails: Normal without clubbing or cyanosis  Inspection/palpation of joints, bones, and muscles: Normal     Skin   Skin and subcutaneous tissue: Abnormal   vitiligo- stable  Neurologic   Cranial nerves: Cranial nerves 2-12 intact  Cranial Nerves III, IV, and VI: the extraocular motions were intact  Cranial Nerve V: sensation to the face and masseter strength were intact  Cranial Nerve VII: facial strength was intact bilaterally  Cranial Nerves IX and X: there was normal movement of the soft palate and normal gag  Cranial Nerve XI: shoulder shrug was intact bilaterally  Sensation: No sensory loss      Psychiatric   Orientation to person, place and time: Normal     Mood and affect: Normal         ECOG 0       Results/Data                    24 Apr 2017 10:00 AM    (1) AMYLASE        AMYLASE 28   24 Apr 2017 10:00 AM    (1) CBC/PLT/DIFF        WBC COUNT 7 88        RBC COUNT 4 67        HEMOGLOBIN 13 9        HEMATOCRIT 42 1        MCV 90        MCH 29 8        MCHC 33 0        RDW 13 9        MPV 10 3        PLATELET COUNT 017        NEUTROPHILS RELATIVE PERCENT 64        LYMPHOCYTES RELATIVE PERCENT 17        MONOCYTES RELATIVE PERCENT 14   24 Apr 2017 10:00 AM    (1) COMPREHENSIVE METABOLIC PANEL        SODIUM 141        POTASSIUM 4 1        CHLORIDE 104        CARBON DIOXIDE 28        ANION GAP (CALC) 9        BLOOD UREA NITROGEN 10        CREATININE 1 28        GLUCOSE,RANDM 106        CALCIUM 9 1        EOSINOPHILS RELATIVE PERCENT 5        BASOPHILS RELATIVE PERCENT 0        NEUTROPHILS ABSOLUTE COUNT 5 06        LYMPHOCYTES ABSOLUTE COUNT 1 34        MONOCYTES ABSOLUTE COUNT 1 06        EOSINOPHILS ABSOLUTE COUNT 0 39        BASOPHILS ABSOLUTE COUNT 0 03   24 Apr 2017 10:00 AM    (1) TSH        TSH 4 308   24 Apr 2017 10:00 AM    (1) T4, FREE        T4,FREE 0 81   24 Apr 2017 10:00 AM    (1) MAGNESIUM        MAGNESIUM 1 9   24 Apr 2017 10:00 AM    (1) LIPASE        LIPASE 185   24 Apr 2017 10:00 AM    (1) LD (LDH)        LD (LDH) 247   24 Apr 2017 10:00 AM    (1) FREE T3        FREE T3 2 79        AST(SGOT) 26        ALT (SGPT) 36        ALK PHOSPHATAS 66        TOTAL PROTEIN 7 1        ALBUMIN 3 7        BILI, TOTAL 0 50        eGFR Non- 57 9    Results Form:   Results   Lab Results 8/28/17 labs pending  Future Appointments    Date/Time Provider Specialty Site   09/25/2017 09:30 AM CARLOS Singh   Medical On[facility]     Signatures   Electronically signed by : Rafa Ribera, [facility]; Sep 12 2017  3:49PM EST                       (Author)

## 2018-01-16 NOTE — PROGRESS NOTES
Assessment    1  Metastatic melanoma to lung (197 0,172  9) (C78 00)    Discussion/Summary  Discussion Summary:   Mr Efren Amador is a 61year old male with metastatic melanoma as described in HPI  11/9/2016:  #1 Metastatic Melanoma, on protocol [id number], s/p Ipi/Nivo combo, now on single agent Nivolumab, s/p 14 doses(last 10/24/2016)    He continues on single agent Nivolumab 3 mg/kg every 2 weeks and has now completed 14 doses of Nivolumab (not in combination therapy)  He is tolerating treatment very well  His labs were reviewed and satisfactory for treatment today  #2 Contrast dye allergy  He has had delayed hypersensitivity reaction to CT imaging contrast in the past  Therefore his regimen is as follows:  -Methylprednisone 32 mg PO 12 hours and 2 hours prior to contrast administration  -Ranitidine 150 mg PO x 3 days starting the day prior to contrast administration  -Benadryl 50 mg prior to contrast administration and 1 day afterward  We will see him back in 2 weeks with routine laboratory studies  He understands to contact us in the interim if he has questions or concerns  Medication SE Review and Pt Understands Tx: Possible side effects of new medications were reviewed with the patient/guardian today  Chief Complaint  Chief Complaint: Chief Complaint:   The patient presents to the office today with f/u malignant melanoma, on [id number] protocol, on Nivolumab, s/p dose 14 (last 10/24/2016)  History of Present Illness  Referring Provider: Dr Jhon Kirkpatrick   HPI: Mr Efren Amador is a 61year-old male who [place] on 11[place]9/15 with a cough and right flank pain  The patient had CT scans, which demonstrated what appeared to be diffuse metastatic disease with multiple pulmonary nodules, bulky mediastinal/hilar adenopathy, pleural based nodules, a right renal mass that measured 9 cm, retroperitoneal lymph nodes, and bone metastasis   A core biopsy of the right kidney was biopsied on 11/10/15 and was suspicious for malignant melanoma, HMB45 was positive  The BRAF mutation was tested at that time and was mutated  He did have an MRI of the brain on 11/24/15, which did show tiny punctate brain lesions (see radiology section for more details)  A PET/CT was obtained on 11/27/15 and confirmed diffuse metastatic disease, which was seen on the previous CT imaging  He had a fairly large right sided kidney mass  A core biopsy of the right kidney was obtained on 11/10/15 and was positive for malignant melanoma  He was also found to have metastatic disease to his brain  He completed three doses of Nivolumab/Ipilimumab on 2/23/16, he is s/p 3 doses  He did develop severe colitis and he was stared on high dose Prednisone on 2/23/16, he has not been tapered to 10 mg of Prednisone every other day and will start Nivolumab 3 mg/kg every 2 weeks starting on 4/25/16  Previous Therapy:   11/10/15 biopsy of the right kidney: high grade malignant neoplasm with melanocytic differentiation  HMB45 positive   Current Therapy: trial ([id number]) Ipilimumab in combination with Nivolumab- first treatment was 1/4/16 with continuation of single agent Nivolumab since 4/25/2016   Disease Status: Stage IV malignant melanoma   Interval History:   11/9/2016:   Mr Troy Dakin continues on treatment with Nivolumab since 4/25/2016  He is due for dose 15 today  He has no complaints, no change in fatigue and his usual 3-4 loose stools daily  He successfully hiked 9 miles this past weekend  Denies anorexia, weight loss, fevers, chills, sweats, CP, SOB, N/V or rash  He denies neurologic symptoms: no HA, acute memory loss, dizziness, visual changes  He has baseline decrease in hearing which is unchanged  The remainder of a 12 point ROS is negative  ECOG = 0  PFSH was reviewed      Tumor Markers: BRAF mutated      Review of Systems  Complete-Male:   Constitutional: no fever, no recent weight gain, no chills, not feeling tired and no recent weight loss  Eyes: no eyesight problems  ENT: no sore throat and no hearing loss  Cardiovascular: no chest pain and no palpitations  Respiratory: no shortness of breath, no cough and no shortness of breath during exertion  Gastrointestinal: no abdominal pain, no nausea, no vomiting, no constipation and no diarrhea  Integumentary: no rashes and no itching  Neurological: no headache, no numbness, no tingling, no dizziness and no difficulty walking  Psychiatric: no anxiety, no sleep disturbances and no depression  Hematologic/Lymphatic: no tendency for easy bleeding and no tendency for easy bruising  ROS Reviewed:   ROS reviewed  Active Problems    1  Acute pain (338 19) (R52)   2  Brain metastases (198 3) (C79 31)   3  Colitis, acute (558 9) (K52 9)   4  Dye allergic reaction (995 27,E947 4) (T50 995A)   5  High risk medication use (V58 69) (Z79 899)   6  Malignant melanoma (172 9) (C43 9)   7  Metastatic melanoma to lung (197 0,172  9) (C78 00)   8  Non-intractable vomiting with nausea, vomiting of unspecified type    Surgical History    1  History of Appendectomy   2  History of Arthroscopy Knee Left   3  History of Arthroscopy Knee Right   4  History of Back Surgery   5  History of Complete Colonoscopy   6  History of Neuroplasty Decompression Median Nerve At Carpal Tunnel  Surgical History Reviewed: The surgical history was reviewed and updated today  Family History  Mother    1  Family history of hypertension (V17 49) (Z82 49)  Father    2  Family history of CAD (coronary artery disease)  Family History Reviewed: The family history was reviewed and updated today  Social History    ·    · Never a smoker   · No alcohol use  Social History Reviewed: The social history was reviewed and is unchanged  Current Meds   1  Benadryl 25 MG TABS Recorded   2  Benadryl 25 MG TABS; Take 50 mg by  mouth one hour PRIOR to contrast dye;    Therapy: 79Efn8830 to (Last Rx:06Vqx3443) Ordered   3  Levothyroxine Sodium 100 MCG Oral Tablet; TAKE 1 TABLET DAILY; Therapy: (Recorded:76Nqn9429) to Recorded   4  MethylPREDNISolone 32 MG Oral Tablet; Take 32 mg by mouth 12 hours and 2 hours   PRIOR to contrast admiistration; Therapy: 53Rvc0168 to (Last Rx:60Wlk3241) Ordered   5  RaNITidine HCl - 150 MG Oral Tablet; TAKE 1 TABLET EVERY 12 HOURS; To Be Done:   04PBV4416; Status: HOLD FOR - Administration Ordered  Medication List Reviewed: The medication list was reviewed and updated today  Allergies    1  No Known Drug Allergies    2  IVP Dye    Physical Exam    Constitutional   General appearance: No acute distress, well appearing and well nourished  Eyes   Conjunctiva and lids: No swelling, erythema, or discharge  Ears, Nose, Mouth, and Throat   External inspection of ears and nose: Normal     Nasal mucosa, septum, and turbinates: Normal without edema or erythema  Oropharynx: Normal with no erythema, edema, exudate or lesions  Pulmonary   Respiratory effort: No increased work of breathing or signs of respiratory distress  Auscultation of lungs: Clear to auscultation, equal breath sounds bilaterally, no wheezes, no rales, no rhonci  Cardiovascular   Palpation of heart: Normal PMI, no thrills  Auscultation of heart: Normal rate and rhythm, normal S1 and S2, without murmurs  Examination of extremities for edema and/or varicosities: Normal     Abdomen   Abdomen: Non-tender, no masses  Liver and spleen: No hepatomegaly or splenomegaly  Lymphatic   Palpation of lymph nodes in neck: No lymphadenopathy  Musculoskeletal   Gait and station: Normal     Digits and nails: Normal without clubbing or cyanosis  Inspection/palpation of joints, bones, and muscles: Normal     Skin   Skin and subcutaneous tissue: Normal without rashes or lesions  Neurologic   Cranial nerves: Cranial nerves 2-12 intact    Cranial Nerves III, IV, and VI: the extraocular motions were intact  Cranial Nerve V: sensation to the face and masseter strength were intact  Cranial Nerve VII: facial strength was intact bilaterally  Cranial Nerves IX and X: there was normal movement of the soft palate and normal gag  Cranial Nerve XI: shoulder shrug was intact bilaterally  Sensation: No sensory loss  Psychiatric   Orientation to person, place and time: Normal     Mood and affect: Normal         ECOG       Results/Data  (1) MAGNESIUM [id number] 11:29AM [facility] Order Number: [id number]   Order Number: [id number]     Test Name Result Flag Reference   MAGNESIUM 1 8 mg/dL  1 6-2 6   - Patient Instructions: This bloodwork is non-fasting  Please drink two glasses of water morning of bloodwork  - Patient Instructions: This bloodwork is non-fasting  Please drink two glasses of water morning of bloodwork  Signatures   Electronically signed by : Dayan Ledesma [facility]; Nov 9 2016  4:43PM EST                       (Co-author)    Electronically signed by : Dayan Ledesma [facility]; Nov 9 2016  4:54PM EST                       (Co-author)    Electronically signed by : Levonia Landau, M D ; Nov 10 2016  8:27AM EST                       (Author)    Electronically signed by : Dayan Ledesma [facility]; Nov 10 2016  4:32PM EST                       (Co-author)    Electronically signed by :  Levonia Landau, M D ; Nov 11 2016  9:50AM EST                       (Author)

## 2018-01-18 NOTE — PROGRESS NOTES
Assessment    1  Brain metastases (198 3) (C79 31)   2  Malignant melanoma (172 9) (C43 9)   3  Metastatic melanoma to lung (197 0,172  9) (C78 00,C43  9)    Plan  Metastatic melanoma to lung    · Follow-up visit in 1 week Evaluation and Treatment  Follow-up  Status: Hold For -  Scheduling  Requested for: 23SPO1299   Ordered; For: Metastatic melanoma to lung; Ordered By: Divine Acharya Performed:  Due: 89DJT6355    Discussion/Summary  Discussion Summary:   Mr Joanna Hawkins is a 63year-old male who presented to [facility] on 11/9/15 with a cough and right flank pain  The patient had CT scans, which demonstrated what appeared to be diffuse metastatic disease with multiple pulmonary nodules, bulky mediastinal/hilar adenopathy, pleural based nodules, a right renal mass that measured 9 cm, retroperitoneal lymph nodes, and bone metastasis  A core biopsy of the right kidney was biopsied on 11/10/15 and was suspicious for malignant melanoma, HMB45 was positive  The BRAF mutation was tested at that time and was mutated  He did have an MRI of the brain on 11/24/15, which did show tiny punctate brain lesions (see radiology section for more details)  A PET/CT was obtained on 11/27/15 and confirmed diffuse metastatic disease, which was seen on the previous CT imaging  The trial ([id number]) looks at giving Ipilimumab in combination with Nivolumab for patient's who have metastatic melanoma to the brain  We would not treat them with radiation therapy of any kind up front as we are seeing if these drugs cross the blood brain barrier  The patient has had one cycle of treatment, which was given on 1/4/16  He had his second cycle of treatment last week  This week he is feeling much better  His cough and rhinorrhea has improved as well as his overall energy level  He hopes to go back to work this week  He will follow up with us in one week  Yoni Gómez  our clinical nurse coordinator, also saw the patient with me   He will continue to take Celexa  Chief Complaint  Chief Complaint: Chief Complaint:   The patient presents to the office today with Malignant melanoma  Chief Complaint Free Text Note Form: Melanoma      History of Present Illness  Referring Provider: Dr Berenice Jamison   HPI: Mr Haider Arroyo is a 63year-old male who presented to [facility] [place] on 11/9/15 with a cough and right flank pain  The patient had CT scans, which demonstrated what appeared to be diffuse metastatic disease with multiple pulmonary nodules, bulky mediastinal/hilar adenopathy, pleural based nodules, a right renal mass that measured 9 cm, retroperitoneal lymph nodes, and bone metastasis  A core biopsy of the right kidney was biopsied on 11/10/15 and was suspicious for malignant melanoma, HMB45 was positive  The BRAF mutation was tested at that time and was mutated  He did have an MRI of the brain on 11/24/15, which did show tiny punctate brain lesions (see radiology section for more details)  A PET/CT was obtained on 11/27/15 and confirmed diffuse metastatic disease, which was seen on the previous CT imaging  Previous Therapy:   11/10/15 biopsy of the right kidney: high grade malignant neoplasm with melanocytic differentiation  HMB45 positive   Current Therapy: trial ([id number]) Ipilimumab in combination with Nivolumab- first treatment was 1/4/16   Disease Status: Stage IV malignant melanoma   Interval History: Mr Haider Arroyo is a 63year old male who presented to [facility] [place] on 11/9/15 with a persistent cough and right sided flank pain  Unfortunately the patient was found to have diffuse metastatic disease  He had a fairly large right sided kidney mass  A core biopsy of the right kidney was obtained on 11/10/15 and was positive for malignant melanoma  He was also found to have metastatic disease to his brain  Overall the patient is doing well  His energy level has improved over the past week   HE has an ECOG performance status of one  He has a good appetite, which has also improved over the past week  He continues to have mild rhinorrhea and cough, both of these symptoms have improved, however  He did develop a rash last Thursday, it was not itch, and dissipated a day later  He does have occasional diarrhea with no more than two loose stools daily, he is taking Imodium as needed  He denies fevers,chills, CP, SOB, N/V, all other ROS are unremarkable  Tumor Markers: BRAF mutated      Review of Systems  Complete-Male:   Constitutional: fever, recent weight gain, chills, recent ~Ulb weight loss and loss of appetite, but as noted in HPI    The patient presents with complaints of mild feeling tired  Symptoms are made worse by exertion    The patient presents with complaints of bilateral lower back generalized pain, described as dull  bilateral kidney area   Eyes: No complaints of eye pain, no red eyes, no discharge from eyes, no itchy eyes  ENT: sore throat and taste changes, but no complaints of earache, no hearing loss, no nosebleeds, no nasal discharge, no sore throat, no hoarseness  Cardiovascular: No complaints of slow heart rate, no fast heart rate, no chest pain, no palpitations, no leg claudication, no lower extremity  Respiratory: cough and shortness of breath during exertion, but as noted in HPI  Gastrointestinal: vomiting and diarrhea, but as noted in HPI  Genitourinary: No complaints of dysuria, no incontinence, no hesitancy, no nocturia, no genital lesion, no testicular pain  Musculoskeletal: lower back pain, but No complaints of arthralgia, no myalgias, no joint swelling or stiffness, no limb pain or swelling  Integumentary: No complaints of skin rash or skin lesions, no itching, no skin wound, no dry skin  Neurological: numbness and difficulty walking, but No compliants of headache, no confusion, no convulsions, no numbness or tingling, no dizziness or fainting, no limb weakness, no difficulty walking  Psychiatric: Is not suicidal, no sleep disturbances, no anxiety or depression, no change in personality, no emotional problems  Endocrine: muscle weakness, but No complaints of proptosis, no hot flashes, no muscle weakness, no erectile dysfunction, no deepening of the voice, no feelings of weakness  Hematologic/Lymphatic: No complaints of swollen glands, no swollen glands in the neck, does not bleed easily, no easy bruising  Active Problems    1  Acute pain (338 19) (R52)   2  Brain metastases (198 3) (C79 31)   3  Malignant melanoma (172 9) (C43 9)   4  Metastatic melanoma to lung (197 0,172  9) (C78 00,C43  9)    Surgical History    1  History of Appendectomy   2  History of Arthroscopy Knee Left   3  History of Arthroscopy Knee Right   4  History of Back Surgery   5  History of Complete Colonoscopy   6  History of Neuroplasty Decompression Median Nerve At Carpal Tunnel    Family History    1  Family history of hypertension (V17 49) (Z82 49)    2  Family history of CAD (coronary artery disease)    Social History    ·    · Never a smoker   · No alcohol use    Current Meds   1  Citalopram Hydrobromide 10 MG Oral Tablet; TAKE 1 TABLET DAILY; Therapy: 55ZYM2768 to (Jordan Tracy)  Requested for: 37RVH0041; Last   Rx:26Jan2016 Ordered   2  Dexamethasone 0 75 MG Oral Tablet; take as directed; Therapy: 96QLB4587 to (Last Rx:14Jan2016)  Requested for: 30TYT9526 Ordered    Allergies    1  No Known Drug Allergies    Vitals  Vital Signs [Data Includes: Current Encounter]    Recorded: 37FSD8786 09:17AM   Temperature 100 6 F   Heart Rate 126   Respiration 18   Systolic 719   Diastolic 70   Height 6 ft 1 in   Weight 235 lb    BMI Calculated 31   BSA Calculated 2 3   O2 Saturation 97   Pain Scale 0     Physical Exam    Constitutional   General appearance: No acute distress, well appearing and well nourished  Eyes   Conjunctiva and lids: No swelling, erythema, or discharge      Pupils and irises: Equal, round and reactive to light  Ears, Nose, Mouth, and Throat   External inspection of ears and nose: Normal     Nasal mucosa, septum, and turbinates: Normal without edema or erythema  Oropharynx: Normal with no erythema, edema, exudate or lesions  Pulmonary   Respiratory effort: No increased work of breathing or signs of respiratory distress  Auscultation of lungs: Clear to auscultation, equal breath sounds bilaterally, no wheezes, no rales, no rhonci  Cardiovascular   Palpation of heart: Normal PMI, no thrills  Auscultation of heart: Normal rate and rhythm, normal S1 and S2, without murmurs  Examination of extremities for edema and/or varicosities: Normal     Abdomen   Abdomen: Non-tender, no masses  Liver and spleen: No hepatomegaly or splenomegaly  Lymphatic   Palpation of lymph nodes in neck: No lymphadenopathy  Musculoskeletal   Gait and station: Normal     Digits and nails: Normal without clubbing or cyanosis  Inspection/palpation of joints, bones, and muscles: Normal     Skin   Skin and subcutaneous tissue: Normal without rashes or lesions  Neurologic   Cranial nerves: Cranial nerves 2-12 intact  Sensation: No sensory loss  Psychiatric   Orientation to person, place and time: Normal     Mood and affect: Normal          Results/Data  Results Form:   Results   Pathology 11/10/15 right renal mass core biopsy: high grade malignant neoplasm with melanocytic differentiation, HMB45 positive  BRAF mutated  Radiology 11/24/15 MRI of brain: There are two punctate foci of enhancement seen in the left occipital lobe in an area where there is a small focus of abnormal signal on the noncontrast images, suspicious for tiny metastasis  There is at least one additional punctate focus of enhancement in the left medial parietal lobe also suspicious for tiny metastasis   There is an additional area of enhancement in the medial right frontal lobe, does not have a corresponding area of abnormal signal on the pre-gadolinium images and is not definitive for metastasis and may just relate to vessel  Lab Results 12/26/15 labs were reviewed, TSH was low, will re-check TSH  PET 11/27/15 Multiple areas of abnormal increased uptake compatible with diffuse metastatic disease  The following all demonstrate significant increased uptake  The maximum SUV is 11 8  Multiple pulmonary nodules  Multiple mediastinal and right hilar abnormally enlarged and avid nodes  Subpleural masses  Diaphragmatic area mass on the left  Lobulated mass in the right kidney region  Multiple retroperitoneal nodules, which demonstrate mild increased uptake  Bony metastasis  No definite abnormal hepatic activity  Future Appointments    Date/Time Provider Specialty Site   02/08/2016 02:30 PM CARLOS Jara  Hematology [facility]cology [facility] [place]   02/15/2016 11:00 AM CARLOS Jara  Hematology Oncology [facility] [place]     Signatures   Electronically signed by : Luis Chao, [facility]; Feb 1 2016  9:43AM EST                       (Co-author)    Electronically signed by :  CARLOS Orozco ; Feb 1 2016  1:47PM EST                       (Author)

## 2018-01-19 ENCOUNTER — HOSPITAL ENCOUNTER (OUTPATIENT)
Dept: MRI IMAGING | Facility: HOSPITAL | Age: 58
Discharge: HOME | End: 2018-01-19
Payer: COMMERCIAL

## 2018-01-19 ENCOUNTER — HOSPITAL ENCOUNTER (OUTPATIENT)
Dept: CT IMAGING | Facility: HOSPITAL | Age: 58
Discharge: HOME | End: 2018-01-19
Payer: COMMERCIAL

## 2018-01-19 DIAGNOSIS — C43.9 MALIGNANT MELANOMA OF SKIN (HCC): ICD-10-CM

## 2018-01-19 DIAGNOSIS — C79.31 SECONDARY MALIGNANT NEOPLASM OF BRAIN (HCC): ICD-10-CM

## 2018-01-19 DIAGNOSIS — C78.00 SECONDARY MALIGNANT NEOPLASM OF LUNG (HCC): ICD-10-CM

## 2018-01-19 PROCEDURE — A9585 GADOBUTROL INJECTION: HCPCS | Performed by: PHYSICIAN ASSISTANT

## 2018-01-19 PROCEDURE — 70553 MRI BRAIN STEM W/O & W/DYE: CPT

## 2018-01-19 PROCEDURE — 74176 CT ABD & PELVIS W/O CONTRAST: CPT

## 2018-01-19 PROCEDURE — 71250 CT THORAX DX C-: CPT

## 2018-01-19 RX ADMIN — GADOBUTROL 10 ML: 604.72 INJECTION INTRAVENOUS at 15:22

## 2018-01-22 VITALS
OXYGEN SATURATION: 97 % | BODY MASS INDEX: 32.87 KG/M2 | SYSTOLIC BLOOD PRESSURE: 142 MMHG | HEART RATE: 97 BPM | WEIGHT: 248 LBS | RESPIRATION RATE: 16 BRPM | DIASTOLIC BLOOD PRESSURE: 82 MMHG | HEIGHT: 73 IN | TEMPERATURE: 97.5 F

## 2018-01-22 VITALS
SYSTOLIC BLOOD PRESSURE: 158 MMHG | RESPIRATION RATE: 16 BRPM | HEART RATE: 88 BPM | WEIGHT: 244 LBS | TEMPERATURE: 96.1 F | HEIGHT: 74 IN | DIASTOLIC BLOOD PRESSURE: 82 MMHG | BODY MASS INDEX: 31.32 KG/M2 | OXYGEN SATURATION: 98 %

## 2018-01-22 VITALS
OXYGEN SATURATION: 95 % | RESPIRATION RATE: 16 BRPM | SYSTOLIC BLOOD PRESSURE: 130 MMHG | BODY MASS INDEX: 32.27 KG/M2 | HEIGHT: 73 IN | HEART RATE: 102 BPM | DIASTOLIC BLOOD PRESSURE: 78 MMHG | TEMPERATURE: 97.3 F | WEIGHT: 243.5 LBS

## 2018-01-22 VITALS
SYSTOLIC BLOOD PRESSURE: 150 MMHG | DIASTOLIC BLOOD PRESSURE: 88 MMHG | RESPIRATION RATE: 18 BRPM | OXYGEN SATURATION: 95 % | HEIGHT: 73 IN | BODY MASS INDEX: 32.67 KG/M2 | TEMPERATURE: 96.5 F | HEART RATE: 99 BPM | WEIGHT: 246.5 LBS

## 2018-01-22 VITALS
WEIGHT: 239 LBS | DIASTOLIC BLOOD PRESSURE: 90 MMHG | TEMPERATURE: 96 F | BODY MASS INDEX: 30.67 KG/M2 | OXYGEN SATURATION: 98 % | HEIGHT: 74 IN | HEART RATE: 93 BPM | SYSTOLIC BLOOD PRESSURE: 168 MMHG | RESPIRATION RATE: 16 BRPM

## 2018-01-22 VITALS
BODY MASS INDEX: 32.6 KG/M2 | HEART RATE: 99 BPM | RESPIRATION RATE: 16 BRPM | DIASTOLIC BLOOD PRESSURE: 78 MMHG | OXYGEN SATURATION: 97 % | SYSTOLIC BLOOD PRESSURE: 128 MMHG | WEIGHT: 246 LBS | HEIGHT: 73 IN | TEMPERATURE: 98.2 F

## 2018-01-22 VITALS
SYSTOLIC BLOOD PRESSURE: 138 MMHG | DIASTOLIC BLOOD PRESSURE: 76 MMHG | BODY MASS INDEX: 32.74 KG/M2 | WEIGHT: 247 LBS | TEMPERATURE: 97 F | HEART RATE: 100 BPM | HEIGHT: 73 IN | RESPIRATION RATE: 16 BRPM | OXYGEN SATURATION: 97 %

## 2018-01-23 ENCOUNTER — HOSPITAL ENCOUNTER (OUTPATIENT)
Dept: INFUSION CENTER | Facility: CLINIC | Age: 58
Discharge: HOME | End: 2018-01-23
Payer: COMMERCIAL

## 2018-01-23 LAB
ALBUMIN SERPL BCP-MCNC: 3 G/DL (ref 3.5–5)
ALP SERPL-CCNC: 77 U/L (ref 46–116)
ALT SERPL W P-5'-P-CCNC: 40 U/L (ref 12–78)
AMYLASE SERPL-CCNC: 27 IU/L (ref 25–115)
ANION GAP SERPL CALCULATED.3IONS-SCNC: 8 MMOL/L (ref 4–13)
AST SERPL W P-5'-P-CCNC: 23 U/L (ref 5–45)
BASOPHILS # BLD AUTO: 0.04 THOUSANDS/ΜL (ref 0–0.1)
BASOPHILS NFR BLD AUTO: 1 % (ref 0–1)
BILIRUB SERPL-MCNC: 0.2 MG/DL (ref 0.2–1)
BUN SERPL-MCNC: 14 MG/DL (ref 5–25)
CALCIUM SERPL-MCNC: 8.7 MG/DL (ref 8.3–10.1)
CHLORIDE SERPL-SCNC: 107 MMOL/L (ref 100–108)
CO2 SERPL-SCNC: 26 MMOL/L (ref 21–32)
CREAT SERPL-MCNC: 1.21 MG/DL (ref 0.6–1.3)
EOSINOPHIL # BLD AUTO: 0.78 THOUSAND/ΜL (ref 0–0.61)
EOSINOPHIL NFR BLD AUTO: 11 % (ref 0–6)
ERYTHROCYTE [DISTWIDTH] IN BLOOD BY AUTOMATED COUNT: 13.5 % (ref 11.6–15.1)
GFR SERPL CREATININE-BSD FRML MDRD: 66 ML/MIN/1.73SQ M
GLUCOSE SERPL-MCNC: 99 MG/DL (ref 65–140)
HCT VFR BLD AUTO: 32 % (ref 36.5–49.3)
HGB BLD-MCNC: 9.9 G/DL (ref 12–17)
LIPASE SERPL-CCNC: 196 U/L (ref 73–393)
LYMPHOCYTES # BLD AUTO: 1.32 THOUSANDS/ΜL (ref 0.6–4.47)
LYMPHOCYTES NFR BLD AUTO: 19 % (ref 14–44)
MCH RBC QN AUTO: 24.3 PG (ref 26.8–34.3)
MCHC RBC AUTO-ENTMCNC: 30.9 G/DL (ref 31.4–37.4)
MCV RBC AUTO: 79 FL (ref 82–98)
MONOCYTES # BLD AUTO: 0.8 THOUSAND/ΜL (ref 0.17–1.22)
MONOCYTES NFR BLD AUTO: 12 % (ref 4–12)
NEUTROPHILS # BLD AUTO: 3.91 THOUSANDS/ΜL (ref 1.85–7.62)
NEUTS SEG NFR BLD AUTO: 57 % (ref 43–75)
PLATELET # BLD AUTO: 382 THOUSANDS/UL (ref 149–390)
PMV BLD AUTO: 9.6 FL (ref 8.9–12.7)
POTASSIUM SERPL-SCNC: 4.3 MMOL/L (ref 3.5–5.3)
PROT SERPL-MCNC: 6.1 G/DL (ref 6.4–8.2)
RBC # BLD AUTO: 4.07 MILLION/UL (ref 3.88–5.62)
SODIUM SERPL-SCNC: 141 MMOL/L (ref 136–145)
T3FREE SERPL-MCNC: 2.49 PG/ML (ref 2.3–4.2)
T4 FREE SERPL-MCNC: 1.14 NG/DL (ref 0.76–1.46)
TSH SERPL DL<=0.05 MIU/L-ACNC: 1.17 UIU/ML (ref 0.36–3.74)
WBC # BLD AUTO: 6.85 THOUSAND/UL (ref 4.31–10.16)

## 2018-01-23 PROCEDURE — 82150 ASSAY OF AMYLASE: CPT | Performed by: SPECIALIST

## 2018-01-23 PROCEDURE — 84439 ASSAY OF FREE THYROXINE: CPT | Performed by: SPECIALIST

## 2018-01-23 PROCEDURE — 84443 ASSAY THYROID STIM HORMONE: CPT | Performed by: SPECIALIST

## 2018-01-23 PROCEDURE — 85025 COMPLETE CBC W/AUTO DIFF WBC: CPT | Performed by: SPECIALIST

## 2018-01-23 PROCEDURE — 83690 ASSAY OF LIPASE: CPT | Performed by: SPECIALIST

## 2018-01-23 PROCEDURE — 80053 COMPREHEN METABOLIC PANEL: CPT | Performed by: SPECIALIST

## 2018-01-23 PROCEDURE — 84481 FREE ASSAY (FT-3): CPT | Performed by: SPECIALIST

## 2018-01-23 RX ADMIN — Medication 300 UNITS: at 08:50

## 2018-01-24 VITALS
TEMPERATURE: 96.1 F | HEART RATE: 101 BPM | WEIGHT: 241 LBS | DIASTOLIC BLOOD PRESSURE: 78 MMHG | BODY MASS INDEX: 31.94 KG/M2 | SYSTOLIC BLOOD PRESSURE: 128 MMHG | HEIGHT: 73 IN | OXYGEN SATURATION: 99 % | RESPIRATION RATE: 16 BRPM

## 2018-01-29 DIAGNOSIS — C43.9 MALIGNANT MELANOMA, UNSPECIFIED SITE (HCC): Primary | ICD-10-CM

## 2018-01-31 DIAGNOSIS — C43.9 MALIGNANT MELANOMA, UNSPECIFIED SITE (HCC): Primary | ICD-10-CM

## 2018-04-13 ENCOUNTER — HOSPITAL ENCOUNTER (OUTPATIENT)
Dept: CT IMAGING | Facility: HOSPITAL | Age: 58
Discharge: HOME | End: 2018-04-13
Payer: COMMERCIAL

## 2018-04-13 ENCOUNTER — HOSPITAL ENCOUNTER (OUTPATIENT)
Dept: MRI IMAGING | Facility: HOSPITAL | Age: 58
Discharge: HOME | End: 2018-04-13
Payer: COMMERCIAL

## 2018-04-13 DIAGNOSIS — C43.9 MALIGNANT MELANOMA, UNSPECIFIED SITE (HCC): ICD-10-CM

## 2018-04-13 PROCEDURE — 74176 CT ABD & PELVIS W/O CONTRAST: CPT

## 2018-04-13 PROCEDURE — 71250 CT THORAX DX C-: CPT

## 2018-04-13 PROCEDURE — A9577 INJ MULTIHANCE: HCPCS | Performed by: PHYSICIAN ASSISTANT

## 2018-04-13 PROCEDURE — 70553 MRI BRAIN STEM W/O & W/DYE: CPT

## 2018-04-13 RX ADMIN — GADOBENATE DIMEGLUMINE 21 ML: 529 INJECTION, SOLUTION INTRAVENOUS at 11:00

## 2018-04-17 NOTE — PROGRESS NOTES
Hematology/Oncology Outpatient Follow- up Note  Rupa Adame 61 y o  male MRN: @ Encounter: [id number]        Date:  4/17/2018        Assessment / Plan:    1  Metastatic melanoma to the brain, liver, and lung  The patient participated on the [id number] clinical trial where he receive Ipilimumab and Nivolumab combination followed by monotherapy with Nivolumab  He completed 39 cycles of Nivolumab on 12/27/17  Overall the patient is doing well  He had an MRI of the brain and CT scans on 4/13/18, which continues to show stable disease  His labs were reviewed  He was also seen by Raghu Thurman, our clinical trials nurse today as well  His labs were reviewed  We will see him back in three months with repeat imaging and lab work  Subjective:   CC: follow up metastatic melanoma      HPI:    Mr Muna Cisneros is a 61year-old male who presented to [facility] AT [place] on 11/9/15 with a cough and right flank pain  The patient had CT scans, which demonstrated what appeared to be diffuse metastatic disease with multiple pulmonary nodules, bulky mediastinal/hilar adenopathy, pleural based nodules, a right renal mass that measured 9 cm, retroperitoneal lymph nodes, and bone metastasis  A core biopsy of the right kidney was biopsied on 11/10/15 and was suspicious for malignant melanoma, HMB45 was positive  The BRAF mutation was tested at that time and was mutated  He did have an MRI of the brain on 11/24/15, which did show tiny punctate brain lesions (see radiology section for more details)  A PET/CT was obtained on 11/27/15 and confirmed diffuse metastatic disease, which was seen on the previous CT imaging  He had a fairly large right sided kidney mass  A core biopsy of the right kidney was obtained on 11/10/15 and was positive for malignant melanoma  He was also found to have metastatic disease to his brain  He completed three doses of Nivolumab/Ipilimumab on 2/23/16, he is s/p 3 doses   He did develop severe colitis and he was stared on high dose Prednisone on 2/23/16, he will start Nivolumab 3 mg/kg every 2 weeks starting on 4/25/16  He completed two years of treatment on 12/27/17  Interval History:    Overall Mr Helder Al is doing well  He has a good energy level with an ECOG performance status of zero  He has a good appetite and his weight has been stable  He is actually trying to lose weight  He has had a lot of sinus congestion issues, which he has always had  He has had more frequent headaches secondary to his allergies, the MRI of the brain was negative and he is not having any other neurological issues  He continues to have stable dysgeusia and xerostomia  His hearing has been stable and he still does not have a sense of smell  He denies fevers, chills, CP, SOB, N/V, diarrhea, all other ROS are unremarkable  PFSH was reviewed  Cancer Staging: metastatic melanoma to the brain, lung, liver, and lymph nodes    BRAF: mutated    Previous Hematologic/ Oncologic History:    -11/10/15 biopsy of the right kidney: high grade malignant neoplasm with melanocytic differentiation  HMB45 positive    -trial ([id number]) Ipilimumab in combination with Nivolumab- first treatment was 1/4/16 with continuation of single agent Nivolumab since 4/25/2016, 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, re-started treatment on 3/30/17, completed two years of Nivolumab on 12/27/17  Current Hematologic/ Oncologic Treatment:    observation          Test Results:    Imaging: Ct Chest Abdomen Pelvis Wo Contrast    Result Date: 4/13/2018  Narrative: CT CHEST, ABDOMEN AND PELVIS WITHOUT IV CONTRAST INDICATION:   C43 9: Malignant melanoma of skin, unspecified  Restaging examination for subsequent treatment strategy   COMPARISON: 1/19/2018 and priors TECHNIQUE: CT examination of the chest, abdomen and pelvis was performed without intravenous contrast   Axial, sagittal, and coronal 2D reformatted images were created from the source data and submitted for interpretation  Radiation dose length product (DLP) for this visit:  870 mGy-cm   This examination, like all CT scans performed in the [facility], was performed utilizing techniques to minimize radiation dose exposure, including the use of iterative reconstruction and automated exposure control  Enteric contrast was administered  FINDINGS: RECIST Interpretation: Target lesions: (F) 1  Left upper lung nodule 4/18 is stable, measuring 5 x 4 mm  Prior measurement 5 x 4 mm  (G) 2   Subcarinal lymph node 2/29 is stable, measuring 10 x 6 mm  Prior measurement 10 x 6 mm  (H) 3  Right retroperitoneal lower pole renal capsule lesion 2/74 is stable, measuring 7 x 6 mm  Prior measurement 7 x 6 mm  (I) 4  Left retroperitoneal lesion not measurable  Prior not measurable  Nontarget lesions: (K) 1   Stable pulmonary metastases  (L) 2  Right hepatic dome lesion difficult to visualize on this noncontrast study  (M) 3   Stable sclerotic osseous metastases  (N) 4  Hypervascular hepatic dome lesion not well evaluated on this noncontrast study  CHEST LUNGS:  Stable 5 x 4 mm left upper lung nodule  Stable nodularity along the left major fissure  No new lung nodules  There is no tracheal or endobronchial lesion  PLEURA:  Unremarkable  HEART/GREAT VESSELS:  Unremarkable for patient's age  MEDIASTINUM AND BECKY:  Unremarkable  CHEST WALL AND LOWER NECK:   Unremarkable  ABDOMEN LIVER/BILIARY TREE:  Stable  Limited evaluation without intravenous contrast  GALLBLADDER:  No calcified gallstones  No pericholecystic inflammatory change  SPLEEN:  Unremarkable  PANCREAS:  Unremarkable  ADRENAL GLANDS:  Unremarkable  KIDNEYS/URETERS:  Stable left hydroureter  Stable right renal cyst measuring 3 3 x 3 5 cm  Smaller upper pole cyst   No right hydronephrosis  STOMACH AND BOWEL:  Mild wall thickening versus underdistention of the hepatic flexure and rectosigmoid colon  No bowel obstruction  APPENDIX:  No findings to suggest appendicitis  ABDOMINOPELVIC CAVITY:  No ascites or free intraperitoneal air  No lymphadenopathy  VESSELS:  Unremarkable for patient's age  PELVIS REPRODUCTIVE ORGANS:  Unremarkable for patient's age  URINARY BLADDER:  Unremarkable  ABDOMINAL WALL/INGUINAL REGIONS:  Unremarkable  OSSEOUS STRUCTURES:  No acute fracture or destructive osseous lesion  Stable chronic T12 compression fracture  L5 spondylolysis  Impression: 1  Mild wall thickening versus underdistention of the hepatic flexure and rectosigmoid colon  Correlate clinically to exclude a mild colitis  2   Otherwise stable exam   No new metastases visualized  Workstation performed: [id number]     Mri Brain W Wo Contrast    Result Date: 4/13/2018  Narrative: MRI BRAIN WITH AND WITHOUT CONTRAST INDICATION:  Stage IV melanoma  COMPARISON:  1/19/2018 TECHNIQUE: Sagittal T1, axial T2, axial FLAIR, axial T1, axial [place], axial diffusion  Axial bravo precontrast  Sagittal, axial and coronal T1 postcontrast   Axial BRAVO post contrast   IV Contrast:  21 mL of gadobenate dimeglumine (MULTIHANCE)  IMAGE QUALITY:   Diagnostic  FINDINGS: BRAIN PARENCHYMA:  There is no discrete mass, mass effect or midline shift  No abnormal white matter signal identified  Brainstem and cerebellum demonstrate normal signal  There is no intracranial hemorrhage  There is no evidence of acute infarction and  diffusion imaging is unremarkable  Postcontrast imaging of the brain demonstrates no abnormal enhancement  VENTRICLES:  Normal  SELLA AND PITUITARY GLAND:  Normal  ORBITS:  Normal  PARANASAL SINUSES:  Extensive diffuse sinus disease again identified  Extensive mucosal thickening in addition to opacification throughout the sinuses  There is fluid layering in the left maxillary sinus  Findings are similar to prior examination  VASCULATURE:  Evaluation of the major intracranial vasculature demonstrates appropriate flow voids   CALVARIUM AND SKULL BASE:  Normal  EXTRACRANIAL SOFT TISSUES:  Normal      Impression: No acute intracranial pathology  No enhancement to suggest metastatic disease  Extensive sinus disease again identified with near complete opacification  Workstation performed: [id number]       Labs:   Lab Results   Component Value Date    WBC 6 85 01/23/2018    HGB 9 9 (L) 01/23/2018    HCT 32 0 (L) 01/23/2018    MCV 79 (L) 01/23/2018     01/23/2018     Lab Results   Component Value Date     01/23/2018    K 4 3 01/23/2018     01/23/2018    CO2 26 01/23/2018    ANIONGAP 8 01/23/2018    BUN 14 01/23/2018    CREATININE 1 21 01/23/2018    GLUCOSE 99 01/23/2018    CALCIUM 8 7 01/23/2018    AST 23 01/23/2018    ALT 40 01/23/2018    ALKPHOS 77 01/23/2018    PROT 6 1 (L) 01/23/2018    BILITOT 0 20 01/23/2018    EGFR 66 01/23/2018         No results found for: SPEP, UPEP    No results found for: PSA    No results found for: CEA    No results found for:     No results found for: AFP    No results found for: IRON, TIBC, FERRITIN    No results found for: [id number]      ROS: Review of Systems   HENT:        No taste, dryness of the mouth, hearing loss- all stable         Current Medications: Reviewed  Allergies: Reviewed  PMH/FH/SH:  Reviewed      Physical Exam:    There is no height or weight on file to calculate BSA  Wt Readings from Last 3 Encounters:   04/13/18 107 kg (235 lb)   12/27/17 110 kg (241 lb 6 5 oz)   12/27/17 109 kg (241 lb)        Temp Readings from Last 3 Encounters:   12/27/17 98 3 °F (36 8 °C) (Oral)   12/27/17 (!) 96 1 °F (35 6 °C)   12/11/17 (!) 97 4 °F (36 3 °C) (Oral)        BP Readings from Last 3 Encounters:   12/27/17 128/80   12/27/17 128/78   12/11/17 150/70         Pulse Readings from Last 3 Encounters:   12/27/17 95   12/27/17 101   12/11/17 93     @LASTSAO2(3)@      Physical Exam   Constitutional: He is oriented to person, place, and time  He appears well-developed and well-nourished     HENT:   Head: Normocephalic and atraumatic  Nose: Nose normal    Mouth/Throat: Oropharynx is clear and moist    Eyes: Conjunctivae and EOM are normal  Pupils are equal, round, and reactive to light  Neck: Normal range of motion  Neck supple  Cardiovascular: Normal rate, regular rhythm and normal heart sounds  Pulmonary/Chest: Effort normal and breath sounds normal    Abdominal: Soft  Bowel sounds are normal    Musculoskeletal: Normal range of motion  Neurological: He is alert and oriented to person, place, and time  He has normal reflexes  Skin: Skin is warm and dry  Psychiatric: He has a normal mood and affect  Judgment normal    Vitals reviewed  Goals and Barriers:  Current Goal:  Prolong Survival from Cancer  Barriers: None  Patient's Capacity to Self Care:  Patient fully able to self care      Code Status: [unfilled]  Advance Directive and Living Will:      Power of :

## 2018-04-18 ENCOUNTER — OFFICE VISIT (OUTPATIENT)
Dept: HEMATOLOGY ONCOLOGY | Facility: CLINIC | Age: 58
End: 2018-04-18
Payer: COMMERCIAL

## 2018-04-18 VITALS
BODY MASS INDEX: 30.03 KG/M2 | DIASTOLIC BLOOD PRESSURE: 90 MMHG | TEMPERATURE: 97.4 F | OXYGEN SATURATION: 97 % | WEIGHT: 234 LBS | HEIGHT: 74 IN | SYSTOLIC BLOOD PRESSURE: 150 MMHG | RESPIRATION RATE: 16 BRPM | HEART RATE: 80 BPM

## 2018-04-18 DIAGNOSIS — C79.9 METASTATIC MELANOMA (HCC): ICD-10-CM

## 2018-04-18 DIAGNOSIS — C79.31 MALIGNANT MELANOMA METASTATIC TO BRAIN (HCC): Primary | ICD-10-CM

## 2018-04-18 DIAGNOSIS — C43.9 MALIGNANT MELANOMA, UNSPECIFIED SITE (HCC): Primary | ICD-10-CM

## 2018-04-18 PROCEDURE — 99215 OFFICE O/P EST HI 40 MIN: CPT | Performed by: SPECIALIST

## 2018-04-18 NOTE — LETTER
April 18, 2018     Kenton Bull, [address] [id number]    Patient: Skeeter Claude   YOB: 1960   Date of Visit: 4/18/2018       Dear Dr Mackenzie Boone: Thank you for referring Skeeter Claude to me for evaluation  Below are my notes for this consultation  If you have questions, please do not hesitate to call me  I look forward to following your patient along with you  Sincerely,        Taylor Ang MD        CC: No Recipients  Chloe Anderson PA-C  4/18/2018 12:05 PM  Sign at close encounter  Hematology/Oncology Outpatient Follow- up Note  Skeeter Claude 61 y o  male MRN: @ Encounter: [id number]        Date:  4/17/2018        Assessment / Plan:    1  Metastatic melanoma to the brain, liver, and lung  The patient participated on the [id number] clinical trial where he receive Ipilimumab and Nivolumab combination followed by monotherapy with Nivolumab  He completed 39 cycles of Nivolumab on 12/27/17  Overall the patient is doing well  He had an MRI of the brain and CT scans on 4/13/18, which continues to show stable disease  His labs were reviewed  He was also seen by Beverly Hernandez, our clinical trials nurse today as well  His labs were reviewed  We will see him back in three months with repeat imaging and lab work  Subjective:   CC: follow up metastatic melanoma      HPI:    Mr Lorena Graff is a 61year-old male who presented to [facility] AT [place] on 11/9/15 with a cough and right flank pain  The patient had CT scans, which demonstrated what appeared to be diffuse metastatic disease with multiple pulmonary nodules, bulky mediastinal/hilar adenopathy, pleural based nodules, a right renal mass that measured 9 cm, retroperitoneal lymph nodes, and bone metastasis  A core biopsy of the right kidney was biopsied on 11/10/15 and was suspicious for malignant melanoma, HMB45 was positive  The BRAF mutation was tested at that time and was mutated   He did have an MRI of the brain on 11/24/15, which did show tiny punctate brain lesions (see radiology section for more details)  A PET/CT was obtained on 11/27/15 and confirmed diffuse metastatic disease, which was seen on the previous CT imaging  He had a fairly large right sided kidney mass  A core biopsy of the right kidney was obtained on 11/10/15 and was positive for malignant melanoma  He was also found to have metastatic disease to his brain  He completed three doses of Nivolumab/Ipilimumab on 2/23/16, he is s/p 3 doses  He did develop severe colitis and he was stared on high dose Prednisone on 2/23/16, he will start Nivolumab 3 mg/kg every 2 weeks starting on 4/25/16  He completed two years of treatment on 12/27/17  Interval History:    Overall Mr Alea Crawford is doing well  He has a good energy level with an ECOG performance status of zero  He has a good appetite and his weight has been stable  He is actually trying to lose weight  He has had a lot of sinus congestion issues, which he has always had  He has had more frequent headaches secondary to his allergies, the MRI of the brain was negative and he is not having any other neurological issues  He continues to have stable dysgeusia and xerostomia  His hearing has been stable and he still does not have a sense of smell  He denies fevers, chills, CP, SOB, N/V, diarrhea, all other ROS are unremarkable  PFSH was reviewed  Cancer Staging: metastatic melanoma to the brain, lung, liver, and lymph nodes    BRAF: mutated    Previous Hematologic/ Oncologic History:    -11/10/15 biopsy of the right kidney: high grade malignant neoplasm with melanocytic differentiation   HMB45 positive    -trial ([id number]) Ipilimumab in combination with Nivolumab- first treatment was 1/4/16 with continuation of single agent Nivolumab since 4/25/2016, 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, re-started treatment on 3/30/17, completed two years of Nivolumab on 12/27/17  Current Hematologic/ Oncologic Treatment:    observation          Test Results:    Imaging: Ct Chest Abdomen Pelvis Wo Contrast    Result Date: 4/13/2018  Narrative: CT CHEST, ABDOMEN AND PELVIS WITHOUT IV CONTRAST INDICATION:   C43 9: Malignant melanoma of skin, unspecified  Restaging examination for subsequent treatment strategy  COMPARISON: 1/19/2018 and priors TECHNIQUE: CT examination of the chest, abdomen and pelvis was performed without intravenous contrast   Axial, sagittal, and coronal 2D reformatted images were created from the source data and submitted for interpretation  Radiation dose length product (DLP) for this visit:  870 mGy-cm   This examination, like all CT scans performed in the [facility], was performed utilizing techniques to minimize radiation dose exposure, including the use of iterative reconstruction and automated exposure control  Enteric contrast was administered  FINDINGS: RECIST Interpretation: Target lesions: (F) 1  Left upper lung nodule 4/18 is stable, measuring 5 x 4 mm  Prior measurement 5 x 4 mm  (G) 2   Subcarinal lymph node 2/29 is stable, measuring 10 x 6 mm  Prior measurement 10 x 6 mm  (H) 3  Right retroperitoneal lower pole renal capsule lesion 2/74 is stable, measuring 7 x 6 mm  Prior measurement 7 x 6 mm  (I) 4  Left retroperitoneal lesion not measurable  Prior not measurable  Nontarget lesions: (K) 1   Stable pulmonary metastases  (L) 2  Right hepatic dome lesion difficult to visualize on this noncontrast study  (M) 3   Stable sclerotic osseous metastases  (N) 4  Hypervascular hepatic dome lesion not well evaluated on this noncontrast study  CHEST LUNGS:  Stable 5 x 4 mm left upper lung nodule  Stable nodularity along the left major fissure  No new lung nodules  There is no tracheal or endobronchial lesion  PLEURA:  Unremarkable  HEART/GREAT VESSELS:  Unremarkable for patient's age   MEDIASTINUM AND BECKY: Unremarkable  CHEST WALL AND LOWER NECK:   Unremarkable  ABDOMEN LIVER/BILIARY TREE:  Stable  Limited evaluation without intravenous contrast  GALLBLADDER:  No calcified gallstones  No pericholecystic inflammatory change  SPLEEN:  Unremarkable  PANCREAS:  Unremarkable  ADRENAL GLANDS:  Unremarkable  KIDNEYS/URETERS:  Stable left hydroureter  Stable right renal cyst measuring 3 3 x 3 5 cm  Smaller upper pole cyst   No right hydronephrosis  STOMACH AND BOWEL:  Mild wall thickening versus underdistention of the hepatic flexure and rectosigmoid colon  No bowel obstruction  APPENDIX:  No findings to suggest appendicitis  ABDOMINOPELVIC CAVITY:  No ascites or free intraperitoneal air  No lymphadenopathy  VESSELS:  Unremarkable for patient's age  PELVIS REPRODUCTIVE ORGANS:  Unremarkable for patient's age  URINARY BLADDER:  Unremarkable  ABDOMINAL WALL/INGUINAL REGIONS:  Unremarkable  OSSEOUS STRUCTURES:  No acute fracture or destructive osseous lesion  Stable chronic T12 compression fracture  L5 spondylolysis  Impression: 1  Mild wall thickening versus underdistention of the hepatic flexure and rectosigmoid colon  Correlate clinically to exclude a mild colitis  2   Otherwise stable exam   No new metastases visualized  Workstation performed: [id number]     Mri Brain W Wo Contrast    Result Date: 4/13/2018  Narrative: MRI BRAIN WITH AND WITHOUT CONTRAST INDICATION:  Stage IV melanoma  COMPARISON:  1/19/2018 TECHNIQUE: Sagittal T1, axial T2, axial FLAIR, axial T1, axial [place], axial diffusion  Axial bravo precontrast  Sagittal, axial and coronal T1 postcontrast   Axial BRAVO post contrast   IV Contrast:  21 mL of gadobenate dimeglumine (MULTIHANCE)  IMAGE QUALITY:   Diagnostic  FINDINGS: BRAIN PARENCHYMA:  There is no discrete mass, mass effect or midline shift  No abnormal white matter signal identified  Brainstem and cerebellum demonstrate normal signal  There is no intracranial hemorrhage    There is no evidence of acute infarction and  diffusion imaging is unremarkable  Postcontrast imaging of the brain demonstrates no abnormal enhancement  VENTRICLES:  Normal  SELLA AND PITUITARY GLAND:  Normal  ORBITS:  Normal  PARANASAL SINUSES:  Extensive diffuse sinus disease again identified  Extensive mucosal thickening in addition to opacification throughout the sinuses  There is fluid layering in the left maxillary sinus  Findings are similar to prior examination  VASCULATURE:  Evaluation of the major intracranial vasculature demonstrates appropriate flow voids  CALVARIUM AND SKULL BASE:  Normal  EXTRACRANIAL SOFT TISSUES:  Normal      Impression: No acute intracranial pathology  No enhancement to suggest metastatic disease  Extensive sinus disease again identified with near complete opacification  Workstation performed: [id number]       Labs:   Lab Results   Component Value Date    WBC 6 85 01/23/2018    HGB 9 9 (L) 01/23/2018    HCT 32 0 (L) 01/23/2018    MCV 79 (L) 01/23/2018     01/23/2018     Lab Results   Component Value Date     01/23/2018    K 4 3 01/23/2018     01/23/2018    CO2 26 01/23/2018    ANIONGAP 8 01/23/2018    BUN 14 01/23/2018    CREATININE 1 21 01/23/2018    GLUCOSE 99 01/23/2018    CALCIUM 8 7 01/23/2018    AST 23 01/23/2018    ALT 40 01/23/2018    ALKPHOS 77 01/23/2018    PROT 6 1 (L) 01/23/2018    BILITOT 0 20 01/23/2018    EGFR 66 01/23/2018         No results found for: SPEP, UPEP    No results found for: PSA    No results found for: CEA    No results found for:     No results found for: AFP    No results found for: IRON, TIBC, FERRITIN    No results found for: [id number]      ROS: Review of Systems   HENT:        No taste, dryness of the mouth, hearing loss- all stable         Current Medications: Reviewed  Allergies: Reviewed  PMH/FH/SH:  Reviewed      Physical Exam:    There is no height or weight on file to calculate BSA      Wt Readings from Last 3 Encounters: 04/13/18 107 kg (235 lb)   12/27/17 110 kg (241 lb 6 5 oz)   12/27/17 109 kg (241 lb)        Temp Readings from Last 3 Encounters:   12/27/17 98 3 °F (36 8 °C) (Oral)   12/27/17 (!) 96 1 °F (35 6 °C)   12/11/17 (!) 97 4 °F (36 3 °C) (Oral)        BP Readings from Last 3 Encounters:   12/27/17 128/80   12/27/17 128/78   12/11/17 150/70         Pulse Readings from Last 3 Encounters:   12/27/17 95   12/27/17 101   12/11/17 93     @LASTSAO2(3)@      Physical Exam   Constitutional: He is oriented to person, place, and time  He appears well-developed and well-nourished  HENT:   Head: Normocephalic and atraumatic  Nose: Nose normal    Mouth/Throat: Oropharynx is clear and moist    Eyes: Conjunctivae and EOM are normal  Pupils are equal, round, and reactive to light  Neck: Normal range of motion  Neck supple  Cardiovascular: Normal rate, regular rhythm and normal heart sounds  Pulmonary/Chest: Effort normal and breath sounds normal    Abdominal: Soft  Bowel sounds are normal    Musculoskeletal: Normal range of motion  Neurological: He is alert and oriented to person, place, and time  He has normal reflexes  Skin: Skin is warm and dry  Psychiatric: He has a normal mood and affect  Judgment normal    Vitals reviewed  Goals and Barriers:  Current Goal:  Prolong Survival from Cancer  Barriers: None  Patient's Capacity to Self Care:  Patient fully able to self care      Code Status: [unfilled]  Advance Directive and Living Will:      Power of :

## 2018-07-10 DIAGNOSIS — C43.9 MALIGNANT MELANOMA, UNSPECIFIED SITE (HCC): Primary | ICD-10-CM

## 2018-07-20 ENCOUNTER — HOSPITAL ENCOUNTER (OUTPATIENT)
Dept: MRI IMAGING | Facility: HOSPITAL | Age: 58
Discharge: HOME | End: 2018-07-20
Payer: COMMERCIAL

## 2018-07-20 ENCOUNTER — APPOINTMENT (OUTPATIENT)
Dept: CT IMAGING | Facility: HOSPITAL | Age: 58
End: 2018-07-20
Payer: COMMERCIAL

## 2018-07-20 ENCOUNTER — HOSPITAL ENCOUNTER (OUTPATIENT)
Dept: CT IMAGING | Facility: HOSPITAL | Age: 58
Discharge: HOME | End: 2018-07-20
Payer: COMMERCIAL

## 2018-07-20 DIAGNOSIS — C79.31 MALIGNANT MELANOMA METASTATIC TO BRAIN (HCC): ICD-10-CM

## 2018-07-20 PROCEDURE — 74176 CT ABD & PELVIS W/O CONTRAST: CPT

## 2018-07-20 PROCEDURE — A9585 GADOBUTROL INJECTION: HCPCS | Performed by: PHYSICIAN ASSISTANT

## 2018-07-20 PROCEDURE — 70553 MRI BRAIN STEM W/O & W/DYE: CPT

## 2018-07-20 PROCEDURE — 71250 CT THORAX DX C-: CPT

## 2018-07-20 RX ADMIN — GADOBUTROL 10 ML: 604.72 INJECTION INTRAVENOUS at 14:00

## 2018-07-24 PROBLEM — C79.9 METASTATIC MELANOMA (HCC): Status: ACTIVE | Noted: 2018-07-24

## 2018-07-24 PROBLEM — C79.31 MALIGNANT MELANOMA METASTATIC TO BRAIN (HCC): Status: ACTIVE | Noted: 2018-07-24

## 2018-07-24 PROBLEM — C43.9 METASTATIC MELANOMA (HCC): Status: ACTIVE | Noted: 2018-07-24

## 2018-07-24 NOTE — PROGRESS NOTES
Hematology/Oncology Outpatient Follow- up Note  Vilma Mckeon 61 y o  male MRN: @ Encounter: [id number]        Date:  7/24/2018        Assessment / Plan:    1  Metastatic melanoma to the brain, liver, and lung  The patient participated on the [id number] clinical trial where he receive Ipilimumab and Nivolumab combination followed by monotherapy with Nivolumab  He completed 39 cycles of Nivolumab on 12/27/17  Overall the patient is doing well  He had an MRI of the brain and CT scans on 7/20/18, which continues to show stable disease  His labs were reviewed  He was also seen by Abdoulaye Rangel, our clinical trials nurse today as well  His labs were reviewed  We will see him back in three months with repeat imaging and lab work  Subjective:   CC: follow up regarding metastatic melanoma      HPI:    Mr Buzz Terry is a 61year-old male who presented to [facility] [place] on 11/9/15 with a cough and right flank pain  The patient had CT scans, which demonstrated what appeared to be diffuse metastatic disease with multiple pulmonary nodules, bulky mediastinal/hilar adenopathy, pleural based nodules, a right renal mass that measured 9 cm, retroperitoneal lymph nodes, and bone metastasis  A core biopsy of the right kidney was biopsied on 11/10/15 and was suspicious for malignant melanoma, HMB45 was positive  The BRAF mutation was tested at that time and was mutated  He did have an MRI of the brain on 11/24/15, which did show tiny punctate brain lesions (see radiology section for more details)  A PET/CT was obtained on 11/27/15 and confirmed diffuse metastatic disease, which was seen on the previous CT imaging  He had a fairly large right sided kidney mass  A core biopsy of the right kidney was obtained on 11/10/15 and was positive for malignant melanoma  He was also found to have metastatic disease to his brain  He completed three doses of Nivolumab/Ipilimumab on 2/23/16, he is s/p 3 doses   He did develop severe colitis and he was stared on high dose Prednisone on 2/23/16, he will start Nivolumab 3 mg/kg every 2 weeks starting on 4/25/16  Nathalia Zamudio completed two years of treatment on 12/27/17  Interval History:    Overall the patient does well  He has a good energy level with an ECOG performance status of zero  He has a good appetite and his weight has been stable  He continues to have stable xerostomia  He also continues to have dysgeusia and no sense of smell  He denies fevers, chills ,CP, SOB, N/V, diarrhea, all other ROS are unremarkable  PFSH was reviewed  Cancer Staging:   metastatic melanoma to the brain, lung, liver, and lymph nodes    BRAF: mutated    Previous Hematologic/ Oncologic History:    -11/10/15 biopsy of the right kidney: high grade malignant neoplasm with melanocytic differentiation  HMB45 positive    -trial ([id number]) Ipilimumab in combination with Nivolumab- first treatment was 1/4/16 with continuation of single agent Nivolumab since 4/25/2016, 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, re-started treatment on 3/30/17, completed two years of Nivolumab on 12/27/17    Current Hematologic/ Oncologic Treatment:    observation          Test Results:    Imaging: Ct Chest Abdomen Pelvis Wo Contrast    Result Date: 7/20/2018  Narrative: CT CHEST, ABDOMEN AND PELVIS WITHOUT IV CONTRAST INDICATION: Malignant melanoma  C79 31: Secondary malignant neoplasm of brain  COMPARISON:  None  TECHNIQUE: CT examination of the chest, abdomen and pelvis was performed without intravenous contrast   Axial, sagittal, and coronal 2D reformatted images were created from the source data and submitted for interpretation  Radiation dose length product (DLP) for this visit:  1067 mGy-cm     This examination, like all CT scans performed in the [facility], was performed utilizing techniques to minimize radiation dose exposure, including the use of iterative reconstruction and automated exposure control  Enteric contrast was administered  FINDINGS: RECIST INTERPRETATION: TARGET LESIONS: (F) 1  Left upper lung nodule: 5 x 4 mm image 4/19  Previously 5 x 4 mm image 4/18  (G) 2   Subcarinal lymph node: 10 x 6 mm image 2/29  Previously 10 x 6 mm image 2/29  (H) 3  Right retroperitoneal lower pole renal capsule lesion: 7 x 6 mm image 2/73  Previously 7 x 6 mm image 2/74  (I) 4  Left retroperitoneal lesion: Not measurable  Previously not measurable  NONTARGET LESIONS: (K) 1   Stable pulmonary nodules/metastases  (L) 2  Right hepatic dome lesion: Not well visualized without IV contrast   Grossly stable from April 13  (M) 3   Stable sclerotic osseous metastases  (N) 4  Previously Hypervascular hepatic dome lesion not well evaluated on this noncontrast study  CHEST LUNGS:  Stable pulmonary nodules  PLEURA:  No pleural effusion  HEART/GREAT VESSELS:  Cardiac size within normal limits  No pericardial effusion  Atherosclerotic aorta  MEDIASTINUM AND BECKY:  Right chest wall port with catheter tip in the right atrium  CHEST WALL AND LOWER NECK:   No chest wall lesions  No axillary adenopathy  ABDOMEN LIVER/BILIARY TREE:  Ill-defined low-attenuation lesion in the posterior right hepatic lobe measuring approximately 9 x 10 mm, previously 9 x 10 cm  GALLBLADDER:  No calcified gallstones  No pericholecystic inflammatory change  SPLEEN:  Unremarkable  PANCREAS:  Unremarkable  ADRENAL GLANDS:  Unremarkable  KIDNEYS/URETERS: Right kidney: Stable renal cystic lesions  No hydronephrosis  Stable scarring lower pole extending into the perirenal fat to Gerota's fascia Left kidney: Stable hydroureter  No obstructing lesion  STOMACH AND BOWEL:  Mild rectal wall thickening, stable from prior exam APPENDIX:  Removed ABDOMINOPELVIC CAVITY:  No ascites or free intraperitoneal air  No lymphadenopathy  VESSELS:  Unremarkable for patient's age  PELVIS REPRODUCTIVE ORGANS:  Unremarkable for patient's age   URINARY BLADDER:  Mild bladder wall trabeculation  ABDOMINAL WALL/INGUINAL REGIONS:  Tiny fat-containing umbilical hernia  Tiny fat-containing left inguinal hernia  OSSEOUS STRUCTURES:  Chronic T12 compression fracture is stable  L5 spondylolysis is also stable  Stable presumed subtle sclerotic metastases, for example at L3 and L5  Stable postop changes lower cervical spine  Stable postop changes right shoulder  Impression: Stable exam  No new metastatic lesions are seen Workstation performed: [id number]     Mri Brain W Wo Contrast    Result Date: 7/20/2018  Narrative: MRI BRAIN WITH AND WITHOUT CONTRAST INDICATION: C79 31: Secondary malignant neoplasm of brain COMPARISON:  4/13/2018  TECHNIQUE: Sagittal T1, axial T2, axial FLAIR, axial T1, axial [place], axial diffusion  Axial bravo precontrast  Axial and coronal T1 postcontrast   Axial bravo postcontrast     IV Contrast:  10 mL of gadobutrol injection (MULTI-DOSE)  IMAGE QUALITY:   Diagnostic  FINDINGS: BRAIN PARENCHYMA:  There is no discrete mass, mass effect or midline shift  No abnormal white matter signal identified  Brainstem and cerebellum demonstrate normal signal  There is no intracranial hemorrhage  There is no evidence of acute infarction and  diffusion imaging is unremarkable  Minimal linear enhancement within the inferior medial right frontal lobe in retrospect unchanged from prior examination suggestive of a developmental venous anomaly best seen on coronal imaging, series 10 images 26 and 27  No enhancement to suggest metastatic disease  VENTRICLES:  Normal  SELLA AND PITUITARY GLAND:  Normal  ORBITS: Normal  PARANASAL SINUSES: Extensive diffuse paranasal sinus opacification most pronounced within the ethmoid air cells  No air-fluid levels to suggest acute sinusitis  Improving aeration of the left maxillary sinus and frontal sinuses  VASCULATURE:  Evaluation of the major intracranial vasculature demonstrates appropriate flow voids   CALVARIUM AND SKULL BASE:  Normal  EXTRACRANIAL SOFT TISSUES:  Normal      Impression: Stable MRI of the brain  No evidence of metastatic disease  Linear enhancement within the inferior medial right frontal lobe best seen on coronal imaging appears vascular in nature, possibly a developmental venous anomaly  In retrospect this is unchanged from previous examinations  Extensive paranasal sinus opacification is again identified  Improving aeration of the left maxillary sinus and frontal sinuses compared to the prior  Workstation performed: [id number]       Labs:   Lab Results   Component Value Date    WBC 6 85 01/23/2018    HGB 9 9 (L) 01/23/2018    HCT 32 0 (L) 01/23/2018    MCV 79 (L) 01/23/2018     01/23/2018     Lab Results   Component Value Date     01/23/2018    K 4 3 01/23/2018     01/23/2018    CO2 26 01/23/2018    ANIONGAP 8 01/23/2018    BUN 14 01/23/2018    CREATININE 1 21 01/23/2018    GLUCOSE 99 01/23/2018    CALCIUM 8 7 01/23/2018    AST 23 01/23/2018    ALT 40 01/23/2018    ALKPHOS 77 01/23/2018    PROT 6 1 (L) 01/23/2018    BILITOT 0 20 01/23/2018    EGFR 66 01/23/2018         No results found for: SPEP, UPEP    No results found for: PSA    No results found for: CEA    No results found for:     No results found for: AFP    No results found for: IRON, TIBC, FERRITIN    No results found for: [id number]      ROS: Review of Systems   Constitutional: Negative  HENT: Negative  No taste, sense of smell, and dry mouth all stable   Eyes: Negative  Respiratory: Negative  Cardiovascular: Negative  Gastrointestinal: Negative  Endocrine: Negative  Genitourinary: Negative  Musculoskeletal: Negative  Skin: Negative  Allergic/Immunologic: Negative  Neurological: Negative  Hematological: Negative  Psychiatric/Behavioral: Negative            Current Medications: Reviewed  Allergies: Reviewed  PMH/FH/SH:  Reviewed      Physical Exam:    There is no height or weight on file to calculate BSA  Wt Readings from Last 3 Encounters:   07/20/18 107 kg (235 lb)   04/18/18 106 kg (234 lb)   04/13/18 107 kg (235 lb)        Temp Readings from Last 3 Encounters:   04/18/18 (!) 97 4 °F (36 3 °C) (Tympanic)   12/27/17 98 3 °F (36 8 °C) (Oral)   12/27/17 (!) 96 1 °F (35 6 °C)        BP Readings from Last 3 Encounters:   04/18/18 150/90   12/27/17 128/80   12/27/17 128/78         Pulse Readings from Last 3 Encounters:   04/18/18 80   12/27/17 95   12/27/17 101     @LASTSAO2(3)@      Physical Exam   Constitutional: He is oriented to person, place, and time  He appears well-developed and well-nourished  HENT:   Head: Normocephalic and atraumatic  Mouth/Throat: Oropharynx is clear and moist    Dry mouth   Eyes: Conjunctivae and EOM are normal  Pupils are equal, round, and reactive to light  Neck: Normal range of motion  Neck supple  No thyromegaly present  Cardiovascular: Normal rate, regular rhythm and normal heart sounds  Pulmonary/Chest: Effort normal and breath sounds normal    Abdominal: Soft  Bowel sounds are normal  He exhibits no distension and no mass  There is no tenderness  Musculoskeletal: Normal range of motion  He exhibits no edema  Lymphadenopathy:     He has no cervical adenopathy  Neurological: He is alert and oriented to person, place, and time  He has normal reflexes  Skin: Skin is warm and dry  No erythema  Vitiligo stable   Psychiatric: He has a normal mood and affect  Vitals reviewed  Goals and Barriers:  Current Goal:  Prolong Survival from Cancer  Barriers: None  Patient's Capacity to Self Care:  Patient fully able to self care      Code Status: [unfilled]  Advance Directive and Living Will:      Power of :

## 2018-07-25 ENCOUNTER — OFFICE VISIT (OUTPATIENT)
Dept: HEMATOLOGY ONCOLOGY | Facility: CLINIC | Age: 58
End: 2018-07-25
Payer: COMMERCIAL

## 2018-07-25 VITALS
DIASTOLIC BLOOD PRESSURE: 80 MMHG | RESPIRATION RATE: 14 BRPM | OXYGEN SATURATION: 98 % | TEMPERATURE: 98.1 F | HEIGHT: 74 IN | BODY MASS INDEX: 30.1 KG/M2 | HEART RATE: 83 BPM | WEIGHT: 234.5 LBS | SYSTOLIC BLOOD PRESSURE: 138 MMHG

## 2018-07-25 DIAGNOSIS — C79.31 MALIGNANT MELANOMA METASTATIC TO BRAIN (HCC): ICD-10-CM

## 2018-07-25 DIAGNOSIS — Z95.828 PORT CATHETER IN PLACE: Primary | ICD-10-CM

## 2018-07-25 DIAGNOSIS — C79.9 METASTATIC MELANOMA (HCC): Primary | ICD-10-CM

## 2018-07-25 DIAGNOSIS — C79.9 METASTATIC CANCER (HCC): Primary | ICD-10-CM

## 2018-07-25 PROCEDURE — 99214 OFFICE O/P EST MOD 30 MIN: CPT | Performed by: SPECIALIST

## 2018-07-25 NOTE — LETTER
July 25, 2018     Comfort Ramos, [address] [address]    Patient: Luis Stanford   YOB: 1960   Date of Visit: 7/25/2018       Dear Dr Spencer Chong: Thank you for referring Luis Stanford to me for evaluation  Below are my notes for this consultation  If you have questions, please do not hesitate to call me  I look forward to following your patient along with you  Sincerely,        Yumiko Maldonado MD        CC: No Recipients  Yumiko Maldonado MD  7/25/2018  2:26 PM  Sign at close encounter  Hematology/Oncology Outpatient Follow- up Note  Luis Stanford 61 y o  male MRN: @ Encounter: [id number]        Date:  7/24/2018        Assessment / Plan:    1  Metastatic melanoma to the brain, liver, and lung  The patient participated on the [id number] clinical trial where he receive Ipilimumab and Nivolumab combination followed by monotherapy with Nivolumab  He completed 39 cycles of Nivolumab on 12/27/17  Overall the patient is doing well  He had an MRI of the brain and CT scans on 7/20/18, which continues to show stable disease  His labs were reviewed  He was also seen by Estefany Issa, our clinical trials nurse today as well  His labs were reviewed  We will see him back in three months with repeat imaging and lab work  Subjective:   CC: follow up regarding metastatic melanoma      HPI:    Mr Nancylee Kanner is a 61year-old male who presented to [facility] [place] on 11/9/15 with a cough and right flank pain  The patient had CT scans, which demonstrated what appeared to be diffuse metastatic disease with multiple pulmonary nodules, bulky mediastinal/hilar adenopathy, pleural based nodules, a right renal mass that measured 9 cm, retroperitoneal lymph nodes, and bone metastasis  A core biopsy of the right kidney was biopsied on 11/10/15 and was suspicious for malignant melanoma, HMB45 was positive   The BRAF mutation was tested at that time and was mutated  He did have an MRI of the brain on 11/24/15, which did show tiny punctate brain lesions (see radiology section for more details)  A PET/CT was obtained on 11/27/15 and confirmed diffuse metastatic disease, which was seen on the previous CT imaging  He had a fairly large right sided kidney mass  A core biopsy of the right kidney was obtained on 11/10/15 and was positive for malignant melanoma  He was also found to have metastatic disease to his brain  He completed three doses of Nivolumab/Ipilimumab on 2/23/16, he is s/p 3 doses  He did develop severe colitis and he was stared on high dose Prednisone on 2/23/16, he will start Nivolumab 3 mg/kg every 2 weeks starting on 4/25/16  [facility] completed two years of treatment on 12/27/17  Interval History:    Overall the patient does well  He has a good energy level with an ECOG performance status of zero  He has a good appetite and his weight has been stable  He continues to have stable xerostomia  He also continues to have dysgeusia and no sense of smell  He denies fevers, chills ,CP, SOB, N/V, diarrhea, all other ROS are unremarkable  PFSH was reviewed  Cancer Staging:   metastatic melanoma to the brain, lung, liver, and lymph nodes    BRAF: mutated    Previous Hematologic/ Oncologic History:    -11/10/15 biopsy of the right kidney: high grade malignant neoplasm with melanocytic differentiation  HMB45 positive    -trial ([id number]) Ipilimumab in combination with Nivolumab- first treatment was 1/4/16 with continuation of single agent Nivolumab since 4/25/2016, 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, re-started treatment on 3/30/17, completed two years of Nivolumab on 12/27/17    Current Hematologic/ Oncologic Treatment:    observation          Test Results:    Imaging: Ct Chest Abdomen Pelvis Wo Contrast    Result Date: 7/20/2018  Narrative: CT CHEST, ABDOMEN AND PELVIS WITHOUT IV CONTRAST INDICATION: Malignant melanoma  C79 31: Secondary malignant neoplasm of brain  COMPARISON:  None  TECHNIQUE: CT examination of the chest, abdomen and pelvis was performed without intravenous contrast   Axial, sagittal, and coronal 2D reformatted images were created from the source data and submitted for interpretation  Radiation dose length product (DLP) for this visit:  1067 mGy-cm   This examination, like all CT scans performed in the [facility], was performed utilizing techniques to minimize radiation dose exposure, including the use of iterative reconstruction and automated exposure control  Enteric contrast was administered  FINDINGS: RECIST INTERPRETATION: TARGET LESIONS: (F) 1  Left upper lung nodule: 5 x 4 mm image 4/19  Previously 5 x 4 mm image 4/18  (G) 2   Subcarinal lymph node: 10 x 6 mm image 2/29  Previously 10 x 6 mm image 2/29  (H) 3  Right retroperitoneal lower pole renal capsule lesion: 7 x 6 mm image 2/73  Previously 7 x 6 mm image 2/74  (I) 4  Left retroperitoneal lesion: Not measurable  Previously not measurable  NONTARGET LESIONS: (K) 1   Stable pulmonary nodules/metastases  (L) 2  Right hepatic dome lesion: Not well visualized without IV contrast   Grossly stable from April 13  (M) 3   Stable sclerotic osseous metastases  (N) 4  Previously Hypervascular hepatic dome lesion not well evaluated on this noncontrast study  CHEST LUNGS:  Stable pulmonary nodules  PLEURA:  No pleural effusion  HEART/GREAT VESSELS:  Cardiac size within normal limits  No pericardial effusion  Atherosclerotic aorta  MEDIASTINUM AND BECKY:  Right chest wall port with catheter tip in the right atrium  CHEST WALL AND LOWER NECK:   No chest wall lesions  No axillary adenopathy  ABDOMEN LIVER/BILIARY TREE:  Ill-defined low-attenuation lesion in the posterior right hepatic lobe measuring approximately 9 x 10 mm, previously 9 x 10 cm  GALLBLADDER:  No calcified gallstones  No pericholecystic inflammatory change   SPLEEN: Unremarkable  PANCREAS:  Unremarkable  ADRENAL GLANDS:  Unremarkable  KIDNEYS/URETERS: Right kidney: Stable renal cystic lesions  No hydronephrosis  Stable scarring lower pole extending into the perirenal fat to Gerota's fascia Left kidney: Stable hydroureter  No obstructing lesion  STOMACH AND BOWEL:  Mild rectal wall thickening, stable from prior exam APPENDIX:  Removed ABDOMINOPELVIC CAVITY:  No ascites or free intraperitoneal air  No lymphadenopathy  VESSELS:  Unremarkable for patient's age  PELVIS REPRODUCTIVE ORGANS:  Unremarkable for patient's age  URINARY BLADDER:  Mild bladder wall trabeculation  ABDOMINAL WALL/INGUINAL REGIONS:  Tiny fat-containing umbilical hernia  Tiny fat-containing left inguinal hernia  OSSEOUS STRUCTURES:  Chronic T12 compression fracture is stable  L5 spondylolysis is also stable  Stable presumed subtle sclerotic metastases, for example at L3 and L5  Stable postop changes lower cervical spine  Stable postop changes right shoulder  Impression: Stable exam  No new metastatic lesions are seen Workstation performed: [id number]     Mri Brain W Wo Contrast    Result Date: 7/20/2018  Narrative: MRI BRAIN WITH AND WITHOUT CONTRAST INDICATION: C79 31: Secondary malignant neoplasm of brain COMPARISON:  4/13/2018  TECHNIQUE: Sagittal T1, axial T2, axial FLAIR, axial T1, axial [place], axial diffusion  Axial bravo precontrast  Axial and coronal T1 postcontrast   Axial bravo postcontrast     IV Contrast:  10 mL of gadobutrol injection (MULTI-DOSE)  IMAGE QUALITY:   Diagnostic  FINDINGS: BRAIN PARENCHYMA:  There is no discrete mass, mass effect or midline shift  No abnormal white matter signal identified  Brainstem and cerebellum demonstrate normal signal  There is no intracranial hemorrhage  There is no evidence of acute infarction and  diffusion imaging is unremarkable     Minimal linear enhancement within the inferior medial right frontal lobe in retrospect unchanged from prior examination suggestive of a developmental venous anomaly best seen on coronal imaging, series 10 images 26 and 27  No enhancement to suggest metastatic disease  VENTRICLES:  Normal  SELLA AND PITUITARY GLAND:  Normal  ORBITS: Normal  PARANASAL SINUSES: Extensive diffuse paranasal sinus opacification most pronounced within the ethmoid air cells  No air-fluid levels to suggest acute sinusitis  Improving aeration of the left maxillary sinus and frontal sinuses  VASCULATURE:  Evaluation of the major intracranial vasculature demonstrates appropriate flow voids  CALVARIUM AND SKULL BASE:  Normal  EXTRACRANIAL SOFT TISSUES:  Normal      Impression: Stable MRI of the brain  No evidence of metastatic disease  Linear enhancement within the inferior medial right frontal lobe best seen on coronal imaging appears vascular in nature, possibly a developmental venous anomaly  In retrospect this is unchanged from previous examinations  Extensive paranasal sinus opacification is again identified  Improving aeration of the left maxillary sinus and frontal sinuses compared to the prior   Workstation performed: [id number]       Labs:   Lab Results   Component Value Date    WBC 6 85 01/23/2018    HGB 9 9 (L) 01/23/2018    HCT 32 0 (L) 01/23/2018    MCV 79 (L) 01/23/2018     01/23/2018     Lab Results   Component Value Date     01/23/2018    K 4 3 01/23/2018     01/23/2018    CO2 26 01/23/2018    ANIONGAP 8 01/23/2018    BUN 14 01/23/2018    CREATININE 1 21 01/23/2018    GLUCOSE 99 01/23/2018    CALCIUM 8 7 01/23/2018    AST 23 01/23/2018    ALT 40 01/23/2018    ALKPHOS 77 01/23/2018    PROT 6 1 (L) 01/23/2018    BILITOT 0 20 01/23/2018    EGFR 66 01/23/2018         No results found for: SPEP, UPEP    No results found for: PSA    No results found for: CEA    No results found for:     No results found for: AFP    No results found for: IRON, TIBC, FERRITIN    No results found for: [id number]      ROS: Review of Systems   Constitutional: Negative  HENT: Negative  No taste, sense of smell, and dry mouth all stable   Eyes: Negative  Respiratory: Negative  Cardiovascular: Negative  Gastrointestinal: Negative  Endocrine: Negative  Genitourinary: Negative  Musculoskeletal: Negative  Skin: Negative  Allergic/Immunologic: Negative  Neurological: Negative  Hematological: Negative  Psychiatric/Behavioral: Negative  Current Medications: Reviewed  Allergies: Reviewed  PMH/FH/SH:  Reviewed      Physical Exam:    There is no height or weight on file to calculate BSA  Wt Readings from Last 3 Encounters:   07/20/18 107 kg (235 lb)   04/18/18 106 kg (234 lb)   04/13/18 107 kg (235 lb)        Temp Readings from Last 3 Encounters:   04/18/18 (!) 97 4 °F (36 3 °C) (Tympanic)   12/27/17 98 3 °F (36 8 °C) (Oral)   12/27/17 (!) 96 1 °F (35 6 °C)        BP Readings from Last 3 Encounters:   04/18/18 150/90   12/27/17 128/80   12/27/17 128/78         Pulse Readings from Last 3 Encounters:   04/18/18 80   12/27/17 95   12/27/17 101     @LASTSAO2(3)@      Physical Exam   Constitutional: He is oriented to person, place, and time  He appears well-developed and well-nourished  HENT:   Head: Normocephalic and atraumatic  Mouth/Throat: Oropharynx is clear and moist    Dry mouth   Eyes: Conjunctivae and EOM are normal  Pupils are equal, round, and reactive to light  Neck: Normal range of motion  Neck supple  No thyromegaly present  Cardiovascular: Normal rate, regular rhythm and normal heart sounds  Pulmonary/Chest: Effort normal and breath sounds normal    Abdominal: Soft  Bowel sounds are normal  He exhibits no distension and no mass  There is no tenderness  Musculoskeletal: Normal range of motion  He exhibits no edema  Lymphadenopathy:     He has no cervical adenopathy  Neurological: He is alert and oriented to person, place, and time  He has normal reflexes     Skin: Skin is warm and dry  No erythema  Vitiligo stable   Psychiatric: He has a normal mood and affect  Vitals reviewed  Goals and Barriers:  Current Goal:  Prolong Survival from Cancer  Barriers: None  Patient's Capacity to Self Care:  Patient fully able to self care      Code Status: [unfilled]  Advance Directive and Living Will:      Power of :

## 2018-09-28 DIAGNOSIS — C43.9 MALIGNANT MELANOMA, UNSPECIFIED SITE (HCC): Primary | ICD-10-CM

## 2018-10-05 ENCOUNTER — HOSPITAL ENCOUNTER (OUTPATIENT)
Dept: MRI IMAGING | Facility: HOSPITAL | Age: 58
Discharge: HOME | End: 2018-10-05
Payer: COMMERCIAL

## 2018-10-05 ENCOUNTER — HOSPITAL ENCOUNTER (OUTPATIENT)
Dept: CT IMAGING | Facility: HOSPITAL | Age: 58
Discharge: HOME | End: 2018-10-05
Payer: COMMERCIAL

## 2018-10-05 DIAGNOSIS — C43.9 MALIGNANT MELANOMA, UNSPECIFIED SITE (HCC): ICD-10-CM

## 2018-10-05 PROCEDURE — 74176 CT ABD & PELVIS W/O CONTRAST: CPT

## 2018-10-05 PROCEDURE — 71250 CT THORAX DX C-: CPT

## 2018-10-05 PROCEDURE — A9585 GADOBUTROL INJECTION: HCPCS | Performed by: PHYSICIAN ASSISTANT

## 2018-10-05 PROCEDURE — 70553 MRI BRAIN STEM W/O & W/DYE: CPT

## 2018-10-05 RX ADMIN — GADOBUTROL 10 ML: 604.72 INJECTION INTRAVENOUS at 10:11

## 2018-10-10 DIAGNOSIS — C43.9 MALIGNANT MELANOMA, UNSPECIFIED SITE (HCC): Primary | ICD-10-CM

## 2018-10-11 ENCOUNTER — OFFICE VISIT (OUTPATIENT)
Dept: HEMATOLOGY ONCOLOGY | Facility: CLINIC | Age: 58
End: 2018-10-11
Payer: COMMERCIAL

## 2018-10-11 VITALS
BODY MASS INDEX: 30.93 KG/M2 | SYSTOLIC BLOOD PRESSURE: 132 MMHG | DIASTOLIC BLOOD PRESSURE: 78 MMHG | TEMPERATURE: 98.3 F | HEIGHT: 74 IN | WEIGHT: 241 LBS | HEART RATE: 74 BPM | RESPIRATION RATE: 12 BRPM | OXYGEN SATURATION: 97 %

## 2018-10-11 DIAGNOSIS — C79.31 MALIGNANT MELANOMA METASTATIC TO BRAIN (HCC): Primary | ICD-10-CM

## 2018-10-11 DIAGNOSIS — C79.9 METASTATIC MELANOMA (HCC): ICD-10-CM

## 2018-10-11 PROCEDURE — 99214 OFFICE O/P EST MOD 30 MIN: CPT | Performed by: SPECIALIST

## 2018-10-11 NOTE — PROGRESS NOTES
Hematology/Oncology Outpatient Follow- up Note  Kalyan Montalvo 60 y o  male MRN: @ Encounter: [id number]        Date:  10/10/2018        Assessment / Plan:    1  Metastatic melanoma to the brain, liver, and lung  The patient participated on the [id number] clinical trial where he receive Ipilimumab and Nivolumab combination followed by monotherapy with Nivolumab  He completed 39 cycles of Nivolumab on 12/27/17  Overall the patient is doing well  He had an MRI of the brain and CT scans on 10/5/18, which continues to show stable disease  His labs were reviewed  He was also seen by Tim Chacko, our clinical trials nurse today as well  We will see him back in three months with repeat imaging and lab work as outlined by the clinical trial         Subjective:   CC: follow up regarding metastatic melanoma      HPI:    Mr Lyndee Lombard is a 60year-old male who presented to [facility] AT [place] on 11/9/15 with a cough and right flank pain  The patient had CT scans, which demonstrated what appeared to be diffuse metastatic disease with multiple pulmonary nodules, bulky mediastinal/hilar adenopathy, pleural based nodules, a right renal mass that measured 9 cm, retroperitoneal lymph nodes, and bone metastasis  A core biopsy of the right kidney was biopsied on 11/10/15 and was suspicious for malignant melanoma, HMB45 was positive  The BRAF mutation was tested at that time and was mutated  He did have an MRI of the brain on 11/24/15, which did show tiny punctate brain lesions (see radiology section for more details)  A PET/CT was obtained on 11/27/15 and confirmed diffuse metastatic disease, which was seen on the previous CT imaging  He had a fairly large right sided kidney mass  A core biopsy of the right kidney was obtained on 11/10/15 and was positive for malignant melanoma  He was also found to have metastatic disease to his brain  He completed three doses of Nivolumab/Ipilimumab on 2/23/16, he is s/p 3 doses   He did develop severe colitis and he was stared on high dose Prednisone on 2/23/16, he will start Nivolumab 3 mg/kg every 2 weeks starting on 4/25/16  Alfredito Mcarthur completed two years of treatment on 12/27/17  Interval History:    Overall the patient is doing well  He has a great energy level with an ECOG performance status of zero  He has a good appetite and his weight has been stable  His smell is starting to come back  He still has dysgeusia but has a good appetite  He also continues to have stable xerostomia and is hydrating well  He denies fevers, chills, CP, sOB, N/V, diarrhea, all other ROS are unremarkable  PFSh was reviewed  Cancer Staging:  metastatic melanoma to the brain, lung, liver, and lymph nodes  No matching staging information was found for the patient  BRAF: mutated    Previous Hematologic/ Oncologic History:    -11/10/15 biopsy of the right kidney: high grade malignant neoplasm with melanocytic differentiation  HMB45 positive    -trial ([id number]) Ipilimumab in combination with Nivolumab- first treatment was 1/4/16 with continuation of single agent Nivolumab since 4/25/2016, 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, re-started treatment on 3/30/17, completed two years of Nivolumab on 12/27/17    Current Hematologic/ Oncologic Treatment:    observation          Test Results:    Imaging: Ct Chest Abdomen Pelvis Wo Contrast    Result Date: 10/5/2018  Narrative: CT CHEST, ABDOMEN AND PELVIS WITHOUT IV CONTRAST INDICATION:   C43 9: Malignant melanoma of skin, unspecified  RECIST protocol  COMPARISON:  Multiple prior exams, most recent dated 7/20/2018  TECHNIQUE: CT examination of the chest, abdomen and pelvis was performed without intravenous contrast   Axial, sagittal, and coronal 2D reformatted images were created from the source data and submitted for interpretation  Radiation dose length product (DLP) for this visit:  1158 mGy-cm     This examination, like all CT scans performed in the [facility], was performed utilizing techniques to minimize radiation dose exposure, including the use of iterative reconstruction and automated exposure control  Enteric contrast was administered  FINDINGS: RECIST target lesions: (F) 1  Left upper lung nodule: 5 x 4 mm image 3/20, unchanged  Previously 5 x 4 mm image 4/18  (G) 2   Subcarinal lymph node: 11 x 7 mm on image 2/29 (previously 10 x 6 mm)  (H) 3  Right retroperitoneal lower pole renal capsule lesion: 8 x 3 mm on image 2/72 (previously 7 x 6 mm)  (I) 4  Left retroperitoneal lesion: Not measurable  Nontarget lesions: (K) 1   Stable pulmonary nodules/metastases  (L) 2  Right hepatic dome lesion: Not well visualized on this noncontrast exam  (M) 3   Stable sclerotic osseous metastases  (N) 4  Previously Hypervascular hepatic dome lesion not well evaluated on this noncontrast study  CHEST LUNGS:  Stable lung nodules  PLEURA:  Unremarkable  HEART/GREAT VESSELS:  Unremarkable for patient's age  MEDIASTINUM AND BECKY:  Stable subcarinal lymph node, as above  No new adenopathy  CHEST WALL AND LOWER NECK:   Unremarkable  ABDOMEN LIVER/BILIARY TREE:  No definite lesions appreciated on this unenhanced exam, as above  GALLBLADDER:  No calcified gallstones  No pericholecystic inflammatory change  SPLEEN:  Unremarkable  PANCREAS:  Unremarkable  ADRENAL GLANDS:  Unremarkable  KIDNEYS/URETERS:  Stable right lower pole pericapsular soft tissue  Stable left hydroureteronephrosis  Stable tiny exophytic cyst at the right upper pole, and small partially exophytic cortical cyst at the right lower pole  STOMACH AND BOWEL:  Unremarkable  APPENDIX:  No findings to suggest appendicitis  ABDOMINOPELVIC CAVITY:  No ascites or free intraperitoneal air  No lymphadenopathy  VESSELS:  Unremarkable for patient's age  PELVIS REPRODUCTIVE ORGANS:  Unremarkable for patient's age  URINARY BLADDER:  Unremarkable   ABDOMINAL WALL/INGUINAL REGIONS: Unremarkable  OSSEOUS STRUCTURES:  Stable T12 compression deformity  Impression: Stable metastatic disease  No new lesions identified  Workstation performed: [id number]     Mri Brain W Wo Contrast    Result Date: 10/5/2018  Narrative: MRI BRAIN WITH AND WITHOUT CONTRAST INDICATION: C43 9: Malignant melanoma of skin, unspecified  Stage IV melanoma  Status post chemotherapy  COMPARISON:  7/20/2018 TECHNIQUE: Sagittal T1, axial T2, axial FLAIR, axial T1, axial [place], axial diffusion  Sagittal, axial and coronal T1 postcontrast   Axial BRAVO post contrast   IV Contrast:  10 mL of gadobutrol injection (MULTI-DOSE)  IMAGE QUALITY:   Diagnostic  FINDINGS: BRAIN PARENCHYMA:  There is no discrete mass, mass effect or midline shift  No abnormal white matter signal identified  Brainstem and cerebellum demonstrate normal signal  There is no intracranial hemorrhage  There is no evidence of acute infarction and  diffusion imaging is unremarkable  Postcontrast imaging of the brain demonstrates no abnormal enhancement  VENTRICLES:  Normal  SELLA AND PITUITARY GLAND:  Normal  ORBITS:  Normal  PARANASAL SINUSES:  Moderate scattered sinus disease noted most pronounced in the ethmoid sinuses is similar to the previous study  VASCULATURE:  Evaluation of the major intracranial vasculature demonstrates appropriate flow voids  CALVARIUM AND SKULL BASE:  Normal  EXTRACRANIAL SOFT TISSUES:  Normal      Impression: 1  No acute infarction, intracranial hemorrhage or mass  No MR evidence of brain metastases 2  Moderate sinusitis, most pronounced in the ethmoid air cells, is similar to the previous study   Workstation performed: [id number]       Labs:   Lab Results   Component Value Date    WBC 6 85 01/23/2018    HGB 9 9 (L) 01/23/2018    HCT 32 0 (L) 01/23/2018    MCV 79 (L) 01/23/2018     01/23/2018     Lab Results   Component Value Date     01/23/2018    K 4 3 01/23/2018     01/23/2018    CO2 26 01/23/2018 ANIONGAP 8 01/04/2016    BUN 14 01/23/2018    CREATININE 1 21 01/23/2018    GLUCOSE 91 01/04/2016    CALCIUM 8 7 01/23/2018    AST 23 01/23/2018    ALT 40 01/23/2018    ALKPHOS 77 01/23/2018    PROT 8 2 01/04/2016    BILITOT 0 40 01/04/2016    EGFR 66 01/23/2018         No results found for: SPEP, UPEP    No results found for: PSA    No results found for: CEA    No results found for:     No results found for: AFP    No results found for: IRON, TIBC, FERRITIN    No results found for: [id number]      ROS: Review of Systems   Constitutional: Negative  HENT: Negative  Taste changes are stable, xerostomia is stable   Eyes: Negative  Respiratory: Negative  Cardiovascular: Negative  Gastrointestinal: Negative  Endocrine: Negative  Genitourinary: Negative  Musculoskeletal: Negative  Skin: Negative  Allergic/Immunologic: Negative  Neurological: Negative  Hematological: Negative  Psychiatric/Behavioral: Negative  Current Medications: Reviewed  Allergies: Reviewed  PMH/FH/SH:  Reviewed      Physical Exam:    There is no height or weight on file to calculate BSA  Wt Readings from Last 3 Encounters:   07/25/18 106 kg (234 lb 8 oz)   10/05/18 107 kg (235 lb)   07/20/18 107 kg (235 lb)        Temp Readings from Last 3 Encounters:   07/25/18 98 1 °F (36 7 °C)   04/18/18 (!) 97 4 °F (36 3 °C) (Tympanic)   12/27/17 98 3 °F (36 8 °C) (Oral)        BP Readings from Last 3 Encounters:   07/25/18 138/80   04/18/18 150/90   12/27/17 128/80         Pulse Readings from Last 3 Encounters:   07/25/18 83   04/18/18 80   12/27/17 95     @LASTSAO2(3)@      Physical Exam   Constitutional: He is oriented to person, place, and time  He appears well-developed and well-nourished  HENT:   Head: Normocephalic and atraumatic  Mouth/Throat: Oropharynx is clear and moist    Eyes: Pupils are equal, round, and reactive to light  Conjunctivae and EOM are normal    Neck: Normal range of motion  Neck supple  No thyromegaly present  Cardiovascular: Normal rate, regular rhythm and normal heart sounds  Pulmonary/Chest: Effort normal and breath sounds normal    Abdominal: Soft  Bowel sounds are normal  He exhibits no distension and no mass  There is no tenderness  Musculoskeletal: Normal range of motion  He exhibits no edema  Lymphadenopathy:     He has no cervical adenopathy  Neurological: He is alert and oriented to person, place, and time  He has normal reflexes  Skin: Skin is warm and dry  No erythema  Psychiatric: He has a normal mood and affect  Vitals reviewed  Goals and Barriers:  Current Goal:  Prolong Survival from Cancer  Barriers: None  Patient's Capacity to Self Care:  Patient fully able to self care      Code Status: [unfilled]  Advance Directive and Living Will:      Power of :

## 2018-10-11 NOTE — LETTER
October 11, 2018     [facility], [address] [phone]    Patient: Cisco Alvarez   YOB: 1960   Date of Visit: 10/11/2018       Dear Dr Daron Aj: Thank you for referring Cisco Alvarez to me for evaluation  Below are my notes for this consultation  If you have questions, please do not hesitate to call me  I look forward to following your patient along with you  Sincerely,        Rosalio Cade MD        CC: No Recipients  Rosalio Cade MD  10/11/2018  3:04 PM  Sign at close encounter  Hematology/Oncology Outpatient Follow- up Note  Cisco Alvarez 60 y o  male MRN: @ Encounter: [id number]        Date:  10/10/2018        Assessment / Plan:    1  Metastatic melanoma to the brain, liver, and lung  The patient participated on the [id number] clinical trial where he receive Ipilimumab and Nivolumab combination followed by monotherapy with Nivolumab  He completed 39 cycles of Nivolumab on 12/27/17  Overall the patient is doing well  He had an MRI of the brain and CT scans on 10/5/18, which continues to show stable disease  His labs were reviewed  He was also seen by Ady Clay, our clinical trials nurse today as well  We will see him back in three months with repeat imaging and lab work as outlined by the clinical trial         Subjective:   CC: follow up regarding metastatic melanoma      HPI:    Mr Elias Payton is a 60year-old male who presented to [facility] [place] on 11/9/15 with a cough and right flank pain  The patient had CT scans, which demonstrated what appeared to be diffuse metastatic disease with multiple pulmonary nodules, bulky mediastinal/hilar adenopathy, pleural based nodules, a right renal mass that measured 9 cm, retroperitoneal lymph nodes, and bone metastasis  A core biopsy of the right kidney was biopsied on 11/10/15 and was suspicious for malignant melanoma, HMB45 was positive   The BRAF mutation was tested at that time and was mutated  He did have an MRI of the brain on 11/24/15, which did show tiny punctate brain lesions (see radiology section for more details)  A PET/CT was obtained on 11/27/15 and confirmed diffuse metastatic disease, which was seen on the previous CT imaging  He had a fairly large right sided kidney mass  A core biopsy of the right kidney was obtained on 11/10/15 and was positive for malignant melanoma  He was also found to have metastatic disease to his brain  He completed three doses of Nivolumab/Ipilimumab on 2/23/16, he is s/p 3 doses  He did develop severe colitis and he was stared on high dose Prednisone on 2/23/16, he will start Nivolumab 3 mg/kg every 2 weeks starting on 4/25/16  Shearon Begin completed two years of treatment on 12/27/17  Interval History:    Overall the patient is doing well  He has a great energy level with an ECOG performance status of zero  He has a good appetite and his weight has been stable  His smell is starting to come back  He still has dysgeusia but has a good appetite  He also continues to have stable xerostomia and is hydrating well  He denies fevers, chills, CP, sOB, N/V, diarrhea, all other ROS are unremarkable  PFSh was reviewed  Cancer Staging:  metastatic melanoma to the brain, lung, liver, and lymph nodes  No matching staging information was found for the patient  BRAF: mutated    Previous Hematologic/ Oncologic History:    -11/10/15 biopsy of the right kidney: high grade malignant neoplasm with melanocytic differentiation   HMB45 positive    -trial ([id number]) Ipilimumab in combination with Nivolumab- first treatment was 1/4/16 with continuation of single agent Nivolumab since 4/25/2016, 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, re-started treatment on 3/30/17, completed two years of Nivolumab on 12/27/17    Current Hematologic/ Oncologic Treatment:    observation          Test Results:    Imaging: Ct Chest Abdomen Pelvis Wo Contrast    Result Date: 10/5/2018  Narrative: CT CHEST, ABDOMEN AND PELVIS WITHOUT IV CONTRAST INDICATION:   C43 9: Malignant melanoma of skin, unspecified  RECIST protocol  COMPARISON:  Multiple prior exams, most recent dated 7/20/2018  TECHNIQUE: CT examination of the chest, abdomen and pelvis was performed without intravenous contrast   Axial, sagittal, and coronal 2D reformatted images were created from the source data and submitted for interpretation  Radiation dose length product (DLP) for this visit:  1158 mGy-cm   This examination, like all CT scans performed in the [place], was performed utilizing techniques to minimize radiation dose exposure, including the use of iterative reconstruction and automated exposure control  Enteric contrast was administered  FINDINGS: RECIST target lesions: (F) 1  Left upper lung nodule: 5 x 4 mm image 3/20, unchanged  Previously 5 x 4 mm image 4/18  (G) 2   Subcarinal lymph node: 11 x 7 mm on image 2/29 (previously 10 x 6 mm)  (H) 3  Right retroperitoneal lower pole renal capsule lesion: 8 x 3 mm on image 2/72 (previously 7 x 6 mm)  (I) 4  Left retroperitoneal lesion: Not measurable  Nontarget lesions: (K) 1   Stable pulmonary nodules/metastases  (L) 2  Right hepatic dome lesion: Not well visualized on this noncontrast exam  (M) 3   Stable sclerotic osseous metastases  (N) 4  Previously Hypervascular hepatic dome lesion not well evaluated on this noncontrast study  CHEST LUNGS:  Stable lung nodules  PLEURA:  Unremarkable  HEART/GREAT VESSELS:  Unremarkable for patient's age  MEDIASTINUM AND BECKY:  Stable subcarinal lymph node, as above  No new adenopathy  CHEST WALL AND LOWER NECK:   Unremarkable  ABDOMEN LIVER/BILIARY TREE:  No definite lesions appreciated on this unenhanced exam, as above  GALLBLADDER:  No calcified gallstones  No pericholecystic inflammatory change  SPLEEN:  Unremarkable  PANCREAS:  Unremarkable   ADRENAL GLANDS: Unremarkable  KIDNEYS/URETERS:  Stable right lower pole pericapsular soft tissue  Stable left hydroureteronephrosis  Stable tiny exophytic cyst at the right upper pole, and small partially exophytic cortical cyst at the right lower pole  STOMACH AND BOWEL:  Unremarkable  APPENDIX:  No findings to suggest appendicitis  ABDOMINOPELVIC CAVITY:  No ascites or free intraperitoneal air  No lymphadenopathy  VESSELS:  Unremarkable for patient's age  PELVIS REPRODUCTIVE ORGANS:  Unremarkable for patient's age  URINARY BLADDER:  Unremarkable  ABDOMINAL WALL/INGUINAL REGIONS:  Unremarkable  OSSEOUS STRUCTURES:  Stable T12 compression deformity  Impression: Stable metastatic disease  No new lesions identified  Workstation performed: [id number]     Mri Brain W Wo Contrast    Result Date: 10/5/2018  Narrative: MRI BRAIN WITH AND WITHOUT CONTRAST INDICATION: C43 9: Malignant melanoma of skin, unspecified  Stage IV melanoma  Status post chemotherapy  COMPARISON:  7/20/2018 TECHNIQUE: Sagittal T1, axial T2, axial FLAIR, axial T1, axial [place], axial diffusion  Sagittal, axial and coronal T1 postcontrast   Axial BRAVO post contrast   IV Contrast:  10 mL of gadobutrol injection (MULTI-DOSE)  IMAGE QUALITY:   Diagnostic  FINDINGS: BRAIN PARENCHYMA:  There is no discrete mass, mass effect or midline shift  No abnormal white matter signal identified  Brainstem and cerebellum demonstrate normal signal  There is no intracranial hemorrhage  There is no evidence of acute infarction and  diffusion imaging is unremarkable  Postcontrast imaging of the brain demonstrates no abnormal enhancement  VENTRICLES:  Normal  SELLA AND PITUITARY GLAND:  Normal  ORBITS:  Normal  PARANASAL SINUSES:  Moderate scattered sinus disease noted most pronounced in the ethmoid sinuses is similar to the previous study  VASCULATURE:  Evaluation of the major intracranial vasculature demonstrates appropriate flow voids   CALVARIUM AND SKULL BASE:  Normal  EXTRACRANIAL SOFT TISSUES:  Normal      Impression: 1  No acute infarction, intracranial hemorrhage or mass  No MR evidence of brain metastases 2  Moderate sinusitis, most pronounced in the ethmoid air cells, is similar to the previous study  Workstation performed: [id number]       Labs:   Lab Results   Component Value Date    WBC 6 85 01/23/2018    HGB 9 9 (L) 01/23/2018    HCT 32 0 (L) 01/23/2018    MCV 79 (L) 01/23/2018     01/23/2018     Lab Results   Component Value Date     01/23/2018    K 4 3 01/23/2018     01/23/2018    CO2 26 01/23/2018    ANIONGAP 8 01/04/2016    BUN 14 01/23/2018    CREATININE 1 21 01/23/2018    GLUCOSE 91 01/04/2016    CALCIUM 8 7 01/23/2018    AST 23 01/23/2018    ALT 40 01/23/2018    ALKPHOS 77 01/23/2018    PROT 8 2 01/04/2016    BILITOT 0 40 01/04/2016    EGFR 66 01/23/2018         No results found for: SPEP, UPEP    No results found for: PSA    No results found for: CEA    No results found for:     No results found for: AFP    No results found for: IRON, TIBC, FERRITIN    No results found for: [id number]      ROS: Review of Systems   Constitutional: Negative  HENT: Negative  Taste changes are stable, xerostomia is stable   Eyes: Negative  Respiratory: Negative  Cardiovascular: Negative  Gastrointestinal: Negative  Endocrine: Negative  Genitourinary: Negative  Musculoskeletal: Negative  Skin: Negative  Allergic/Immunologic: Negative  Neurological: Negative  Hematological: Negative  Psychiatric/Behavioral: Negative  Current Medications: Reviewed  Allergies: Reviewed  PMH/FH/SH:  Reviewed      Physical Exam:    There is no height or weight on file to calculate BSA      Wt Readings from Last 3 Encounters:   07/25/18 106 kg (234 lb 8 oz)   10/05/18 107 kg (235 lb)   07/20/18 107 kg (235 lb)        Temp Readings from Last 3 Encounters:   07/25/18 98 1 °F (36 7 °C)   04/18/18 (!) 97 4 °F (36 3 °C) (Tympanic) 12/27/17 98 3 °F (36 8 °C) (Oral)        BP Readings from Last 3 Encounters:   07/25/18 138/80   04/18/18 150/90   12/27/17 128/80         Pulse Readings from Last 3 Encounters:   07/25/18 83   04/18/18 80   12/27/17 95     @LASTSAO2(3)@      Physical Exam   Constitutional: He is oriented to person, place, and time  He appears well-developed and well-nourished  HENT:   Head: Normocephalic and atraumatic  Mouth/Throat: Oropharynx is clear and moist    Eyes: Pupils are equal, round, and reactive to light  Conjunctivae and EOM are normal    Neck: Normal range of motion  Neck supple  No thyromegaly present  Cardiovascular: Normal rate, regular rhythm and normal heart sounds  Pulmonary/Chest: Effort normal and breath sounds normal    Abdominal: Soft  Bowel sounds are normal  He exhibits no distension and no mass  There is no tenderness  Musculoskeletal: Normal range of motion  He exhibits no edema  Lymphadenopathy:     He has no cervical adenopathy  Neurological: He is alert and oriented to person, place, and time  He has normal reflexes  Skin: Skin is warm and dry  No erythema  Psychiatric: He has a normal mood and affect  Vitals reviewed  Goals and Barriers:  Current Goal:  Prolong Survival from Cancer  Barriers: None  Patient's Capacity to Self Care:  Patient fully able to self care      Code Status: [unfilled]  Advance Directive and Living Will:      Power of :

## 2018-12-10 ENCOUNTER — DOCUMENTATION (OUTPATIENT)
Dept: OTHER | Facility: HOSPITAL | Age: 58
End: 2018-12-10

## 2018-12-10 ENCOUNTER — TELEPHONE (OUTPATIENT)
Dept: OTHER | Facility: HOSPITAL | Age: 58
End: 2018-12-10

## 2018-12-21 ENCOUNTER — HOSPITAL ENCOUNTER (OUTPATIENT)
Dept: MRI IMAGING | Facility: HOSPITAL | Age: 58
Discharge: HOME | End: 2018-12-21
Payer: COMMERCIAL

## 2018-12-21 ENCOUNTER — HOSPITAL ENCOUNTER (OUTPATIENT)
Dept: CT IMAGING | Facility: HOSPITAL | Age: 58
Discharge: HOME | End: 2018-12-21
Payer: COMMERCIAL

## 2018-12-21 DIAGNOSIS — C43.9 MALIGNANT MELANOMA, UNSPECIFIED SITE (HCC): Primary | ICD-10-CM

## 2018-12-21 DIAGNOSIS — C43.9 MALIGNANT MELANOMA, UNSPECIFIED SITE (HCC): ICD-10-CM

## 2018-12-21 PROCEDURE — 71250 CT THORAX DX C-: CPT

## 2018-12-21 PROCEDURE — A9585 GADOBUTROL INJECTION: HCPCS | Performed by: PHYSICIAN ASSISTANT

## 2018-12-21 PROCEDURE — 74176 CT ABD & PELVIS W/O CONTRAST: CPT

## 2018-12-21 PROCEDURE — 70553 MRI BRAIN STEM W/O & W/DYE: CPT

## 2018-12-21 RX ADMIN — GADOBUTROL 10 ML: 604.72 INJECTION INTRAVENOUS at 14:11

## 2018-12-27 NOTE — PROGRESS NOTES
Hematology/Oncology Outpatient Follow- up Note  Macarena Sims 60 y o  male MRN: @ Encounter: [id number]        Date:  12/27/2018        Assessment / Plan:    1  Metastatic melanoma to the brain, liver, and lung  The patient participated on the CA [id number] clinical trial where he receive Ipilimumab and Nivolumab combination followed by monotherapy with Nivolumab  He completed 39 cycles of Nivolumab on 12/27/17  Overall the patient is doing well  He had an MRI of the brain and CT scans on 12/21/18, which continues to show stable disease  His labs were reviewed  He was also seen by Jose Patel, our clinical trials nurse today as well  We will see him back in three months with repeat imaging and lab work  He will no longer be followed by the clinical trial as of 1/1/19  I, Magda Madera, have reviewed the updated procedure, risks, and alternatives for the CA [id number] with the patient on 12/28/18  Subjective:   CC: follow up regarding metastatic melanoma      HPI:    Mr Alcira Joya is a 60year-old male who presented to [facility] on [facility]/9/15 with a cough and right flank pain  The patient had CT scans, which demonstrated what appeared to be diffuse metastatic disease with multiple pulmonary nodules, bulky mediastinal/hilar adenopathy, pleural based nodules, a right renal mass that measured 9 cm, retroperitoneal lymph nodes, and bone metastasis  A core biopsy of the right kidney was biopsied on 11/10/15 and was suspicious for malignant melanoma, HMB45 was positive  The BRAF mutation was tested at that time and was mutated  He did have an MRI of the brain on 11/24/15, which did show tiny punctate brain lesions (see radiology section for more details)  A PET/CT was obtained on 11/27/15 and confirmed diffuse metastatic disease, which was seen on the previous CT imaging  He had a fairly large right sided kidney mass   A core biopsy of the right kidney was obtained on 11/10/15 and was positive for malignant melanoma  He was also found to have metastatic disease to his brain  He completed three doses of Nivolumab/Ipilimumab on 2/23/16, he is s/p 3 doses  He did develop severe colitis and he was stared on high dose Prednisone on 2/23/16, he will start Nivolumab 3 mg/kg every 2 weeks starting on 4/25/16  Tasneem Oneal completed two years of treatment on 12/27/17        Interval History:    Overall the patient is doing well  He has a good energy level with an ECOG performance status of zero  He has a good appetite and his weight has been stable  He continues to have stable dysgeusia and difficult smelling  He does have a mild headache occasionally, which has not worsened  He otherwise denies fevers, chills, CP, SOB, N/V, diarrhea, all other ROS are unremarkable  PFSH was reviewed  Cancer Staging:  metastatic melanoma to the brain, lung, liver, and lymph nodes    BRAF: mutated    Previous Hematologic/ Oncologic History:    -11/10/15 biopsy of the right kidney: high grade malignant neoplasm with melanocytic differentiation  HMB45 positive    -trial ([id number]) Ipilimumab in combination with Nivolumab- first treatment was 1/4/16 with continuation of single agent Nivolumab since 4/25/2016, 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, re-started treatment on 3/30/17, completed two years of Nivolumab on 12/27/17    Current Hematologic/ Oncologic Treatment:    observation          Test Results:    Imaging: Ct Chest Abdomen Pelvis Wo Contrast    Result Date: 12/24/2018  Narrative: CT CHEST, ABDOMEN AND PELVIS WITHOUT IV CONTRAST INDICATION:   C43 9: Malignant melanoma of skin, unspecified  Reevaluate metastatic disease   COMPARISON: Multiple prior examinations, most recently October 5, 2018 TECHNIQUE: CT examination of the chest, abdomen and pelvis was performed without intravenous contrast   Axial, sagittal, and coronal 2D reformatted images were created from the source data and submitted for interpretation  Radiation dose length product (DLP) for this visit:  1078 mGy-cm   This examination, like all CT scans performed in the [place], was performed utilizing techniques to minimize radiation dose exposure, including the use of iterative reconstruction and automated exposure control  Enteric contrast was administered  FINDINGS: RECIST target lesions: (F) 1  Left upper lung nodule: 5 x 4 mm image 36 of series 5, not significantly changed from prior examination  Previously 5 x 4 mm  (G) 2   Subcarinal lymph node: 12 x 7 mm on image 29 of series 2, not significantly changed from 11 x 7 mm on previous examination  (H) 3  Right retroperitoneal lower pole renal capsule lesion: Approximately 8 x 4 mm on image 71 of series 2, similar from 8 x 3 mm on previous examination  (I) 4  Left retroperitoneal lesion: Not measurable  Nontarget lesions: (K) 1   Stable pulmonary nodules/metastases  (L) 2  Right hepatic dome lesion: Not well visualized on this noncontrast exam  (M) 3   Stable sclerotic osseous metastases  (N) 4  Previously Hypervascular hepatic dome lesion not well evaluated on this noncontrast study  CHEST LUNGS:  Few pulmonary nodules as well as nodular densities along the major fissure in the left mid chest and subpleural right basilar nodular densities are similar from previous examination  Subpleural calcification in the right mid chest is unchanged  No new or suspicious pulmonary nodule or mass  PLEURA:  Unremarkable  HEART/GREAT VESSELS:  Unremarkable for patient's age  MEDIASTINUM AND BECKY:  Stable subcarinal lymph node, as above  No new adenopathy  CHEST WALL AND LOWER NECK:   Unremarkable   ABDOMEN LIVER/BILIARY TREE:  Questionable hypodensity in the posterior dome of the right hepatic lobe measuring 16 mm on image 49 of series 2, only visible with active "windowing of the image" may or may not represent a metastatic lesion but is not new, as the finding is retrospectively visible on multiple prior examinations and similar in size at least as far back as June 2017  No new pulmonary nodule or mass  GALLBLADDER:  No calcified gallstones  No pericholecystic inflammatory change  SPLEEN:  Unremarkable  PANCREAS:  Unremarkable  ADRENAL GLANDS:  Unremarkable  KIDNEYS/URETERS:  Unchanged right lower pole pericapsular soft tissue  Unchanged left hydroureteronephrosis  Stable tiny exophytic cyst at the right upper pole, and small partially exophytic cortical cyst at the right lower pole  STOMACH AND BOWEL:  Unremarkable  APPENDIX:  No findings to suggest appendicitis  ABDOMINOPELVIC CAVITY:  No ascites or free intraperitoneal air  No lymphadenopathy  VESSELS:  Unremarkable for patient's age  PELVIS REPRODUCTIVE ORGANS:  Unremarkable for patient's age  URINARY BLADDER:  Unremarkable  ABDOMINAL WALL/INGUINAL REGIONS:  Unremarkable  OSSEOUS STRUCTURES:  Unchanged chronic T12 compression deformity  Chronic bilateral L5 spondylolysis without spondylolisthesis  No destructive osseous lesions  Impression: No significant interval change in metastatic disease when compared to previous examinations  No new lesions identified in the chest, abdomen or pelvis to suggest new metastatic disease  Workstation performed: [id number]     Mri Brain W Wo Contrast    Result Date: 12/24/2018  Narrative: MRI BRAIN WITH AND WITHOUT CONTRAST INDICATION: C43 9: Malignant melanoma of skin, unspecified  History of stage IV melanoma, completion of therapy  Evaluate for metastatic disease  COMPARISON:  MRI of the brain from October 5, 2018  TECHNIQUE: Sagittal T1, axial T2, axial FLAIR, axial T1, axial [place], axial diffusion  Sagittal, axial and coronal T1 postcontrast   Axial BRAVO post contrast   IV Contrast:  10 mL of gadobutrol injection (MULTI-DOSE)  IMAGE QUALITY:   Diagnostic  FINDINGS: BRAIN PARENCHYMA:  There is no discrete mass, mass effect or midline shift    Aside from a punctate nonspecific focus of gliosis in the right centrum semiovale on image 28 of series 6, there is no abnormal white matter signal identified  Brainstem and cerebellum demonstrate normal signal  There is no intracranial hemorrhage  There is no evidence of acute infarction and diffusion imaging is unremarkable  There is a punctate focus of enhancement within the right inferior gyrus rectus  This most 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  VENTRICLES:  Normal  SELLA AND PITUITARY GLAND:  Normal  ORBITS:  Normal  PARANASAL SINUSES:  There is moderate opacification of the ethmoid air cells  Scattered mucosal thickening and polypoid changes noted within the remaining paranasal sinuses  VASCULATURE:  Evaluation of the major intracranial vasculature demonstrates appropriate flow voids  CALVARIUM AND SKULL BASE:  Normal  EXTRACRANIAL SOFT TISSUES:  Normal      Impression: 1  No evidence of metastatic disease  2   Stable MRI of the brain with and incidental small focus of enhancement within the right inferior gyrus rectus likely 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  3   Persistent moderate ethmoid sinus disease, not significantly changed when compared to the prior study   Workstation performed: [id number]       Labs:   Lab Results   Component Value Date    WBC 6 85 01/23/2018    HGB 9 9 (L) 01/23/2018    HCT 32 0 (L) 01/23/2018    MCV 79 (L) 01/23/2018     01/23/2018     Lab Results   Component Value Date     01/04/2016    K 4 3 01/23/2018     01/23/2018    CO2 26 01/23/2018    ANIONGAP 8 01/04/2016    BUN 14 01/23/2018    CREATININE 1 21 01/23/2018    GLUCOSE 91 01/04/2016    CALCIUM 8 7 01/23/2018    AST 23 01/23/2018    ALT 40 01/23/2018    ALKPHOS 77 01/23/2018    PROT 8 2 01/04/2016    BILITOT 0 40 01/04/2016    EGFR 66 01/23/2018         No results found for: SPEP, UPEP    No results found for: PSA    No results found for: CEA    No results found for:     No results found for: AFP    No results found for: IRON, TIBC, FERRITIN    No results found for: [id number]      ROS: Review of Systems   Constitutional: Negative  HENT: Negative  No taste or smell   Eyes: Negative  Respiratory: Negative  Cardiovascular: Negative  Gastrointestinal: Negative  Endocrine: Negative  Genitourinary: Negative  Musculoskeletal: Negative  Skin: Negative  Allergic/Immunologic: Negative  Neurological: Negative  Hematological: Negative  Psychiatric/Behavioral: Negative  Current Medications: Reviewed  Allergies: Reviewed  PMH/FH/SH:  Reviewed      Physical Exam:    There is no height or weight on file to calculate BSA  Wt Readings from Last 3 Encounters:   10/11/18 109 kg (241 lb)   07/25/18 106 kg (234 lb 8 oz)   10/05/18 107 kg (235 lb)        Temp Readings from Last 3 Encounters:   10/11/18 98 3 °F (36 8 °C)   07/25/18 98 1 °F (36 7 °C)   04/18/18 (!) 97 4 °F (36 3 °C) (Tympanic)        BP Readings from Last 3 Encounters:   10/11/18 132/78   07/25/18 138/80   04/18/18 150/90         Pulse Readings from Last 3 Encounters:   10/11/18 74   07/25/18 83   04/18/18 80     @LASTSAO2(3)@      Physical Exam   Constitutional: He is oriented to person, place, and time  He appears well-developed and well-nourished  HENT:   Head: Normocephalic and atraumatic  Nose: Nose normal    Mouth/Throat: Oropharynx is clear and moist    Eyes: Pupils are equal, round, and reactive to light  Conjunctivae and EOM are normal    Neck: Normal range of motion  Neck supple  Cardiovascular: Normal rate, regular rhythm and normal heart sounds  Pulmonary/Chest: Effort normal and breath sounds normal    Abdominal: Soft  Bowel sounds are normal    Musculoskeletal: Normal range of motion  Neurological: He is alert and oriented to person, place, and time  He has normal reflexes  Skin: Skin is warm and dry     Psychiatric: He has a normal mood and affect  Judgment normal    Vitals reviewed  Goals and Barriers:  Current Goal: Prolong Survival from Cancer  Barriers: None  Patient's Capacity to Self Care:  Patient fully able to self care      Code Status: [unfilled]  Advance Directive and Living Will:      Power of :

## 2018-12-28 ENCOUNTER — OFFICE VISIT (OUTPATIENT)
Dept: HEMATOLOGY ONCOLOGY | Facility: CLINIC | Age: 58
End: 2018-12-28
Payer: COMMERCIAL

## 2018-12-28 VITALS
OXYGEN SATURATION: 92 % | HEIGHT: 74 IN | SYSTOLIC BLOOD PRESSURE: 126 MMHG | BODY MASS INDEX: 31.18 KG/M2 | RESPIRATION RATE: 14 BRPM | DIASTOLIC BLOOD PRESSURE: 80 MMHG | WEIGHT: 243 LBS | TEMPERATURE: 98.7 F | HEART RATE: 80 BPM

## 2018-12-28 DIAGNOSIS — C79.9 METASTATIC MELANOMA (HCC): ICD-10-CM

## 2018-12-28 DIAGNOSIS — C79.31 MALIGNANT MELANOMA METASTATIC TO BRAIN (HCC): Primary | ICD-10-CM

## 2018-12-28 PROCEDURE — 99214 OFFICE O/P EST MOD 30 MIN: CPT | Performed by: SPECIALIST

## 2018-12-28 NOTE — LETTER
December 28, 2018     Demetrio Robledo, [address] [id number]    Patient: Filemon Haque   YOB: 1960   Date of Visit: 12/28/2018       Dear Dr Ward Nicole: Thank you for referring Filemon Haque to me for evaluation  Below are my notes for this consultation  If you have questions, please do not hesitate to call me  I look forward to following your patient along with you  Sincerely,        Jimmy Lam MD        CC: Fabiana Bennett  [facility]  12/27/2018  7:34 AM  Sign at close encounter  Hematology/Oncology Outpatient Follow- up Note  Filemon Haque 60 y o  male MRN: @ Encounter: [id number]        Date:  12/27/2018        Assessment / Plan:    1  Metastatic melanoma to the brain, liver, and lung  The patient participated on the CA [id number] clinical trial where he receive Ipilimumab and Nivolumab combination followed by monotherapy with Nivolumab  He completed 39 cycles of Nivolumab on 12/27/17  Overall the patient is doing well  He had an MRI of the brain and CT scans on 12/21/18, which continues to show stable disease  His labs were reviewed  He was also seen by Abril Segura, our clinical trials nurse today as well  We will see him back in three months with repeat imaging and lab work as outlined by the clinical trial         Subjective:   CC: follow up regarding metastatic melanoma      HPI:    Mr Dipti Diaz is a 60year-old male who presented to [facility] on 11/9/15 with a cough and right flank pain  The patient had CT scans, which demonstrated what appeared to be diffuse metastatic disease with multiple pulmonary nodules, bulky mediastinal/hilar adenopathy, pleural based nodules, a right renal mass that measured 9 cm, retroperitoneal lymph nodes, and bone metastasis  A core biopsy of the right kidney was biopsied on 11/10/15 and was suspicious for malignant melanoma, HMB45 was positive   The BRAF mutation was tested at that time and was mutated  He did have an MRI of the brain on 11/24/15, which did show tiny punctate brain lesions (see radiology section for more details)  A PET/CT was obtained on 11/27/15 and confirmed diffuse metastatic disease, which was seen on the previous CT imaging  He had a fairly large right sided kidney mass  A core biopsy of the right kidney was obtained on 11/10/15 and was positive for malignant melanoma  He was also found to have metastatic disease to his brain  He completed three doses of Nivolumab/Ipilimumab on 2/23/16, he is s/p 3 doses  He did develop severe colitis and he was stared on high dose Prednisone on 2/23/16, he will start Nivolumab 3 mg/kg every 2 weeks starting on 4/25/16  Sarai Solitario completed two years of treatment on 12/27/17        Interval History:          Cancer Staging:  metastatic melanoma to the brain, lung, liver, and lymph nodes    BRAF: mutated    Previous Hematologic/ Oncologic History:    -11/10/15 biopsy of the right kidney: high grade malignant neoplasm with melanocytic differentiation  HMB45 positive    -trial ([id number]) Ipilimumab in combination with Nivolumab- first treatment was 1/4/16 with continuation of single agent Nivolumab since 4/25/2016, 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, re-started treatment on 3/30/17, completed two years of Nivolumab on 12/27/17    Current Hematologic/ Oncologic Treatment:    observation          Test Results:    Imaging: Ct Chest Abdomen Pelvis Wo Contrast    Result Date: 12/24/2018  Narrative: CT CHEST, ABDOMEN AND PELVIS WITHOUT IV CONTRAST INDICATION:   C43 9: Malignant melanoma of skin, unspecified  Reevaluate metastatic disease   COMPARISON: Multiple prior examinations, most recently October 5, 2018 TECHNIQUE: CT examination of the chest, abdomen and pelvis was performed without intravenous contrast   Axial, sagittal, and coronal 2D reformatted images were created from the source data and submitted for interpretation  Radiation dose length product (DLP) for this visit:  1078 mGy-cm   This examination, like all CT scans performed in the [facility], was performed utilizing techniques to minimize radiation dose exposure, including the use of iterative reconstruction and automated exposure control  Enteric contrast was administered  FINDINGS: RECIST target lesions: (F) 1  Left upper lung nodule: 5 x 4 mm image 36 of series 5, not significantly changed from prior examination  Previously 5 x 4 mm  (G) 2   Subcarinal lymph node: 12 x 7 mm on image 29 of series 2, not significantly changed from 11 x 7 mm on previous examination  (H) 3  Right retroperitoneal lower pole renal capsule lesion: Approximately 8 x 4 mm on image 71 of series 2, similar from 8 x 3 mm on previous examination  (I) 4  Left retroperitoneal lesion: Not measurable  Nontarget lesions: (K) 1   Stable pulmonary nodules/metastases  (L) 2  Right hepatic dome lesion: Not well visualized on this noncontrast exam  (M) 3   Stable sclerotic osseous metastases  (N) 4  Previously Hypervascular hepatic dome lesion not well evaluated on this noncontrast study  CHEST LUNGS:  Few pulmonary nodules as well as nodular densities along the major fissure in the left mid chest and subpleural right basilar nodular densities are similar from previous examination  Subpleural calcification in the right mid chest is unchanged  No new or suspicious pulmonary nodule or mass  PLEURA:  Unremarkable  HEART/GREAT VESSELS:  Unremarkable for patient's age  MEDIASTINUM AND BECKY:  Stable subcarinal lymph node, as above  No new adenopathy  CHEST WALL AND LOWER NECK:   Unremarkable   ABDOMEN LIVER/BILIARY TREE:  Questionable hypodensity in the posterior dome of the right hepatic lobe measuring 16 mm on image 49 of series 2, only visible with active "windowing of the image" may or may not represent a metastatic lesion but is not new, as the finding is retrospectively visible on multiple prior examinations and similar in size at least as far back as June 2017  No new pulmonary nodule or mass  GALLBLADDER:  No calcified gallstones  No pericholecystic inflammatory change  SPLEEN:  Unremarkable  PANCREAS:  Unremarkable  ADRENAL GLANDS:  Unremarkable  KIDNEYS/URETERS:  Unchanged right lower pole pericapsular soft tissue  Unchanged left hydroureteronephrosis  Stable tiny exophytic cyst at the right upper pole, and small partially exophytic cortical cyst at the right lower pole  STOMACH AND BOWEL:  Unremarkable  APPENDIX:  No findings to suggest appendicitis  ABDOMINOPELVIC CAVITY:  No ascites or free intraperitoneal air  No lymphadenopathy  VESSELS:  Unremarkable for patient's age  PELVIS REPRODUCTIVE ORGANS:  Unremarkable for patient's age  URINARY BLADDER:  Unremarkable  ABDOMINAL WALL/INGUINAL REGIONS:  Unremarkable  OSSEOUS STRUCTURES:  Unchanged chronic T12 compression deformity  Chronic bilateral L5 spondylolysis without spondylolisthesis  No destructive osseous lesions  Impression: No significant interval change in metastatic disease when compared to previous examinations  No new lesions identified in the chest, abdomen or pelvis to suggest new metastatic disease  Workstation performed: [id number]     Mri Brain W Wo Contrast    Result Date: 12/24/2018  Narrative: MRI BRAIN WITH AND WITHOUT CONTRAST INDICATION: C43 9: Malignant melanoma of skin, unspecified  History of stage IV melanoma, completion of therapy  Evaluate for metastatic disease  COMPARISON:  MRI of the brain from October 5, 2018  TECHNIQUE: Sagittal T1, axial T2, axial FLAIR, axial T1, axial [place], axial diffusion  Sagittal, axial and coronal T1 postcontrast   Axial BRAVO post contrast   IV Contrast:  10 mL of gadobutrol injection (MULTI-DOSE)  IMAGE QUALITY:   Diagnostic  FINDINGS: BRAIN PARENCHYMA:  There is no discrete mass, mass effect or midline shift    Aside from a punctate nonspecific focus of gliosis in the right centrum semiovale on image 28 of series 6, there is no abnormal white matter signal identified  Brainstem and cerebellum demonstrate normal signal  There is no intracranial hemorrhage  There is no evidence of acute infarction and diffusion imaging is unremarkable  There is a punctate focus of enhancement within the right inferior gyrus rectus  This most 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  VENTRICLES:  Normal  SELLA AND PITUITARY GLAND:  Normal  ORBITS:  Normal  PARANASAL SINUSES:  There is moderate opacification of the ethmoid air cells  Scattered mucosal thickening and polypoid changes noted within the remaining paranasal sinuses  VASCULATURE:  Evaluation of the major intracranial vasculature demonstrates appropriate flow voids  CALVARIUM AND SKULL BASE:  Normal  EXTRACRANIAL SOFT TISSUES:  Normal      Impression: 1  No evidence of metastatic disease  2   Stable MRI of the brain with and incidental small focus of enhancement within the right inferior gyrus rectus likely 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  3   Persistent moderate ethmoid sinus disease, not significantly changed when compared to the prior study   Workstation performed: [id number]       Labs:   Lab Results   Component Value Date    WBC 6 85 01/23/2018    HGB 9 9 (L) 01/23/2018    HCT 32 0 (L) 01/23/2018    MCV 79 (L) 01/23/2018     01/23/2018     Lab Results   Component Value Date     01/04/2016    K 4 3 01/23/2018     01/23/2018    CO2 26 01/23/2018    ANIONGAP 8 01/04/2016    BUN 14 01/23/2018    CREATININE 1 21 01/23/2018    GLUCOSE 91 01/04/2016    CALCIUM 8 7 01/23/2018    AST 23 01/23/2018    ALT 40 01/23/2018    ALKPHOS 77 01/23/2018    PROT 8 2 01/04/2016    BILITOT 0 40 01/04/2016    EGFR 66 01/23/2018         No results found for: SPEP, UPEP    No results found for: PSA    No results found for: CEA    No results found for:     No results found for: AFP    No results found for: IRON, TIBC, FERRITIN    No results found for: [id number]      ROS: Review of Systems      Current Medications: Reviewed  Allergies: Reviewed  PMH/FH/SH:  Reviewed      Physical Exam:    There is no height or weight on file to calculate BSA  Wt Readings from Last 3 Encounters:   10/11/18 109 kg (241 lb)   07/25/18 106 kg (234 lb 8 oz)   10/05/18 107 kg (235 lb)        Temp Readings from Last 3 Encounters:   10/11/18 98 3 °F (36 8 °C)   07/25/18 98 1 °F (36 7 °C)   04/18/18 (!) 97 4 °F (36 3 °C) (Tympanic)        BP Readings from Last 3 Encounters:   10/11/18 132/78   07/25/18 138/80   04/18/18 150/90         Pulse Readings from Last 3 Encounters:   10/11/18 74   07/25/18 83   04/18/18 80     @LASTSAO2(3)@      Physical Exam      Goals and Barriers:  Current Goal: Prolong Survival from Cancer  Barriers: None  Patient's Capacity to Self Care:  Patient fully able to self care      Code Status: [unfilled]  Advance Directive and Living Will:      Power of :

## 2019-01-24 ENCOUNTER — TELEPHONE (OUTPATIENT)
Dept: HEMATOLOGY ONCOLOGY | Facility: CLINIC | Age: 59
End: 2019-01-24

## 2019-01-24 NOTE — TELEPHONE ENCOUNTER
CALLING REGARDING THE CLINICAL TRAIL HE IS ON BEING CLOSED OUT  AND WANTING TO FOLLOWED BY HIS LOCAL HEMA/ONC

## 2019-03-18 ENCOUNTER — TELEPHONE (OUTPATIENT)
Dept: HEMATOLOGY ONCOLOGY | Facility: CLINIC | Age: 59
End: 2019-03-18

## 2019-03-18 NOTE — TELEPHONE ENCOUNTER
Call from patient asking for lab orders to be emailed to him or faxed to lab  Due to hipaa I faxed to [facility] in [place] ph# [phone] and fax# [phone]   LD,CMP & CBC & Diff from 12/21/18

## 2019-03-20 ENCOUNTER — TELEPHONE (OUTPATIENT)
Dept: HEMATOLOGY ONCOLOGY | Facility: CLINIC | Age: 59
End: 2019-03-20

## 2019-03-20 NOTE — TELEPHONE ENCOUNTER
Pt had appt with Urologist, Dr Schuyler Argueta and he informed the pt that he has a lump in his upper left ureter and would the urologist would like to do a procedure but would like to discuss this with you first before moving forward  If you can please call the pt back when you have time

## 2019-03-22 ENCOUNTER — TELEPHONE (OUTPATIENT)
Dept: HEMATOLOGY ONCOLOGY | Facility: CLINIC | Age: 59
End: 2019-03-22

## 2019-03-22 NOTE — TELEPHONE ENCOUNTER
I have tried to call the patient back today as he wanted to talk to me prior to his appointment next week  I left a message for him yesterday and again tried calling him today

## 2019-03-25 ENCOUNTER — TELEPHONE (OUTPATIENT)
Dept: HEMATOLOGY ONCOLOGY | Facility: CLINIC | Age: 59
End: 2019-03-25

## 2019-03-27 NOTE — PROGRESS NOTES
Hematology/Oncology Outpatient Follow- up Note  Keshia Chavez 60 y o  male MRN: @ Encounter: [id number]        Date:  3/27/2019        Assessment / Plan:    1  Metastatic melanoma to the brain, liver, and lung  The patient participated on the [place] 205539 clinical trial where he receive Ipilimumab and Nivolumab combination followed by monotherapy with Nivolumab  He completed 39 cycles of Nivolumab on 12/27/17  Overall the patient is doing well  He had an MRI of the brain and CT scans on 3/19/19, which continues to show stable disease  His RUTH nodule has decreased in size  He did have slight left ureteral distension, that is of unknown significance  He has spoken with a urologist regarding this  His labs were reviewed  He will follow up with us in three months with a CT of the chest, abdomen, and pelvis, MRI of the brain, and labs  Subjective:   CC: follow up regarding metastatic melanoma      HPI:    Mr Summer Pineda is a 53-year-old male who presented to [facility] [place] on 11/9/15 with a cough and right flank pain  The patient had CT scans, which demonstrated what appeared to be diffuse metastatic disease with multiple pulmonary nodules, bulky mediastinal/hilar adenopathy, pleural based nodules, a right renal mass that measured 9 cm, retroperitoneal lymph nodes, and bone metastasis  A core biopsy of the right kidney was biopsied on 11/10/15 and was suspicious for malignant melanoma, HMB45 was positive  The BRAF mutation was tested at that time and was mutated  He did have an MRI of the brain on 11/24/15, which did show tiny punctate brain lesions (see radiology section for more details)  A PET/CT was obtained on 11/27/15 and confirmed diffuse metastatic disease, which was seen on the previous CT imaging  He had a fairly large right sided kidney mass  A core biopsy of the right kidney was obtained on 11/10/15 and was positive for malignant melanoma   He was also found to have metastatic disease to his brain  He completed three doses of Nivolumab/Ipilimumab on 2/23/16, he is s/p 3 doses  He did develop severe colitis and he was stared on high dose Prednisone on 2/23/16, he will start Nivolumab 3 mg/kg every 2 weeks starting on 4/25/16  Tasneem Oneal completed two years of treatment on 12/27/17      Interval History:    Overall the patient is doing well  He has a good energy level with an ECOG performance status of zero  He has a good appetite and his weight has been stable  His taste, smell, and hearing continue to be altered since receiving immunotherapy and has been stable  He otherwise denies fevers, chills, CP, SOb, N/V, diarrhea, all other ROS are unremarkable  PFSH was reviewed  Cancer Staging:  metastatic melanoma to the brain, lung, liver, and lymph nodes      BRAF: mutated     Previous Hematologic/ Oncologic History:    -11/10/15 biopsy of the right kidney: high grade malignant neoplasm with melanocytic differentiation  HMB45 positive    -trial ([id number]) Ipilimumab in combination with Nivolumab- first treatment was 1/4/16 with continuation of single agent Nivolumab since 4/25/2016, 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, re-started treatment on 3/30/17, completed two years of Nivolumab on 12/27/17      Current Hematologic/ Oncologic Treatment:    observation          Test Results:    Imaging: No results found      Labs:   Lab Results   Component Value Date    WBC 6 85 01/23/2018    HGB 9 9 (L) 01/23/2018    HCT 32 0 (L) 01/23/2018    MCV 79 (L) 01/23/2018     01/23/2018     Lab Results   Component Value Date     01/04/2016    K 4 3 01/23/2018     01/23/2018    CO2 26 01/23/2018    ANIONGAP 8 01/04/2016    BUN 14 01/23/2018    CREATININE 1 21 01/23/2018    GLUCOSE 91 01/04/2016    CALCIUM 8 7 01/23/2018    AST 23 01/23/2018    ALT 40 01/23/2018    ALKPHOS 77 01/23/2018    PROT 8 2 01/04/2016    BILITOT 0 40 01/04/2016    EGFR 66 01/23/2018         No results found for: SPEP, UPEP    No results found for: PSA    No results found for: CEA    No results found for:     No results found for: AFP    No results found for: IRON, TIBC, FERRITIN    No results found for: [id number]      ROS: Review of Systems   Constitutional: Negative  HENT: Negative  Sense of smell, taste, and hearing altered and are stable   Eyes: Negative  Respiratory: Negative  Cardiovascular: Negative  Gastrointestinal: Negative  Endocrine: Negative  Genitourinary: Negative  Musculoskeletal: Negative  Skin: Negative  Allergic/Immunologic: Negative  Neurological: Negative  Hematological: Negative  Psychiatric/Behavioral: Negative  Current Medications: Reviewed  Allergies: Reviewed  PMH/FH/SH:  Reviewed      Physical Exam:    There is no height or weight on file to calculate BSA  Wt Readings from Last 3 Encounters:   12/28/18 110 kg (243 lb)   10/11/18 109 kg (241 lb)   07/25/18 106 kg (234 lb 8 oz)        Temp Readings from Last 3 Encounters:   12/28/18 98 7 °F (37 1 °C)   10/11/18 98 3 °F (36 8 °C)   07/25/18 98 1 °F (36 7 °C)        BP Readings from Last 3 Encounters:   12/28/18 126/80   10/11/18 132/78   07/25/18 138/80         Pulse Readings from Last 3 Encounters:   12/28/18 80   10/11/18 74   07/25/18 83     @LASTSAO2(3)@      Physical Exam   Constitutional: He is oriented to person, place, and time  He appears well-developed and well-nourished  HENT:   Head: Normocephalic and atraumatic  Nose: Nose normal    Mouth/Throat: Oropharynx is clear and moist    Eyes: Pupils are equal, round, and reactive to light  Conjunctivae and EOM are normal    Neck: Normal range of motion  Neck supple  Cardiovascular: Normal rate, regular rhythm and normal heart sounds  Pulmonary/Chest: Effort normal and breath sounds normal    Abdominal: Soft  Bowel sounds are normal    Musculoskeletal: Normal range of motion     Neurological: He is alert and oriented to person, place, and time  He has normal reflexes  Skin: Skin is warm and dry  Psychiatric: He has a normal mood and affect  Judgment normal    Vitals reviewed  Goals and Barriers:  Current Goal: Prolong Survival from Cancer  Barriers: None  Patient's Capacity to Self Care:  Patient fully able to self care      Code Status: [unfilled]  Advance Directive and Living Will:      Power of :

## 2019-03-28 ENCOUNTER — OFFICE VISIT (OUTPATIENT)
Dept: HEMATOLOGY ONCOLOGY | Facility: CLINIC | Age: 59
End: 2019-03-28
Payer: COMMERCIAL

## 2019-03-28 VITALS
WEIGHT: 239 LBS | SYSTOLIC BLOOD PRESSURE: 152 MMHG | HEIGHT: 73 IN | OXYGEN SATURATION: 98 % | TEMPERATURE: 97.8 F | DIASTOLIC BLOOD PRESSURE: 86 MMHG | BODY MASS INDEX: 31.68 KG/M2 | RESPIRATION RATE: 16 BRPM | HEART RATE: 108 BPM

## 2019-03-28 DIAGNOSIS — C79.9 METASTATIC MELANOMA (HCC): ICD-10-CM

## 2019-03-28 DIAGNOSIS — C79.31 MALIGNANT MELANOMA METASTATIC TO BRAIN (HCC): Primary | ICD-10-CM

## 2019-03-28 PROCEDURE — 99214 OFFICE O/P EST MOD 30 MIN: CPT | Performed by: SPECIALIST

## 2019-03-28 NOTE — LETTER
March 28, 2019     Britany Lopez, [address] [id number]    Patient: Juan A Avelar   YOB: 1960   Date of Visit: 3/28/2019       Dear Dr Jerry Neville: Thank you for referring Juan A Avelar to me for evaluation  Below are my notes for this consultation  If you have questions, please do not hesitate to call me  I look forward to following your patient along with you  Sincerely,        Katie Blanchard MD        CC: No Recipients  Chloe Ornelas PA-C  3/28/2019 10:54 AM  Sign at close encounter  Hematology/Oncology Outpatient Follow- up Note  Juan A Avelar 60 y o  male MRN: @ Encounter: [id number]        Date:  3/27/2019        Assessment / Plan:    1  Metastatic melanoma to the brain, liver, and lung  The patient participated on the [place] [id number] clinical trial where he receive Ipilimumab and Nivolumab combination followed by monotherapy with Nivolumab  He completed 39 cycles of Nivolumab on 12/27/17  Overall the patient is doing well  He had an MRI of the brain and CT scans on 3/19/19, which continues to show stable disease  His RUTH nodule has decreased in size  He did have slight left ureteral distension, that is of unknown significance  He has spoken with a urologist regarding this  His labs were reviewed  He will follow up with us in three months with a CT of the chest, abdomen, and pelvis, MRI of the brain, and labs  Subjective:   CC: follow up regarding metastatic melanoma      HPI:    Mr Amish Riggs is a 50-year-old male who presented to [facility] on 11/9/15 with a cough and right flank pain  The patient had CT scans, which demonstrated what appeared to be diffuse metastatic disease with multiple pulmonary nodules, bulky mediastinal/hilar adenopathy, pleural based nodules, a right renal mass that measured 9 cm, retroperitoneal lymph nodes, and bone metastasis   A core biopsy of the right kidney was biopsied on 11/10/15 and was suspicious for malignant melanoma, HMB45 was positive  The BRAF mutation was tested at that time and was mutated  He did have an MRI of the brain on 11/24/15, which did show tiny punctate brain lesions (see radiology section for more details)  A PET/CT was obtained on 11/27/15 and confirmed diffuse metastatic disease, which was seen on the previous CT imaging  He had a fairly large right sided kidney mass  A core biopsy of the right kidney was obtained on 11/10/15 and was positive for malignant melanoma  He was also found to have metastatic disease to his brain  He completed three doses of Nivolumab/Ipilimumab on 2/23/16, he is s/p 3 doses  He did develop severe colitis and he was stared on high dose Prednisone on 2/23/16, he will start Nivolumab 3 mg/kg every 2 weeks starting on 4/25/16  [facility] completed two years of treatment on 12/27/17      Interval History:    Overall the patient is doing well  He has a good energy level with an ECOG performance status of zero  He has a good appetite and his weight has been stable  His taste, smell, and hearing continue to be altered since receiving immunotherapy and has been stable  He otherwise denies fevers, chills, CP, SOb, N/V, diarrhea, all other ROS are unremarkable  PFSH was reviewed  Cancer Staging:  metastatic melanoma to the brain, lung, liver, and lymph nodes      BRAF: mutated     Previous Hematologic/ Oncologic History:    -11/10/15 biopsy of the right kidney: high grade malignant neoplasm with melanocytic differentiation  HMB45 positive    -trial ([id number]) Ipilimumab in combination with Nivolumab- first treatment was 1/4/16 with continuation of single agent Nivolumab since 4/25/2016, 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, re-started treatment on 3/30/17, completed two years of Nivolumab on 12/27/17      Current Hematologic/ Oncologic Treatment:    observation          Test Results:    Imaging: No results found      Labs:   Lab Results   Component Value Date    WBC 6 85 01/23/2018    HGB 9 9 (L) 01/23/2018    HCT 32 0 (L) 01/23/2018    MCV 79 (L) 01/23/2018     01/23/2018     Lab Results   Component Value Date     01/04/2016    K 4 3 01/23/2018     01/23/2018    CO2 26 01/23/2018    ANIONGAP 8 01/04/2016    BUN 14 01/23/2018    CREATININE 1 21 01/23/2018    GLUCOSE 91 01/04/2016    CALCIUM 8 7 01/23/2018    AST 23 01/23/2018    ALT 40 01/23/2018    ALKPHOS 77 01/23/2018    PROT 8 2 01/04/2016    BILITOT 0 40 01/04/2016    EGFR 66 01/23/2018         No results found for: SPEP, UPEP    No results found for: PSA    No results found for: CEA    No results found for:     No results found for: AFP    No results found for: IRON, TIBC, FERRITIN    No results found for: [id number]      ROS: Review of Systems   Constitutional: Negative  HENT: Negative  Sense of smell, taste, and hearing altered and are stable   Eyes: Negative  Respiratory: Negative  Cardiovascular: Negative  Gastrointestinal: Negative  Endocrine: Negative  Genitourinary: Negative  Musculoskeletal: Negative  Skin: Negative  Allergic/Immunologic: Negative  Neurological: Negative  Hematological: Negative  Psychiatric/Behavioral: Negative  Current Medications: Reviewed  Allergies: Reviewed  PMH/FH/SH:  Reviewed      Physical Exam:    There is no height or weight on file to calculate BSA      Wt Readings from Last 3 Encounters:   12/28/18 110 kg (243 lb)   10/11/18 109 kg (241 lb)   07/25/18 106 kg (234 lb 8 oz)        Temp Readings from Last 3 Encounters:   12/28/18 98 7 °F (37 1 °C)   10/11/18 98 3 °F (36 8 °C)   07/25/18 98 1 °F (36 7 °C)        BP Readings from Last 3 Encounters:   12/28/18 126/80   10/11/18 132/78   07/25/18 138/80         Pulse Readings from Last 3 Encounters:   12/28/18 80   10/11/18 74   07/25/18 83     @LASTSAO2(3)@      Physical Exam   Constitutional: He is oriented to person, place, and time  He appears well-developed and well-nourished  HENT:   Head: Normocephalic and atraumatic  Nose: Nose normal    Mouth/Throat: Oropharynx is clear and moist    Eyes: Pupils are equal, round, and reactive to light  Conjunctivae and EOM are normal    Neck: Normal range of motion  Neck supple  Cardiovascular: Normal rate, regular rhythm and normal heart sounds  Pulmonary/Chest: Effort normal and breath sounds normal    Abdominal: Soft  Bowel sounds are normal    Musculoskeletal: Normal range of motion  Neurological: He is alert and oriented to person, place, and time  He has normal reflexes  Skin: Skin is warm and dry  Psychiatric: He has a normal mood and affect  Judgment normal    Vitals reviewed  Goals and Barriers:  Current Goal: Prolong Survival from Cancer  Barriers: None  Patient's Capacity to Self Care:  Patient fully able to self care      Code Status: [unfilled]  Advance Directive and Living Will:      Power of :

## 2019-06-27 DIAGNOSIS — C79.31 BRAIN METASTASES (HCC): Primary | ICD-10-CM

## 2019-07-03 ENCOUNTER — TELEPHONE (OUTPATIENT)
Dept: HEMATOLOGY ONCOLOGY | Facility: CLINIC | Age: 59
End: 2019-07-03

## 2019-07-03 NOTE — TELEPHONE ENCOUNTER
Left message for Stacey Pena that Dr Calixto Hinds will not be seeing patients this Friday   I moved his appt to Monday, 7/8/19 @ 9am with Dr Veronica Webster

## 2019-07-08 ENCOUNTER — OFFICE VISIT (OUTPATIENT)
Dept: HEMATOLOGY ONCOLOGY | Facility: CLINIC | Age: 59
End: 2019-07-08
Payer: COMMERCIAL

## 2019-07-08 VITALS
HEIGHT: 74 IN | HEART RATE: 88 BPM | SYSTOLIC BLOOD PRESSURE: 148 MMHG | BODY MASS INDEX: 30.67 KG/M2 | TEMPERATURE: 98.7 F | WEIGHT: 239 LBS | RESPIRATION RATE: 14 BRPM | DIASTOLIC BLOOD PRESSURE: 78 MMHG

## 2019-07-08 DIAGNOSIS — C79.9 METASTATIC MELANOMA (HCC): Primary | ICD-10-CM

## 2019-07-08 DIAGNOSIS — C79.31 MALIGNANT MELANOMA METASTATIC TO BRAIN (HCC): ICD-10-CM

## 2019-07-08 DIAGNOSIS — D50.9 IRON DEFICIENCY ANEMIA, UNSPECIFIED IRON DEFICIENCY ANEMIA TYPE: ICD-10-CM

## 2019-07-08 PROCEDURE — 99214 OFFICE O/P EST MOD 30 MIN: CPT | Performed by: INTERNAL MEDICINE

## 2019-07-08 NOTE — PROGRESS NOTES
HEMATOLOGY / ONCOLOGY CLINIC NOTE    Primary Care Provider: Aysha Vivas MD  Referring Provider: Michel Durant  MRN: [id number]  : 1960    Reason for Encounter:  Chief Complaint   Patient presents with    Follow-up         History of Hematology / Oncology Illness:     Aime Mackey is a 60 y o  male who came in  to establish care with oncology      1, metastatic melanoma, with diffuse Mets to brain, liver, lung    - diagnosed in , found having diffuse metastatic disease, [place] [id number] clinical trial where he receive Ipilimumab and Nivolumab combination followed by monotherapy with Nivolumab  He completed 39 cycles of Nivolumab on 17      - 2017, treatment was on hold due to grade 3 colitis, in combination of anemia  Patient was since then on observation, with CT chest abdomen pelvic, brain MRI every 3 months  - most recent scan:  2019, result scanned in media, stable      2, iron def anemia   - last iron panel in 2018  Assessment / Plan:       1  Metastatic melanoma ([address] )  - continue observation, reviewed labs and CT chest abdomen pelvic and brain MRI with patient, the 5 cm left upper lobe lung lesion is stable, intra-abdominal lymph node is 12 cm stable, no new findings in brain MRI         - MRI brain w wo contrast; Future  - CBC and differential  - Comprehensive metabolic panel  - LD (LDH), Body Fluid; Future  - Iron Panel; Future  - Occult Blood, Fecal Immunochemical; Future    2  Malignant melanoma metastatic to brain [facility])    - MRI brain w wo contrast; Future      3  Iron deficiency anemia, unspecified iron deficiency anemia type  - macrocytic anemia, current hemoglobin 2019 showed hemoglobin of 11 2 MCV of 79   -   last screening colonoscopy 8 years ago, no major findings  His lowest hemoglobin was 9 7 in 2018  Patient has not been on supplement  I suspect patient's anemia is due to immunotherapy related colitis, in the process of slow recovering now    I would advise to take oral iron supplement, will check fecal occult blood to be sure, if positive patient will follow with GI       - Iron Panel; Future  - Occult Blood, Fecal Immunochemical; Future      Made patient aware regarding side effects of chemotherapy, including but not limited to fatigue cytopenia, increased risk for infection, potential kidney, liver injuries neuropathies et al    Made patient aware to call MD or go to ED for any fever,  Chills, bleeding, easy bruise, unhealed wound, chest pain, abdominal pain et al                   25     minutes were spent face to face with patient with greater than 50% of the time spent in counseling or coordination of care including discussions of treatment instructions  All of the patient's questions were answered to their satisfactory during this discussion  Advised pt to call if there is any further questions  Interval History:     7/8/2019:  Patient came in for follow-up, reported doing well  No bleeding, body weight is stable, no new lumps bumps  Problem list:     Patient Active Problem List   Diagnosis    Metastatic melanoma ([address] [address] )    Malignant melanoma metastatic to brain ([address] [address] )         PHYSICIAL EXAMINATION:     Vital Signs:   [unfilled]  Body mass index is 31 1 kg/m²  Body surface area is 2 33 meters squared  No major finding on physical examination  GEN: Alert, awake oriented x3, in no acute distress  HEENT- No pallor, icterus, cyanosis, no oral mucosal lesions,   LAD - no palpable cervical, clavicle, axillary, inguinal LAD  Heart- normal S1 S2, regular rate and rhythm, No murmur, rubs  Lungs- decreased breathing sound bilateral    Abdomen- soft, Non tender, bowel sounds present  Extremities- No cyanosis, clubbing, edema  Neuro- No focal neurological deficit           PAST MEDICAL HISTORY:   has a past medical history of Brain metastasis ([address] [address] ) and Melanoma ([address] [address] )      PAST SURGICAL HISTORY:   has a past surgical history that includes Rotator cuff repair; Posterior fusion cervical spine; Carpal tunnel release; Appendectomy; and Joint replacement  CURRENT MEDICATIONS:   Current Outpatient Medications   Medication Sig Dispense Refill    levothyroxine 200 mcg tablet Take 200 mcg by mouth daily       No current facility-administered medications for this visit  [unfilled]    SOCIAL HISTORY:   reports that he has never smoked  He has never used smokeless tobacco  He reports that he does not drink alcohol or use drugs  FAMILY HISTORY:  family history is not on file  ALLERGIES:  is allergic to iodinated diagnostic agents and penicillins  REVIEW OF SYSTEMS:  Please note that a 14-point review of systems was performed to include Constitutional, HEENT, Respiratory, CVS, GI, , Musculoskeletal, Integumentary, Neurologic, Rheumatologic, Endocrinologic, Psychiatric, Lymphatic, and Hematologic/Oncologic systems were reviewed and are negative unless otherwise stated in HPI  Positive and negative findings pertinent to this evaluation are incorporated into the history of present illness              LAB:  Lab Results   Component Value Date    WBC 6 85 01/23/2018    HGB 9 9 (L) 01/23/2018    HCT 32 0 (L) 01/23/2018    MCV 79 (L) 01/23/2018     01/23/2018     Lab Results   Component Value Date     01/04/2016    SODIUM 141 01/23/2018    K 4 3 01/23/2018     01/23/2018    CO2 26 01/23/2018    ANIONGAP 8 01/04/2016    AGAP 8 01/23/2018    BUN 14 01/23/2018    CREATININE 1 21 01/23/2018    GLUC 99 01/23/2018    CALCIUM 8 7 01/23/2018    AST 23 01/23/2018    ALT 40 01/23/2018    ALKPHOS 77 01/23/2018    PROT 8 2 01/04/2016    TP 6 1 (L) 01/23/2018    BILITOT 0 40 01/04/2016    TBILI 0 20 01/23/2018    EGFR 66 01/23/2018       CBC with diff:       Invalid input(s):  RBC, TOTALCELLSCOUNTED, SEGS%, GRANS%, LYMPHS%, EOS%, BASO%, ABNEUT, ABGRANS, ABLYMPHS, ABMOMOS, ABEOS, ABBASO    CMP:      Invalid input(s): ALBUMIN    IMAGING:  MRI brain w wo contrast    (Results Pending)     No results found

## 2019-09-25 ENCOUNTER — TELEPHONE (OUTPATIENT)
Dept: HEMATOLOGY ONCOLOGY | Facility: CLINIC | Age: 59
End: 2019-09-25

## 2019-09-25 NOTE — TELEPHONE ENCOUNTER
Called from [facility] stating patient will need to have lab work prior to MRI  Patient will need to have A creatine drawn  After looking into the patients chart I was able to see Dr Harris wanted the patient to have lab work done along with MRI and a Krystyna Naik  I reached out to the patient leaving a message stating he will need to have blood work before he can have the MRI / CT scan   I will await a call back to advise the patient

## 2019-10-07 ENCOUNTER — TELEPHONE (OUTPATIENT)
Dept: HEMATOLOGY ONCOLOGY | Facility: CLINIC | Age: 59
End: 2019-10-07

## 2019-10-07 DIAGNOSIS — C79.9 METASTATIC MELANOMA (HCC): Primary | ICD-10-CM

## 2019-10-07 NOTE — TELEPHONE ENCOUNTER
Diagnostic center called stating that the lab for LD, Blood was put in as LD, Body Fluid  I placed new order and faxed over the order to [phone] per the rep request  Instructed them to call the office if any additional information is required

## 2019-10-09 ENCOUNTER — TELEPHONE (OUTPATIENT)
Dept: HEMATOLOGY ONCOLOGY | Facility: CLINIC | Age: 59
End: 2019-10-09

## 2019-10-09 DIAGNOSIS — Z91.041 ALLERGY TO IODINATED CONTRAST: Primary | ICD-10-CM

## 2019-10-09 RX ORDER — METHYLPREDNISOLONE 32 MG/1
32 TABLET ORAL 2 TIMES DAILY
Qty: 2 TABLET | Refills: 0 | Status: CANCELLED | OUTPATIENT
Start: 2019-10-09

## 2019-10-09 NOTE — TELEPHONE ENCOUNTER
Rise [employer] from [address] called and stated pt came in for ct scan and he was found to have an allergy to the iodine contrast  She stated pt needs to be pre medicated if Dr Karson Osman wanted pt to get contrast

## 2019-10-09 NOTE — TELEPHONE ENCOUNTER
Alfonso Nobles from [facility] called and asked that we delete the last message that she left  Patient does not need the dye

## 2019-10-15 ENCOUNTER — TELEPHONE (OUTPATIENT)
Dept: HEMATOLOGY ONCOLOGY | Facility: CLINIC | Age: 59
End: 2019-10-15

## 2019-10-15 ENCOUNTER — OFFICE VISIT (OUTPATIENT)
Dept: HEMATOLOGY ONCOLOGY | Facility: CLINIC | Age: 59
End: 2019-10-15
Payer: COMMERCIAL

## 2019-10-15 VITALS
RESPIRATION RATE: 16 BRPM | OXYGEN SATURATION: 95 % | HEIGHT: 74 IN | TEMPERATURE: 98.2 F | SYSTOLIC BLOOD PRESSURE: 122 MMHG | WEIGHT: 246.5 LBS | BODY MASS INDEX: 31.63 KG/M2 | HEART RATE: 94 BPM | DIASTOLIC BLOOD PRESSURE: 76 MMHG

## 2019-10-15 DIAGNOSIS — C79.9 METASTATIC MELANOMA (HCC): ICD-10-CM

## 2019-10-15 DIAGNOSIS — C79.31 MALIGNANT MELANOMA METASTATIC TO BRAIN (HCC): Primary | ICD-10-CM

## 2019-10-15 DIAGNOSIS — D64.9 ANEMIA, UNSPECIFIED TYPE: ICD-10-CM

## 2019-10-15 PROCEDURE — 99214 OFFICE O/P EST MOD 30 MIN: CPT | Performed by: INTERNAL MEDICINE

## 2019-10-15 RX ORDER — LEVOTHYROXINE SODIUM 0.15 MG/1
150 TABLET ORAL DAILY
COMMUNITY
Start: 2019-10-14 | End: 2020-10-13

## 2019-10-15 RX ORDER — FERROUS SULFATE TAB EC 324 MG (65 MG FE EQUIVALENT) 324 (65 FE) MG
65 TABLET DELAYED RESPONSE ORAL DAILY
COMMUNITY
End: 2020-11-03 | Stop reason: ALTCHOICE

## 2019-10-15 RX ORDER — ATORVASTATIN CALCIUM 40 MG/1
40 TABLET, FILM COATED ORAL DAILY
COMMUNITY
Start: 2019-10-14 | End: 2020-11-03 | Stop reason: ALTCHOICE

## 2019-10-16 NOTE — PROGRESS NOTES
HEMATOLOGY / ONCOLOGY CLINIC NOTE    Primary Care Provider: Amanda Scruggs MD  Referring Provider:    MRN: [id number]  : 1960    Reason for Encounter:    Chief Complaint   Patient presents with    Follow-up         History of Hematology / Oncology Illness:     Ravinder Saeed is a 60 y o  male who came in  to establish care with oncology      1, metastatic melanoma, with diffuse Mets to brain, liver, lung, on surveillance    - diagnosed in , found having diffuse metastatic disease, [place] [address] clinical trial where he receive Ipilimumab and Nivolumab combination followed by monotherapy with Nivolumab  He completed 39 cycles of Nivolumab on 17      - 2017, treatment was on hold due to grade 3 colitis, in combination of anemia  Patient was since then on observation, with CT chest abdomen pelvic, brain MRI every 3 months  On surveillance since 2017, no evidence for cancer recurrence  - most recent scan:  2019, result scanned in media, stable      2, iron def anemia   - last iron panel in 2018  Assessment / Plan:       1  Metastatic [facility] ([facility] )    - has been 4 years since initial diagnosis, has been 2 years after finishing adjuvant immunotherapy  Reviewed labs and imaging study with patient, there is no evidence for cancer recurrence  Will continue observation  Will follow patient 6 months         - CBC and differential; Standing  - Comprehensive metabolic panel; Standing  - LD,Blood; Standing  - MRI brain w wo contrast; Future  - CT abdomen pelvis w contrast; Future  - CBC and differential  - Comprehensive metabolic panel  - LD,Blood      2  Metastatic melanoma ([facility] )     - MRI brain w wo contrast; Future  - CBC and differential  - Comprehensive metabolic panel  - LD (LDH), Body Fluid; Future  - Iron Panel; Future  - Occult Blood, Fecal Immunochemical; Future      3, microcytic anemia  - mild macrocytic anemia, hemoglobin is increasing, will continue monitor    Made patient aware about the importance of screening colonoscopy  Patient will discuss with PCP  Made patient aware regarding side effects of chemotherapy, including but not limited to fatigue cytopenia, increased risk for infection, potential kidney, liver injuries neuropathies et al    Made patient aware to call MD or go to ED for any fever,  Chills, bleeding, easy bruise, unhealed wound, chest pain, abdominal pain et al                   25     minutes were spent face to face with patient with greater than 50% of the time spent in counseling or coordination of care including discussions of treatment instructions  All of the patient's questions were answered to their satisfactory during this discussion  Advised pt to call if there is any further questions  Interval History:     7/8/2019:  Patient came in for follow-up, reported doing well  No bleeding, body weight is stable, no new lumps bumps  10/16/2019 :  Came in for follow-up  Reported doing well, no bleeding body weight is stable  No Skin lesion  Problem list:       Patient Active Problem List   Diagnosis    Metastatic melanoma ([place] [address] )    Malignant melanoma metastatic to brain ([place] [address] )         PHYSICIAL EXAMINATION:     Vital Signs:   [unfilled]  Body mass index is 32 08 kg/m²  Body surface area is 2 37 meters squared  No major finding of physical examination  GEN: Alert, awake oriented x3, in no acute distress  HEENT- No pallor, icterus, cyanosis, no oral mucosal lesions,   LAD - no palpable cervical, clavicle, axillary, inguinal LAD  Heart- normal S1 S2, regular rate and rhythm, No murmur, rubs  Lungs- decreased breathing sound bilateral    Abdomen- soft, Non tender, bowel sounds present  Extremities- No cyanosis, clubbing, edema  Neuro- No focal neurological deficit           PAST MEDICAL HISTORY:   has a past medical history of Brain metastasis ([place] [address] ) and Melanoma ([place] [address] )      PAST SURGICAL HISTORY:   has a past surgical history that includes Rotator cuff repair; Posterior fusion cervical spine; Carpal tunnel release; Appendectomy; and Joint replacement  CURRENT MEDICATIONS:     Current Outpatient Medications   Medication Sig Dispense Refill    atorvastatin (LIPITOR) 40 mg tablet Take 40 mg by mouth daily      ferrous sulfate 324 (65 Fe) mg Take 65 mg by mouth daily      levothyroxine 150 mcg tablet Take 150 mcg by mouth daily       No current facility-administered medications for this visit  [unfilled]    SOCIAL HISTORY:   reports that he has never smoked  He has never used smokeless tobacco  He reports that he does not drink alcohol or use drugs  FAMILY HISTORY:  family history is not on file  ALLERGIES:  is allergic to iodinated diagnostic agents and penicillins  REVIEW OF SYSTEMS:  Please note that a 14-point review of systems was performed to include Constitutional, HEENT, Respiratory, CVS, GI, , Musculoskeletal, Integumentary, Neurologic, Rheumatologic, Endocrinologic, Psychiatric, Lymphatic, and Hematologic/Oncologic systems were reviewed and are negative unless otherwise stated in HPI  Positive and negative findings pertinent to this evaluation are incorporated into the history of present illness              LAB:    Lab Results   Component Value Date    WBC 6 85 01/23/2018    HGB 9 9 (L) 01/23/2018    HCT 32 0 (L) 01/23/2018    MCV 79 (L) 01/23/2018     01/23/2018       Lab Results   Component Value Date     01/04/2016    SODIUM 141 01/23/2018    K 4 3 01/23/2018     01/23/2018    CO2 26 01/23/2018    ANIONGAP 8 01/04/2016    AGAP 8 01/23/2018    BUN 14 01/23/2018    CREATININE 1 21 01/23/2018    GLUC 99 01/23/2018    CALCIUM 8 7 01/23/2018    AST 23 01/23/2018    ALT 40 01/23/2018    ALKPHOS 77 01/23/2018    PROT 8 2 01/04/2016    TP 6 1 (L) 01/23/2018    BILITOT 0 40 01/04/2016    TBILI 0 20 01/23/2018    EGFR 66 01/23/2018       CBC with diff:       Invalid input(s):  RBC, TOTALCELLSCOUNTED, SEGS%, GRANS%, LYMPHS%, EOS%, BASO%, ABNEUT, ABGRANS, ABLYMPHS, ABMOMOS, ABEOS, ABBASO    CMP:      Invalid input(s): ALBUMIN    IMAGING:    MRI brain w wo contrast    (Results Pending)   CT abdomen pelvis w contrast    (Results Pending)     No results found

## 2019-11-04 ENCOUNTER — TELEPHONE (OUTPATIENT)
Dept: HEMATOLOGY ONCOLOGY | Facility: CLINIC | Age: 59
End: 2019-11-04

## 2019-11-04 NOTE — TELEPHONE ENCOUNTER
Patient's wife called and wants her intermittent FMLA filled out  She will fax the forms to [facility] and I will email to you

## 2019-11-21 ENCOUNTER — TELEPHONE (OUTPATIENT)
Dept: HEMATOLOGY ONCOLOGY | Facility: CLINIC | Age: 59
End: 2019-11-21

## 2019-11-21 ENCOUNTER — TELEPHONE (OUTPATIENT)
Dept: OTHER | Facility: HOSPITAL | Age: 59
End: 2019-11-21

## 2019-11-21 NOTE — TELEPHONE ENCOUNTER
Received a Fax asking to call the patient's wife Alfonzo Castro regarding FMLA paperwork  I called and left a VM to call me back

## 2019-11-21 NOTE — TELEPHONE ENCOUNTER
We do not have FMLA papers for this patient  Please note, patient is presently on surveillance and not active treatment  Dr Bret Jha does not authorize FMLA papers unless on active treatment

## 2020-03-30 ENCOUNTER — TELEPHONE (OUTPATIENT)
Dept: HEMATOLOGY ONCOLOGY | Facility: CLINIC | Age: 60
End: 2020-03-30

## 2020-03-30 NOTE — TELEPHONE ENCOUNTER
Brie Ren from [place] is requesting MRI order be faxed to [phone] and a good call back # is [phone]

## 2020-03-31 NOTE — TELEPHONE ENCOUNTER
Amari Montemayor from [place] is requesting CT w/ contrast order be faxed to [phone] and a good call back # is [phone]

## 2020-04-13 ENCOUNTER — TELEPHONE (OUTPATIENT)
Dept: HEMATOLOGY ONCOLOGY | Facility: CLINIC | Age: 60
End: 2020-04-13

## 2020-04-14 ENCOUNTER — TELEPHONE (OUTPATIENT)
Dept: HEMATOLOGY ONCOLOGY | Facility: CLINIC | Age: 60
End: 2020-04-14

## 2020-04-15 ENCOUNTER — TELEPHONE (OUTPATIENT)
Dept: HEMATOLOGY ONCOLOGY | Facility: CLINIC | Age: 60
End: 2020-04-15

## 2020-04-15 ENCOUNTER — TELEMEDICINE (OUTPATIENT)
Dept: HEMATOLOGY ONCOLOGY | Facility: CLINIC | Age: 60
End: 2020-04-15
Payer: COMMERCIAL

## 2020-04-15 DIAGNOSIS — C79.31 MALIGNANT MELANOMA METASTATIC TO BRAIN (HCC): ICD-10-CM

## 2020-04-15 DIAGNOSIS — C43.9 MALIGNANT MELANOMA, UNSPECIFIED SITE (HCC): Primary | ICD-10-CM

## 2020-04-15 DIAGNOSIS — C79.9 METASTATIC MELANOMA (HCC): ICD-10-CM

## 2020-04-15 DIAGNOSIS — R59.0 MESENTERIC LYMPHADENOPATHY: ICD-10-CM

## 2020-04-15 PROCEDURE — 99443 PR PHYS/QHP TELEPHONE EVALUATION 21-30 MIN: CPT | Performed by: INTERNAL MEDICINE

## 2020-10-14 ENCOUNTER — TELEPHONE (OUTPATIENT)
Dept: HEMATOLOGY ONCOLOGY | Facility: CLINIC | Age: 60
End: 2020-10-14

## 2020-10-20 ENCOUNTER — TELEPHONE (OUTPATIENT)
Dept: HEMATOLOGY ONCOLOGY | Facility: MEDICAL CENTER | Age: 60
End: 2020-10-20

## 2020-10-20 ENCOUNTER — TELEPHONE (OUTPATIENT)
Dept: HEMATOLOGY ONCOLOGY | Facility: CLINIC | Age: 60
End: 2020-10-20

## 2020-11-03 ENCOUNTER — OFFICE VISIT (OUTPATIENT)
Dept: HEMATOLOGY ONCOLOGY | Facility: CLINIC | Age: 60
End: 2020-11-03
Payer: COMMERCIAL

## 2020-11-03 VITALS
WEIGHT: 250.5 LBS | DIASTOLIC BLOOD PRESSURE: 72 MMHG | TEMPERATURE: 97.8 F | RESPIRATION RATE: 17 BRPM | BODY MASS INDEX: 32.15 KG/M2 | SYSTOLIC BLOOD PRESSURE: 136 MMHG | OXYGEN SATURATION: 96 % | HEIGHT: 74 IN | HEART RATE: 88 BPM

## 2020-11-03 DIAGNOSIS — R91.8 LUNG NODULES: ICD-10-CM

## 2020-11-03 DIAGNOSIS — R59.0 LAD (LYMPHADENOPATHY), INTRA-ABDOMINAL: ICD-10-CM

## 2020-11-03 DIAGNOSIS — C79.9 METASTATIC MELANOMA (HCC): Primary | ICD-10-CM

## 2020-11-03 DIAGNOSIS — C79.31 MALIGNANT MELANOMA METASTATIC TO BRAIN (HCC): ICD-10-CM

## 2020-11-03 DIAGNOSIS — Z85.89 HISTORY OF CANCER METASTATIC TO BRAIN: ICD-10-CM

## 2020-11-03 PROCEDURE — 99214 OFFICE O/P EST MOD 30 MIN: CPT | Performed by: INTERNAL MEDICINE

## 2020-11-03 RX ORDER — LEVOTHYROXINE SODIUM 150 UG/1
150 TABLET ORAL DAILY
COMMUNITY
Start: 2020-10-15

## 2021-01-17 ENCOUNTER — TELEPHONE (OUTPATIENT)
Dept: OTHER | Facility: OTHER | Age: 61
End: 2021-01-17

## 2021-01-17 NOTE — TELEPHONE ENCOUNTER
Patient would like to make an appointment with Dr Konstantin Cutler    Please call Monday to schedule

## 2021-01-18 ENCOUNTER — TELEPHONE (OUTPATIENT)
Dept: HEMATOLOGY ONCOLOGY | Facility: CLINIC | Age: 61
End: 2021-01-18

## 2021-01-18 ENCOUNTER — NURSE TRIAGE (OUTPATIENT)
Dept: HEMATOLOGY ONCOLOGY | Facility: CLINIC | Age: 61
End: 2021-01-18

## 2021-01-18 NOTE — TELEPHONE ENCOUNTER
Called patient at phone number provided, [phone]  Left message letting him know we set up an appt for him to be seen by Dr Ivette Damico in the [facility] on 01/27/2021 at 10am  I let him know if he had any questions or concerns to give us a call back at [phone]

## 2021-01-18 NOTE — TELEPHONE ENCOUNTER
Called patient at number provided [phone], in regards to him requesting to make an appt with Dr Vivien Bell  His phone went straight to  and I was uncertain it if was [facility] to leave  regarding his appt  Patient has appt scheduled already with Dr Vivien Bell on 5/5/2021 at 3:20 for a F/U

## 2021-01-18 NOTE — TELEPHONE ENCOUNTER
Call from patient  Patient has dry cough and acute pain in right flank  Onset:3 days  Patient under care of PCP Dr Roya Coleman from [address], not affiliated with [facility]  Patient is afebrile, covid test negative  Is undergoing diagnostic w/u including back and chest  xrays, urine cultures and was place on bactrim  Will update Dr Loo President RN  Patient will contact office with results of work up  Patient aware of plan

## 2021-01-18 NOTE — TELEPHONE ENCOUNTER
Called patient at number provided regarding his request for an appt with Dr Madhavi Gates, number listed went straight to , unsure if it is ok per patient to leave  regarding his appt that is already scheduled with Dr Madhavi Gates on 05/05/2021 at 3:20pm

## 2021-01-25 ENCOUNTER — TELEPHONE (OUTPATIENT)
Dept: HEMATOLOGY ONCOLOGY | Facility: CLINIC | Age: 61
End: 2021-01-25

## 2021-01-25 NOTE — TELEPHONE ENCOUNTER
The patient was phoned and a message was left on his voicemail reminding him to have his labs completed prior to his appointment with Dr Phililp Dugan on 1/26 in the [facility] office  The [facility] number was provided

## 2021-01-26 ENCOUNTER — TELEPHONE (OUTPATIENT)
Dept: HEMATOLOGY ONCOLOGY | Facility: CLINIC | Age: 61
End: 2021-01-26

## 2021-01-26 NOTE — TELEPHONE ENCOUNTER
Patient called requesting lab orders be faxed to [phone] while currently at the lab, called [facility] and requested orders be faxed

## 2021-01-27 ENCOUNTER — OFFICE VISIT (OUTPATIENT)
Dept: HEMATOLOGY ONCOLOGY | Facility: CLINIC | Age: 61
End: 2021-01-27
Payer: COMMERCIAL

## 2021-01-27 VITALS
SYSTOLIC BLOOD PRESSURE: 140 MMHG | WEIGHT: 250 LBS | DIASTOLIC BLOOD PRESSURE: 99 MMHG | HEIGHT: 74 IN | TEMPERATURE: 97.6 F | OXYGEN SATURATION: 95 % | HEART RATE: 93 BPM | BODY MASS INDEX: 32.08 KG/M2 | RESPIRATION RATE: 17 BRPM

## 2021-01-27 DIAGNOSIS — C79.31 MALIGNANT MELANOMA METASTATIC TO BRAIN (HCC): Primary | ICD-10-CM

## 2021-01-27 DIAGNOSIS — R59.0 LAD (LYMPHADENOPATHY), INTRA-ABDOMINAL: ICD-10-CM

## 2021-01-27 DIAGNOSIS — C79.9 METASTATIC MELANOMA (HCC): ICD-10-CM

## 2021-01-27 PROCEDURE — 99214 OFFICE O/P EST MOD 30 MIN: CPT | Performed by: INTERNAL MEDICINE

## 2021-01-27 RX ORDER — SULFAMETHOXAZOLE AND TRIMETHOPRIM 800; 160 MG/1; MG/1
1 TABLET ORAL
COMMUNITY
Start: 2021-01-18 | End: 2021-01-28

## 2021-01-27 NOTE — PROGRESS NOTES
HEMATOLOGY / ONCOLOGY CLINIC NOTE    Primary Care Provider: Fidel Montoya MD  Referring Provider:    MRN: [id number]  : 1960    Reason for Encounter:    Chief Complaint   Patient presents with    Follow-up         History of Hematology / Oncology Illness:     Mayte Damian is a 63 y o  male who came in  to establish care with oncology      1, metastatic melanoma, with diffuse Mets to brain, liver, lung, on surveillance    - diagnosed in , found having diffuse metastatic disease, [place] [address] clinical trial where he receive Ipilimumab and Nivolumab combination followed by monotherapy with Nivolumab  He completed 39 cycles of Nivolumab on 17      - 2017, treatment was on hold due to grade 3 colitis, in combination of anemia  Patient was since then on observation, with CT chest abdomen pelvic, brain MRI every 3 months  On surveillance since 2017, no evidence for cancer recurrence  - most recent scan:  10/2020, result is in Care everywhere; lab result is scanned in media         Assessment / Plan:       1  Metastatic melanoma ([address] )    -  recently developed mild dry cough and right flank pain, has been going on for 2 weeks and has been slightly better  Review lab, chest x-ray, x-ray of back results are available in Care everywhere, no major findings, patient's symptom has been slightly better, advised patient to closely monitor  If in 2 weeks symptoms are still not improving or has other issues, low threshold to repeat a CT scan  As for now we decided to monitor patient  He is due for another lab and CT chest abdomen pelvic in 2021, will follow patient as scheduled in early May      2  Malignant melanoma metastatic to brain [facility])  - as above     3  Lung nodules  - as above     4  LAD (lymphadenopathy), intra-abdominal  - as above     5   History of cancer metastatic to brain          Made patient aware regarding side effects of chemotherapy, including but not limited to fatigue cytopenia, increased risk for infection, potential kidney, liver injuries neuropathies et al    Made patient aware to call MD or go to ED for any fever,  Chills, bleeding, easy bruise, unhealed wound, chest pain, abdominal pain et al                   25     minutes were spent face to face with patient with greater than 50% of the time spent in counseling or coordination of care including discussions of treatment instructions  All of the patient's questions were answered to their satisfactory during this discussion  Advised pt to call if there is any further questions  Interval History:     7/8/2019:  Patient came in for follow-up, reported doing well  No bleeding, body weight is stable, no new lumps bumps  10/16/2019 :  Came in for follow-up  Reported doing well, no bleeding body weight is stable  No Skin lesion  11/3/2020 :  Patient came in for follow-up, overall doing well, no new skin lesion, no subjective lumps bumps, body weight stable, no headache chest pain cough hemoptysis, no focal neuro deficits  1/27/2021:  Came in for follow-up, overall doing okay, recently has developed mild dry cough and right flank pain, symptoms has been slightly better  Problem list:       Patient Active Problem List   Diagnosis    Metast[facility] ([facility] )    Malignant melanoma metastatic to brain ([facility] )    Lung nodules    LAD (lymphadenopathy), intra-abdominal         PHYSICIAL EXAMINATION:     Vital Signs:   [unfilled]  Body mass index is 32 54 kg/m²  Body surface area is 2 37 meters squared  No major finding of examination    GEN: Alert, awake oriented x3, in no acute distress  HEENT- No pallor, icterus, cyanosis, no oral mucosal lesions,   LAD - no palpable cervical, clavicle, axillary, inguinal LAD  Heart- normal S1 S2, regular rate and rhythm, No murmur, rubs     Lungs- decreased breathing sound bilateral    Abdomen- soft, Non tender, bowel sounds present  Extremities- No cyanosis, clubbing, edema  Neuro- No focal neurological deficit           PAST MEDICAL HISTORY:   has a past medical history of Brain metastasis ([facility] ) and Melanoma ([facility] )  PAST SURGICAL HISTORY:   has a past surgical history that includes Rotator cuff repair; Posterior fusion cervical spine; Carpal tunnel release; Appendectomy; and Joint replacement  CURRENT MEDICATIONS:     Current Outpatient Medications   Medication Sig Dispense Refill    sulfamethoxazole-trimethoprim (BACTRIM DS) 800-160 mg per tablet Take 1 tablet by mouth      Synthroid 150 MCG tablet Take 150 mcg by mouth daily        No current facility-administered medications for this visit  [unfilled]    SOCIAL HISTORY:   reports that he has never smoked  He has never used smokeless tobacco  He reports that he does not drink alcohol or use drugs  FAMILY HISTORY:  family history is not on file  ALLERGIES:  is allergic to iodinated diagnostic agents  REVIEW OF SYSTEMS:  Please note that a 14-point review of systems was performed to include Constitutional, HEENT, Respiratory, CVS, GI, , Musculoskeletal, Integumentary, Neurologic, Rheumatologic, Endocrinologic, Psychiatric, Lymphatic, and Hematologic/Oncologic systems were reviewed and are negative unless otherwise stated in HPI  Positive and negative findings pertinent to this evaluation are incorporated into the history of present illness            Lab Results   Component Value Date     01/04/2016    SODIUM 141 01/23/2018    K 4 3 01/23/2018     01/23/2018    CO2 26 01/23/2018    ANIONGAP 8 01/04/2016    AGAP 8 01/23/2018    BUN 14 01/23/2018    CREATININE 1 21 01/23/2018    GLUC 99 01/23/2018    CALCIUM 8 7 01/23/2018    AST 23 01/23/2018    ALT 40 01/23/2018    ALKPHOS 77 01/23/2018    PROT 8 2 01/04/2016    TP 6 1 (L) 01/23/2018    BILITOT 0 40 01/04/2016    TBILI 0 20 01/23/2018    EGFR 66 01/23/2018       CBC with diff:       Invalid input(s):  RBC, TOTALCELLSCOUNTED, SEGS%, GRANS%, LYMPHS%, EOS%, BASO%, ABNEUT, ABGRANS, ABLYMPHS, ABMOMOS, ABEOS, ABBASO    CMP:      Invalid input(s): ALBUMIN    IMAGING:    No orders to display     No results found

## 2021-04-23 ENCOUNTER — TELEPHONE (OUTPATIENT)
Dept: HEMATOLOGY ONCOLOGY | Facility: CLINIC | Age: 61
End: 2021-04-23

## 2021-04-23 NOTE — TELEPHONE ENCOUNTER
[place] from the [facility] is requesting the order for the CT chest abdomen pelvis wo contrast & MRI brain w wo contrast ordered by Dr Israel Heath faxed to them at [phone]

## 2021-05-05 ENCOUNTER — OFFICE VISIT (OUTPATIENT)
Dept: HEMATOLOGY ONCOLOGY | Facility: CLINIC | Age: 61
End: 2021-05-05
Payer: COMMERCIAL

## 2021-05-05 VITALS
BODY MASS INDEX: 33.91 KG/M2 | DIASTOLIC BLOOD PRESSURE: 82 MMHG | SYSTOLIC BLOOD PRESSURE: 148 MMHG | OXYGEN SATURATION: 99 % | HEIGHT: 72 IN | TEMPERATURE: 96.6 F | RESPIRATION RATE: 16 BRPM | HEART RATE: 102 BPM

## 2021-05-05 DIAGNOSIS — C79.31 MALIGNANT MELANOMA METASTATIC TO BRAIN (HCC): Primary | ICD-10-CM

## 2021-05-05 DIAGNOSIS — C79.9 METASTATIC MELANOMA (HCC): ICD-10-CM

## 2021-05-05 PROCEDURE — 99214 OFFICE O/P EST MOD 30 MIN: CPT | Performed by: INTERNAL MEDICINE

## 2021-05-05 NOTE — PROGRESS NOTES
HEMATOLOGY / ONCOLOGY CLINIC NOTE    Primary Care Provider: Symone Lema MD  Referring Provider:    MRN: [id number]  : 1960    Reason for Encounter:    Chief Complaint   Patient presents with    Follow-up         History of Hematology / Oncology Illness:     Cortes Parish is a 63 y o  male who came in  to establish care with oncology      1, metastatic melanoma, with diffuse Mets to brain, liver, lung, on surveillance    - diagnosed in , found having diffuse metastatic disease, [place] [address] clinical trial where he receive Ipilimumab and Nivolumab combination followed by monotherapy with Nivolumab  He completed 39 cycles of Nivolumab on 17      - 2017, treatment was on hold due to grade 3 colitis, in combination of anemia  Patient was since then on observation, with CT chest abdomen pelvic, brain MRI every 3 months  On surveillance since 2017, no evidence for cancer recurrence  - most recent scan:  2021, no evidence of cancer recurrence  result is in Care everywhere; lab result is scanned in media         Assessment / Plan:          1  Malignant melanoma metastatic to brain [facility])     - has been more than 5 years since last flares up, clinically patient's care  Continue surveillance  Checking labs and CT scan chest abdomen pelvic MRI brain in 6 months  Follow patient afterwards  - if no evidence of recurrence will then follow patient on a yearly basis  - patient still following dermatologist  - made patient aware to discuss with family doctor for other routine cancer screening     - CBC and differential; Standing  - Comprehensive metabolic panel; Standing  - LD,Blood; Standing  - CT chest abdomen pelvis w contrast; Future  - MRI brain w wo contrast; Future  - CBC and differential  - Comprehensive metabolic panel  - LD,Blood    2   Metastatic melanoma ([facility] )     - CBC and differential; Standing  - Comprehensive metabolic panel; Standing  - LD,Blood; Standing  - CT chest abdomen pelvis w contrast; Future  - MRI brain w wo contrast; Future  - CBC and differential  - Comprehensive metabolic panel  - LD,Blood        3  Lung nodules  - as above          Made patient aware regarding side effects of chemotherapy, including but not limited to fatigue cytopenia, increased risk for infection, potential kidney, liver injuries neuropathies et al    Made patient aware to call MD or go to ED for any fever,  Chills, bleeding, easy bruise, unhealed wound, chest pain, abdominal pain et al                   25     minutes were spent face to face with patient with greater than 50% of the time spent in counseling or coordination of care including discussions of treatment instructions  All of the patient's questions were answered to their satisfactory during this discussion  Advised pt to call if there is any further questions  Interval History:     7/8/2019:  Patient came in for follow-up, reported doing well  No bleeding, body weight is stable, no new lumps bumps  10/16/2019 :  Came in for follow-up  Reported doing well, no bleeding body weight is stable  No Skin lesion  11/3/2020 :  Patient came in for follow-up, overall doing well, no new skin lesion, no subjective lumps bumps, body weight stable, no headache chest pain cough hemoptysis, no focal neuro deficits  1/27/2021:  Came in for follow-up, overall doing okay, recently has developed mild dry cough and right flank pain, symptoms has been slightly better  5/5/2021:  Patient came in for follow-up, subjective patient doing very well no lumps bumps  No constitutional symptoms      Problem list:       Patient Active Problem List   Diagnosis    [facility]ast[facility] ([facility] )    Malignant melanoma metastatic to brain ([facility] )    Lung nodules    LAD (lymphadenopathy), intra-abdominal         PHYSICIAL EXAMINATION:     Vital Signs:   [unfilled]  Body mass index is 33 91 kg/m²  Body surface area is 2 34 meters squared      No major findings on examination      GEN: Alert, awake oriented x3, in no acute distress  HEENT- No pallor, icterus, cyanosis, no oral mucosal lesions,   LAD - no palpable cervical, clavicle, axillary, inguinal LAD  Heart- normal S1 S2, regular rate and rhythm, No murmur, rubs  Lungs- decreased breathing sound bilateral    Abdomen- soft, Non tender, bowel sounds present  Extremities- No cyanosis, clubbing, edema  Neuro- No focal neurological deficit           PAST MEDICAL HISTORY:   has a past medical history of Brain metastasis ([facility] ) and Melanoma ([facility] )  PAST SURGICAL HISTORY:   has a past surgical history that includes Rotator cuff repair; Posterior fusion cervical spine; Carpal tunnel release; Appendectomy; and Joint replacement  CURRENT MEDICATIONS:     Current Outpatient Medications   Medication Sig Dispense Refill    Synthroid 150 MCG tablet Take 150 mcg by mouth daily        No current facility-administered medications for this visit  @[employer]@    SOCIAL HISTORY:   reports that he has never smoked  He has never used smokeless tobacco  He reports that he does not drink alcohol or use drugs  FAMILY HISTORY:  family history is not on file  ALLERGIES:  is allergic to iodinated diagnostic agents  REVIEW OF SYSTEMS:  Please note that a 14-point review of systems was performed to include Constitutional, HEENT, Respiratory, CVS, GI, , Musculoskeletal, Integumentary, Neurologic, Rheumatologic, Endocrinologic, Psychiatric, Lymphatic, and Hematologic/Oncologic systems were reviewed and are negative unless otherwise stated in HPI  Positive and negative findings pertinent to this evaluation are incorporated into the history of present illness            Lab Results   Component Value Date     01/04/2016    SODIUM 141 01/23/2018    K 4 3 01/23/2018     01/23/2018    CO2 26 01/23/2018    ANIONGAP 8 01/04/2016    AGAP 8 01/23/2018    BUN 14 01/23/2018    CREATININE 1 21 01/23/2018    GLUC 99 01/23/2018    CALCIUM 8 7 01/23/2018    AST 23 01/23/2018    ALT 40 01/23/2018    ALKPHOS 77 01/23/2018    PROT 8 2 01/04/2016    TP 6 1 (L) 01/23/2018    BILITOT 0 40 01/04/2016    TBILI 0 20 01/23/2018    EGFR 66 01/23/2018       CBC with diff:       Invalid input(s):  RBC, TOTALCELLSCOUNTED, SEGS%, GRANS%, LYMPHS%, EOS%, BASO%, ABNEUT, ABGRANS, ABLYMPHS, ABMOMOS, ABEOS, ABBASO    CMP:      Invalid input(s): ALBUMIN    IMAGING:    CT chest abdomen pelvis w contrast    (Results Pending)   MRI brain w wo contrast    (Results Pending)     No results found

## 2021-05-10 ENCOUNTER — TELEPHONE (OUTPATIENT)
Dept: OTHER | Facility: OTHER | Age: 61
End: 2021-05-10

## 2021-05-10 NOTE — TELEPHONE ENCOUNTER
Patient is currently positive for COVID and his PCP is suggesting antibody treatment  He wants to know if this would be okay? Please call patient back?

## 2021-05-11 ENCOUNTER — TELEPHONE (OUTPATIENT)
Dept: HEMATOLOGY ONCOLOGY | Facility: CLINIC | Age: 61
End: 2021-05-11

## 2021-05-11 NOTE — TELEPHONE ENCOUNTER
Patient called to f/u on task sent this AM regarding covid antibody treatment  Patient tested positive for covid at PCP yesterday  Dr Daria Hernandez [facility]  Will retask Dr Stepan Chaudhary team  Patient aware of plan

## 2021-05-11 NOTE — TELEPHONE ENCOUNTER
Spoke with patient and advised him it was okay for him to have covid antibody treatment per Dr Roz Guo   He voiced understanding and appreciation

## 2021-09-02 ENCOUNTER — TELEPHONE (OUTPATIENT)
Dept: HEMATOLOGY ONCOLOGY | Facility: CLINIC | Age: 61
End: 2021-09-02

## 2021-09-02 NOTE — TELEPHONE ENCOUNTER
Left message for patient that his appt time with Dr Rene Mejia on 11/10 has been changed to 2:20pm  Advised patient to call back if this new appt time does not work for him

## 2021-10-21 ENCOUNTER — TELEPHONE (OUTPATIENT)
Dept: HEMATOLOGY ONCOLOGY | Facility: CLINIC | Age: 61
End: 2021-10-21

## 2021-11-03 ENCOUNTER — TELEPHONE (OUTPATIENT)
Dept: HEMATOLOGY ONCOLOGY | Facility: CLINIC | Age: 61
End: 2021-11-03

## 2021-11-03 DIAGNOSIS — C79.9 METASTATIC MELANOMA (HCC): Primary | ICD-10-CM

## 2021-11-03 DIAGNOSIS — C43.9 MALIGNANT MELANOMA, UNSPECIFIED SITE (HCC): ICD-10-CM

## 2021-11-03 DIAGNOSIS — C79.31 MALIGNANT MELANOMA METASTATIC TO BRAIN (HCC): ICD-10-CM

## 2021-11-03 DIAGNOSIS — R91.8 LUNG NODULES: ICD-10-CM

## 2021-11-10 ENCOUNTER — OFFICE VISIT (OUTPATIENT)
Dept: HEMATOLOGY ONCOLOGY | Facility: CLINIC | Age: 61
End: 2021-11-10
Payer: COMMERCIAL

## 2021-11-10 VITALS
HEART RATE: 105 BPM | OXYGEN SATURATION: 96 % | DIASTOLIC BLOOD PRESSURE: 82 MMHG | RESPIRATION RATE: 18 BRPM | HEIGHT: 72 IN | BODY MASS INDEX: 34.13 KG/M2 | WEIGHT: 252 LBS | SYSTOLIC BLOOD PRESSURE: 150 MMHG | TEMPERATURE: 97.5 F

## 2021-11-10 DIAGNOSIS — C79.9 METASTATIC MELANOMA (HCC): Primary | ICD-10-CM

## 2021-11-10 DIAGNOSIS — C79.31 MALIGNANT MELANOMA METASTATIC TO BRAIN (HCC): ICD-10-CM

## 2021-11-10 PROCEDURE — 99214 OFFICE O/P EST MOD 30 MIN: CPT | Performed by: INTERNAL MEDICINE

## 2022-03-29 ENCOUNTER — TELEPHONE (OUTPATIENT)
Dept: HEMATOLOGY ONCOLOGY | Facility: CLINIC | Age: 62
End: 2022-03-29

## 2022-03-29 NOTE — TELEPHONE ENCOUNTER
Orders changed  Left message for patient to call back  CT is not scheduled  If patient calls back, please provide number for central scheduling ([phone]) to schedule CT

## 2022-03-29 NOTE — TELEPHONE ENCOUNTER
Patient is scheduled to get his CT scan with contrast but he is allergic to the dye and needs order to be revised Patient can be reached back at [phone]

## 2022-04-04 ENCOUNTER — TELEPHONE (OUTPATIENT)
Dept: HEMATOLOGY ONCOLOGY | Facility: CLINIC | Age: 62
End: 2022-04-04

## 2022-04-04 NOTE — TELEPHONE ENCOUNTER
As per Priyanka appt needs to be changed for f/u in office and not virtual  Dr Leena Payton to see pt in office and is a BETY appt with 60 min   Left pt a message on VM to call the [facility] and change appt for an in office visit

## 2022-04-12 ENCOUNTER — TELEPHONE (OUTPATIENT)
Dept: HEMATOLOGY ONCOLOGY | Facility: CLINIC | Age: 62
End: 2022-04-12

## 2022-04-12 NOTE — TELEPHONE ENCOUNTER
Called to explain to pt that he is unable to see Dr Tremaine Ren virtually  for his appt and we can schedule him to come in to see her in office

## 2022-04-18 ENCOUNTER — TELEPHONE (OUTPATIENT)
Dept: HEMATOLOGY ONCOLOGY | Facility: CLINIC | Age: 62
End: 2022-04-18

## 2022-04-18 NOTE — TELEPHONE ENCOUNTER
CALL RETURN FORM   Reason for patient call? Questions about orders submitted by Dr Ginger Camejo    Patient's primary oncologist? Dr Ch Host    Name of person the patient was calling for? Dr Ginger Camejo   Any additional information to add, if applicable? Please call [phone]   Informed patient that the message will be forwarded to the team and someone will get back to them as soon as possible    Did you relay this information to the patient?   Yes

## 2022-04-19 NOTE — TELEPHONE ENCOUNTER
Patient is good to go for scans on Friday per oncology finance  Called Karen from [facility] [place] and relayed information  She confirmed she is able to see all three completed authorizations on her end  She confirmed patient is ok for scans on Friday as scheduled

## 2022-04-19 NOTE — TELEPHONE ENCOUNTER
Called and spoke with Roosevelt Roe  She states patient is scheduled for a CT chest wo contrast, CT abdomen and pelvis wo contrast and MRI brain w and wo contrast this Friday at 7:30 am at [facility]  Tests were ordered by Dr Lee Mixon on November 3rd 2021  Roosevelt Roe states all three tests require authorization  Roosevelt Roe provided tax ID and NPI  All information emailed to oncology finance

## 2022-04-19 NOTE — TELEPHONE ENCOUNTER
CALL RETURN FORM   Reason for patient call? Questions regarding CT chest without, CT abdomin pelvis without contrast, and MRI brain with and without contrast      Patient scheduled for 4/22/22    Follow up for prior authorization for Dr Jeremy Torre (from Dr Katie Padron)   Patient's primary oncologist?  Dr Jeremy Torre   Name of person the patient was calling for? Dr Jeremy Torre   Any additional information to add, if applicable? [address] [facility] [address] [facility]  [phone]   Informed patient that the message will be forwarded to the team and someone will get back to them as soon as possible    Did you relay this information to the patient?  Yes

## 2022-04-20 ENCOUNTER — TELEPHONE (OUTPATIENT)
Dept: HEMATOLOGY ONCOLOGY | Facility: CLINIC | Age: 62
End: 2022-04-20

## 2022-04-20 NOTE — TELEPHONE ENCOUNTER
Jeannie Estrada from [facility] is calling in requesting for an MRI brain w wo contrast to be faxed over to her at [address] can be reached back at [phone]

## 2022-04-21 ENCOUNTER — TELEPHONE (OUTPATIENT)
Dept: HEMATOLOGY ONCOLOGY | Facility: CLINIC | Age: 62
End: 2022-04-21

## 2022-04-21 NOTE — TELEPHONE ENCOUNTER
Sylvester Ponce from [facility] called in regarding an order for CT scan without contrast  Sylvester Ponce states that an order was never put in and request that an order is sent over  Sylvester Ponce provided a fax number of [phone]  Sylvester Ponce can be reached back at [phone]

## 2022-04-22 ENCOUNTER — TELEPHONE (OUTPATIENT)
Dept: HEMATOLOGY ONCOLOGY | Facility: CLINIC | Age: 62
End: 2022-04-22

## 2022-04-22 NOTE — TELEPHONE ENCOUNTER
Received call from Slime Cervantes with CT department at [facility]  Patient is there for CT chest abdomen pelvis wo contrast  Slime Cervantes states patient did not drink barium for study as he states he has never had to drink it for any other study  He also states he has an allergy to barium  Slime Cervantes made aware ok to proceed with CT without IV or oral contrast  New order faxed to Slime Cervantes at [phone]

## 2022-04-22 NOTE — TELEPHONE ENCOUNTER
Patient is still scheduled as a virtual with Dr Morse  Please reach out to the patient for the 3rd time and explain agian that he needs to come in person to see Dr Morse  If he ist still out of state then it the appointment still can not be done b/c of where Dr Morse practices  Deleta Shiloh: if we are unable to get a hold of the patient  Does Dr Morse want us to cancel all together?

## 2022-04-22 NOTE — TELEPHONE ENCOUNTER
CALL RETURN FORM   Reason for patient call? Patient requesting blood work order be sent to lab  He went to [facility] and they had no order for bloodwork  Please fax order to [phone]      Patient's primary oncologist?  Dr Les Thomas   Name of person the patient was calling for? Dr Les Thomas   Any additional information to add, if applicable? [phone]   Informed patient that the message will be forwarded to the team and someone will get back to them as soon as possible    Did you relay this information to the patient?   yes

## 2022-04-25 NOTE — TELEPHONE ENCOUNTER
Patient is calling in indicating that his labs have not been received yet and has provided me with a different fax number, [phone]      Labs have been refaxed

## 2022-04-28 NOTE — TELEPHONE ENCOUNTER
Please try reaching out one more time to patient and send a message to his My Chart as well  He cannot stay in her schedule  It needs to be a BETY for 40 minutes in office  We will have to cancel this appointment if we don't here back by next week

## 2022-05-05 ENCOUNTER — TELEPHONE (OUTPATIENT)
Dept: HEMATOLOGY ONCOLOGY | Facility: CLINIC | Age: 62
End: 2022-05-05

## 2022-05-06 ENCOUNTER — TELEPHONE (OUTPATIENT)
Dept: HEMATOLOGY ONCOLOGY | Facility: CLINIC | Age: 62
End: 2022-05-06

## 2022-05-06 NOTE — TELEPHONE ENCOUNTER
CALL RETURN FORM   Reason for patient call? Patient is calling about appmt with Dr Polo Lopez and clinical trials  Patient is travelling and not back until December    Patient's primary oncologist?  Dr Polo Lopez   Name of person the patient was calling for? Dr Polo Lopez    Any additional information to add, if applicable? Please call [phone]    Informed patient that the message will be forwarded to the team and someone will get back to them as soon as possible    Did you relay this information to the patient?   Yes

## 2022-06-06 ENCOUNTER — TELEPHONE (OUTPATIENT)
Dept: HEMATOLOGY ONCOLOGY | Facility: CLINIC | Age: 62
End: 2022-06-06

## 2022-06-06 NOTE — TELEPHONE ENCOUNTER
I discussed this with Dr Jeremy Torre  He is ok to see her in December  Thank you for checking  Could you please let patient know?

## 2022-06-06 NOTE — TELEPHONE ENCOUNTER
Appointment Cancellation Or Reschedule     Person calling in Patient    Provider Dr Rubi Madden   Office Visit Date and Time 5/11 3PM   Office Visit Location [facility]   Did patient want to reschedule their office appointment? If so, when was it scheduled to? Yes, 12/1 8AM   Is this patient calling to reschedule an infusion appointment? no   When is their next infusion appointment? no   Is this patient a Chemo patient? no   Reason for Cancellation or Reschedule Schedule conflict     If the patient is a treatment patient, please route this to the office nurse  If the patient is not on treatment, please route to the office MA

## 2022-11-30 ENCOUNTER — TELEPHONE (OUTPATIENT)
Dept: HEMATOLOGY ONCOLOGY | Facility: CLINIC | Age: 62
End: 2022-11-30

## 2022-11-30 DIAGNOSIS — C43.9 MALIGNANT MELANOMA, UNSPECIFIED SITE (HCC): ICD-10-CM

## 2022-11-30 DIAGNOSIS — C43.9 METASTATIC MELANOMA (HCC): ICD-10-CM

## 2022-11-30 DIAGNOSIS — C79.31 MALIGNANT MELANOMA METASTATIC TO BRAIN (HCC): Primary | ICD-10-CM

## 2022-11-30 NOTE — TELEPHONE ENCOUNTER
LVM inquiring about recent labs for office visit tomorrow with Dr Les Thomas  If no labs have been completed, pay must reschedule  Active lab orders in the chart  [employer] number provided

## 2022-12-01 ENCOUNTER — OFFICE VISIT (OUTPATIENT)
Dept: HEMATOLOGY ONCOLOGY | Facility: CLINIC | Age: 62
End: 2022-12-01

## 2022-12-01 VITALS
TEMPERATURE: 97.2 F | HEART RATE: 83 BPM | BODY MASS INDEX: 33.25 KG/M2 | OXYGEN SATURATION: 92 % | HEIGHT: 73 IN | DIASTOLIC BLOOD PRESSURE: 80 MMHG | SYSTOLIC BLOOD PRESSURE: 150 MMHG | RESPIRATION RATE: 16 BRPM

## 2022-12-01 DIAGNOSIS — C79.31 MALIGNANT MELANOMA METASTATIC TO BRAIN (HCC): Primary | ICD-10-CM

## 2022-12-01 DIAGNOSIS — C43.9 METASTATIC MELANOMA (HCC): ICD-10-CM

## 2022-12-01 NOTE — LETTER
December 4, 2022     Roosevelt Barrett, [address] [address]    Patient: Fox Mcmanus   YOB: 1960   Date of Visit: 12/1/2022       Dear Dr Gavin Pop: Thank you for referring Fox Mcmanus to me for evaluation  Below are my notes for this consultation  If you have questions, please do not hesitate to call me  I look forward to following your patient along with you  Sincerely,        Jurgen Vazquez MD        CC: No Recipients  Jurgen Vazquez MD  12/4/2022  3:12 PM  Sign when Signing Visit  [address] [facility]  [phone]  [phone]     Date of Visit: 12/1/2022  Name: Fox Mcmanus   YOB: 1960        Subjective    VISIT DIAGNOSIS:  Diagnoses and all orders for this visit:    Malignant melanoma metastatic to brain ([facility] [address] )  -     CBC and differential; Future  -     Comprehensive metabolic panel; Future  -     LD,Blood; Future    Metastatic melanoma (HCC)  -     CBC and differential; Future  -     Comprehensive metabolic panel; Future  -     LD,Blood;  Future        Oncology History   Metastatic melanoma ([facility] [address] )   1/1/2015 -  Cancer Staged    Staging form: Melanoma of the Skin, AJCC 7th Edition  - Clinical stage from 1/1/2015: Stage IV (T0, N0, M1c) - Signed by Jurgen Vazquez MD on 12/4/2022  Biopsy of metastatic site performed: Yes       1/1/2015 - 1/1/2017 Chemotherapy    Treated on clinic trial with combination ipilimumab and nivolumab followed by maintenance nivolumab        Cancer Staging   Metastatic melanoma ([facility] [address] )  Staging form: Melanoma of the Skin, AJCC 7th Edition  - Clinical stage from 1/1/2015: Stage IV (T0, N0, M1c) - Signed by Jurgen Vazquez MD on 12/4/2022          HISTORY OF PRESENT ILLNESS: Fox Mcmanus is a 60 y o  male  who has metastatic melanoma treated with combination ipilimumab and nivolumab followed by maintenance nivolumab from [id number] who continues on surveillance  He presents today as a transfer of care from Dr Asif Dickerson     All history is obtained from the patient and review of records  He was initially diagnosed in November of 2015  He had an unknown primary  He initially started having pain in his right kidney which continue to worsen which prompted him to go to the emergency room  He thought it was a kidney stone  He had imaging that was concerning for potential renal cell carcinoma with masses in both his kidneys  He underwent a biopsy which demonstrated melanoma  He was found to have diffuse metastatic disease  He was initially treated by Dr Louis tejada on a clinical trial   He received ipilimumab and nivolumab followed by maintenance nivolumab  Records state that treatment was stopped early and healing receive 3 doses if ipilimumab and nivolumab due to colitis which resolved with steroids  He also had anemia which improved  According to the patient, he received all 4 doses of combination ipilimumab and nivolumab and did not have any issues with colitis  He has been doing well and remained in surveillance after completing treatment in 2017  He did developed hypothyroidism due to treatment  He also states that he has an allergy to contrast/iodinated dye  He is doing well and feels good  Continues to live his life in an RV  He travels all over the country with his home-based being here in [place]  One of his sons lives in [place], [place]  He denies any new, changing, concerning skin lesions at this time  Denies any lymphadenopathy  We discussed that he had imaging done in April and June of 2022 which were negative for melanoma recurrence or metastasis  He states that in his late teens and early 25s he lived at higher elevations  He does describe having increased sunburns  He does think he maybe had 1 blistering sunburn  He denies any tanning bed use  He denies a family history of melanoma    He denies a family history of breast, ovarian, and pancreatic cancer  He has 1 son who is 40  He had brown hair when he was younger, has brown eyes, and is [place] descent  REVIEW OF SYSTEMS:  Review of Systems   Constitutional: Negative for appetite change, fatigue, fever and unexpected weight change  HENT:   Negative for lump/mass  Eyes: Negative for icterus  Respiratory: Negative for cough, shortness of breath and wheezing  Cardiovascular: Negative for leg swelling  Gastrointestinal: Negative for abdominal pain, constipation, diarrhea, nausea and vomiting  Genitourinary: Negative for difficulty urinating and hematuria  Musculoskeletal: Negative for arthralgias, gait problem and myalgias  Skin: Negative for itching and rash  No new, changing, or concerning lesions  Neurological: Negative for extremity weakness, gait problem, headaches, light-headedness and numbness  Hematological: Negative for adenopathy          MEDICATIONS:    Current Outpatient Medications:   •  Synthroid 150 MCG tablet, Take 150 mcg by mouth daily , Disp: , Rfl:      ALLERGIES:  Allergies   Allergen Reactions   • Iodinated Diagnostic Agents Itching and Swelling        ACTIVE PROBLEMS:  Patient Active Problem List   Diagnosis   • Metastatic melanoma ([facility] [address] )   • Malignant melanoma metastatic to brain ([facility] [address] )   • Lung nodules   • LAD (lymphadenopathy), intra-abdominal          PAST MEDICAL HISTORY:   Past Medical History:   Diagnosis Date   • Brain metastasis ([address] )    • Melanoma ([address] )         PAST SURGICAL HISTORY:  Past Surgical History:   Procedure Laterality Date   • APPENDECTOMY     • CARPAL TUNNEL RELEASE     • JOINT REPLACEMENT      bilat knee replacement 2009   • POSTERIOR FUSION CERVICAL SPINE     • ROTATOR CUFF REPAIR          SOCIAL HISTORY:  Social History     Socioeconomic History   • Marital status: /Civil Union     Spouse name: None   • Number of children: None   • Years of education: None   • Highest education level: None   Occupational History   • None   Tobacco Use   • Smoking status: Never   • Smokeless tobacco: Never   Substance and Sexual Activity   • Alcohol use: No   • Drug use: No   • Sexual activity: None   Other Topics Concern   • None   Social History Narrative   • None     Social Determinants of Health     Financial Resource Strain: Not on file   Food Insecurity: Not on file   Transportation Needs: Not on file   Physical Activity: Not on file   Stress: Not on file   Social Connections: Not on file   Intimate Partner Violence: Not on file   Housing Stability: Not on file        FAMILY HISTORY:  No family history on file  Objective    PHYSICAL EXAMINATION:   Blood pressure 150/80, pulse 83, temperature (!) 97 2 °F (36 2 °C), temperature source Temporal, resp  rate 16, height 6' 1" (1 854 m), SpO2 92 %  Pain Score: 0-No pain     ECOG Performance Status    Flowsheet Row Most Recent Value   ECOG Performance Status 0 - Fully active, able to carry on all pre-disease performance without restriction             Physical Exam  Constitutional:       General: He is not in acute distress  Appearance: Normal appearance  He is not toxic-appearing  HENT:      Mouth/Throat:      Mouth: Mucous membranes are moist       Pharynx: Oropharynx is clear  Eyes:      General: No scleral icterus  Cardiovascular:      Rate and Rhythm: Normal rate and regular rhythm  Pulses: Normal pulses  Heart sounds: No murmur heard  No friction rub  No gallop  Pulmonary:      Effort: Pulmonary effort is normal  No respiratory distress  Breath sounds: Normal breath sounds  No wheezing or rales  Abdominal:      General: There is no distension  Palpations: There is no mass  Tenderness: There is no abdominal tenderness  There is no rebound  Musculoskeletal:         General: No swelling or tenderness  Right lower leg: No edema        Left lower leg: No edema    Lymphadenopathy:      Head:      Right side of head: No submandibular, preauricular or posterior auricular adenopathy  Left side of head: No submandibular, preauricular or posterior auricular adenopathy  Cervical: No cervical adenopathy  Right cervical: No superficial or posterior cervical adenopathy  Left cervical: No superficial or posterior cervical adenopathy  Upper Body:      Right upper body: No supraclavicular or axillary adenopathy  Left upper body: No supraclavicular or axillary adenopathy  Lower Body: No right inguinal adenopathy  No left inguinal adenopathy  Skin:     Findings: No rash  Comments: No concerning skin lesions  Neurological:      General: No focal deficit present  Mental Status: He is alert and oriented to person, place, and time  Psychiatric:         Mood and Affect: Mood normal          Behavior: Behavior normal          Thought Content: Thought content normal          Judgment: Judgment normal          I reviewed lab data in the chart      WBC   Date Value Ref Range Status   01/23/2018 6 85 4 31 - 10 16 Thousand/uL Final   12/27/2017 7 31 4 31 - 10 16 Thousand/uL Final   11/24/2017 8 57 4 31 - 10 16 Thousand/uL Final   01/04/2016 6 51 4 31 - 10 16 Thousand/uL Final   12/22/2015 6 75 4 31 - 10 16 Thousand/uL Final     Hemoglobin   Date Value Ref Range Status   01/23/2018 9 9 (L) 12 0 - 17 0 g/dL Final   12/27/2017 10 6 (L) 12 0 - 17 0 g/dL Final   11/24/2017 11 7 (L) 12 0 - 17 0 g/dL Final   01/04/2016 14 6 12 0 - 17 0 g/dL Final   12/22/2015 13 9 12 0 - 17 0 g/dL Final     Platelets   Date Value Ref Range Status   01/23/2018 382 149 - 390 Thousands/uL Final   12/27/2017 376 149 - 390 Thousands/uL Final   11/24/2017 384 149 - 390 Thousands/uL Final   01/04/2016 317 149 - 390 Thousand/uL Final   12/22/2015 311 149 - 390 Thousand/uL Final     MCV   Date Value Ref Range Status   01/23/2018 79 (L) 82 - 98 fL Final   12/27/2017 82 82 - 98 fL Final   11/24/2017 86 82 - 98 fL Final   01/04/2016 83 82 - 98 fL Final   12/22/2015 84 82 - 98 fL Final      Sodium   Date Value Ref Range Status   01/04/2016 138 136 - 145 mmol/L Final   12/22/2015 139 136 - 145 mmol/L Final     Potassium   Date Value Ref Range Status   01/23/2018 4 3 3 5 - 5 3 mmol/L Final   12/27/2017 3 7 3 5 - 5 3 mmol/L Final   11/24/2017 4 0 3 5 - 5 3 mmol/L Final   01/04/2016 4 7 3 5 - 5 3 mmol/L Final   12/22/2015 3 9 3 5 - 5 3 mmol/L Final     Chloride   Date Value Ref Range Status   01/23/2018 107 100 - 108 mmol/L Final   12/27/2017 110 (H) 100 - 108 mmol/L Final   11/24/2017 107 100 - 108 mmol/L Final   01/04/2016 103 98 - 108 mmol/L Final   12/22/2015 103 98 - 108 mmol/L Final     CO2   Date Value Ref Range Status   01/23/2018 26 21 - 32 mmol/L Final   12/27/2017 26 21 - 32 mmol/L Final   11/24/2017 26 21 - 32 mmol/L Final   01/04/2016 27 2 21 0 - 32 0 mmol/L Final   12/22/2015 26 7 21 0 - 32 0 mmol/L Final     BUN   Date Value Ref Range Status   01/23/2018 14 5 - 25 mg/dL Final   12/27/2017 12 5 - 25 mg/dL Final   11/24/2017 11 5 - 25 mg/dL Final   01/04/2016 20 5 - 25 mg/dL Final   12/22/2015 17 5 - 25 mg/dL Final     Creatinine   Date Value Ref Range Status   01/23/2018 1 21 0 60 - 1 30 mg/dL Final     Comment:     Standardized to IDMS reference method   12/27/2017 1 14 0 60 - 1 30 mg/dL Final     Comment:     Standardized to IDMS reference method   11/24/2017 1 11 0 60 - 1 30 mg/dL Final     Comment:     Standardized to IDMS reference method   01/04/2016 1 11 0 60 - 1 30 mg/dL Final     Comment:     Standardized to IDMS reference method   12/22/2015 1 10 0 60 - 1 30 mg/dL Final     Comment:     Standardized to IDMS reference method     Glucose   Date Value Ref Range Status   01/23/2018 99 65 - 140 mg/dL Final     Comment:       If the patient is fasting, the ADA then defines impaired fasting glucose as > 100 mg/dL and diabetes as > or equal to 123 mg/dL    Specimen collection should occur prior to Sulfasalazine administration due to the potential for falsely depressed results  Specimen collection should occur prior to Sulfapyridine administration due to the potential for falsely elevated results  12/27/2017 108 65 - 140 mg/dL Final     Comment:       If the patient is fasting, the ADA then defines impaired fasting glucose as > 100 mg/dL and diabetes as > or equal to 123 mg/dL  Specimen collection should occur prior to Sulfasalazine administration due to the potential for falsely depressed results  Specimen collection should occur prior to Sulfapyridine administration due to the potential for falsely elevated results  11/24/2017 96 65 - 140 mg/dL Final     Comment:       If the patient is fasting, the ADA then defines impaired fasting glucose as > 100 mg/dL and diabetes as > or equal to 123 mg/dL  Specimen collection should occur prior to Sulfasalazine administration due to the potential for falsely depressed results  Specimen collection should occur prior to Sulfapyridine administration due to the potential for falsely elevated results       Calcium   Date Value Ref Range Status   01/23/2018 8 7 8 3 - 10 1 mg/dL Final   12/27/2017 8 5 8 3 - 10 1 mg/dL Final   11/24/2017 8 1 (L) 8 3 - 10 1 mg/dL Final   01/04/2016 9 1 8 3 - 10 1 mg/dL Final   12/22/2015 8 8 8 3 - 10 1 mg/dL Final     Total Protein   Date Value Ref Range Status   01/04/2016 8 2 6 4 - 8 2 g/dL Final   12/22/2015 8 0 6 4 - 8 2 g/dL Final     Albumin   Date Value Ref Range Status   01/23/2018 3 0 (L) 3 5 - 5 0 g/dL Final   12/27/2017 3 0 (L) 3 5 - 5 0 g/dL Final   11/24/2017 3 0 (L) 3 5 - 5 0 g/dL Final   01/04/2016 4 2 3 5 - 5 0 g/dL Final   12/22/2015 4 1 3 5 - 5 0 g/dL Final     Total Bilirubin   Date Value Ref Range Status   01/23/2018 0 20 0 20 - 1 00 mg/dL Final   12/27/2017 0 30 0 20 - 1 00 mg/dL Final   11/24/2017 0 20 0 20 - 1 00 mg/dL Final     Alkaline Phosphatase   Date Value Ref Range Status   01/23/2018 77 46 - 116 U/L Final   12/27/2017 83 46 - 116 U/L Final   11/24/2017 72 46 - 116 U/L Final   01/04/2016 94 46 - 116 U/L Final   12/22/2015 95 46 - 116 U/L Final     AST   Date Value Ref Range Status   01/23/2018 23 5 - 45 U/L Final     Comment:       Specimen collection should occur prior to Sulfasalazine administration due to the potential for falsely depressed results  12/27/2017 18 5 - 45 U/L Final     Comment:       Specimen collection should occur prior to Sulfasalazine administration due to the potential for falsely depressed results  11/24/2017 21 5 - 45 U/L Final     Comment:       Specimen collection should occur prior to Sulfasalazine administration due to the potential for falsely depressed results  01/04/2016 14 5 - 45 U/L Final   12/22/2015 15 5 - 45 U/L Final     ALT   Date Value Ref Range Status   01/23/2018 40 12 - 78 U/L Final     Comment:       Specimen collection should occur prior to Sulfasalazine administration due to the potential for falsely depressed results  12/27/2017 22 12 - 78 U/L Final     Comment:       Specimen collection should occur prior to Sulfasalazine administration due to the potential for falsely depressed results  11/24/2017 25 12 - 78 U/L Final     Comment:       Specimen collection should occur prior to Sulfasalazine administration due to the potential for falsely depressed results      01/04/2016 20 12 - 78 U/L Final   12/22/2015 12 12 - 78 U/L Final      LD (LDH)   Date Value Ref Range Status   01/04/2016 236 (H) 81 - 234 U/L Final     Comment:     The above 1 analytes were performed by [facility] [id number]     12/22/2015 235 (H) 81 - 234 U/L Final     Comment:     The above 1 analytes were performed by [facility] [id number]       LD   Date Value Ref Range Status   12/27/2017 175 81 - 234 U/L Final   11/24/2017 224 81 - 234 U/L Final   11/06/2017 188 81 - 234 U/L Final     No results found for: TSH  No results found for: [id number]   Free T4   Date Value Ref Range Status   01/23/2018 1 14 0 76 - 1 46 ng/dL Final     Comment:       Specimen collection should occur prior to Sulfasalazine administration due to the potential for falsely elevated results  12/27/2017 0 95 0 76 - 1 46 ng/dL Final     Comment:       Specimen collection should occur prior to Sulfasalazine administration due to the potential for falsely elevated results  11/24/2017 1 02 0 76 - 1 46 ng/dL Final     Comment:       Specimen collection should occur prior to Sulfasalazine administration due to the potential for falsely elevated results  12/22/2015 0 81 0 76 - 1 46 ng/dL Final     Comment:     The above 1 analytes were performed by [id number]           RECENT IMAGING:  No results found  Assessment/Plan  Mr Lisa Asencio is a 60 yr male with metastatic melanoma originally diagnosed in 2015 and treated with combination ipilimumab and nivolumab followed by nivolumab maintenance until 2017 from which has remained no evidence of disease and remains on surveillance  1  Malignant melanoma metastatic to brain ([facility] [address] )  2  Metastatic melanoma ([facility] [address] )  Reviewed his melanoma history and available imaging from April and May of 2022 which are negative for melanoma recurrence or metastasis  He was unable to get blood work prior to his visit today, but he will get it following visit  We discussed that he had metastatic melanoma and he was treated on a clinical trial, but the regimen he received is our standard of care today  He completed treatment in 2017, which is 5 years ago  His scans have remained negative with no evidence of melanoma recurrence or metastasis  I explained that at this point we can stop scanning  He will continue with surveillance yearly  We will adjust his next visit to accommodate when he is in the area, and then reset the clock for yearly visits are after    He does need blood work for today's visit, which here get after the visit  Provided him with scripts for blood work for now and when he returns in approximately 7 months he knows that he can call with any issues or concerns prior to his next visit  He will continue with Dermatology follow-up  - CBC and differential; Future  - Comprehensive metabolic panel; Future  - LD,Blood; Future        Return in about 7 months (around 7/1/2023) for Office Visit, labs       Gogo Segovia MD, PhD

## 2022-12-04 NOTE — PROGRESS NOTES
[facility] HEMATOLOGY ONCOLOGY SPECIALISTS [address]  [facility] [id number]  [phone]     Date of Visit: 12/1/2022  Name: Kwame Lacey   YOB: 1960        Subjective    VISIT DIAGNOSIS:  Diagnoses and all orders for this visit:    Malignant melanoma metastatic to brain ([facility] [address] )  -     CBC and differential; Future  -     Comprehensive metabolic panel; Future  -     LD,Blood; Future    Metastatic melanoma (HCC)  -     CBC and differential; Future  -     Comprehensive metabolic panel; Future  -     LD,Blood; Future        Oncology History   Metastatic melanoma ([facility] [address] )   1/1/2015 -  Cancer Staged    Staging form: Melanoma of the Skin, AJCC 7th Edition  - Clinical stage from 1/1/2015: Stage IV (T0, N0, M1c) - Signed by Shirley Panda MD on 12/4/2022  Biopsy of metastatic site performed: Yes       1/1/2015 - 1/1/2017 Chemotherapy    Treated on clinic trial with combination ipilimumab and nivolumab followed by maintenance nivolumab        Cancer Staging   Metastatic melanoma ([facility] [address] )  Staging form: Melanoma of the Skin, AJCC 7th Edition  - Clinical stage from 1/1/2015: Stage IV (T0, N0, M1c) - Signed by Shirley Panda MD on 12/4/2022          HISTORY OF PRESENT ILLNESS: Kwame Lacey is a 60 y o  male  who has metastatic melanoma treated with combination ipilimumab and nivolumab followed by maintenance nivolumab from [id number] who continues on surveillance  He presents today as a transfer of care from Dr Audie Rodriguez     All history is obtained from the patient and review of records  He was initially diagnosed in November of 2015  He had an unknown primary  He initially started having pain in his right kidney which continue to worsen which prompted him to go to the emergency room  He thought it was a kidney stone  He had imaging that was concerning for potential renal cell carcinoma with masses in both his kidneys  He underwent a biopsy which demonstrated melanoma    He was found to have diffuse metastatic disease  He was initially treated by Dr Danette tejada on a clinical trial   He received ipilimumab and nivolumab followed by maintenance nivolumab  Records state that treatment was stopped early and healing receive 3 doses if ipilimumab and nivolumab due to colitis which resolved with steroids  He also had anemia which improved  According to the patient, he received all 4 doses of combination ipilimumab and nivolumab and did not have any issues with colitis  He has been doing well and remained in surveillance after completing treatment in 2017  He did developed hypothyroidism due to treatment  He also states that he has an allergy to contrast/iodinated dye  He is doing well and feels good  Continues to live his life in an RV  He travels all over the country with his home-based being here in [place]  One of his sons lives in [place], [place]  He denies any new, changing, concerning skin lesions at this time  Denies any lymphadenopathy  We discussed that he had imaging done in April and June of 2022 which were negative for melanoma recurrence or metastasis  He states that in his late teens and early 25s he lived at higher elevations  He does describe having increased sunburns  He does think he maybe had 1 blistering sunburn  He denies any tanning bed use  He denies a family history of melanoma  He denies a family history of breast, ovarian, and pancreatic cancer  He has 1 son who is 40  He had brown hair when he was younger, has brown eyes, and is [place] descent  REVIEW OF SYSTEMS:  Review of Systems   Constitutional: Negative for appetite change, fatigue, fever and unexpected weight change  HENT:   Negative for lump/mass  Eyes: Negative for icterus  Respiratory: Negative for cough, shortness of breath and wheezing  Cardiovascular: Negative for leg swelling     Gastrointestinal: Negative for abdominal pain, constipation, diarrhea, nausea and vomiting  Genitourinary: Negative for difficulty urinating and hematuria  Musculoskeletal: Negative for arthralgias, gait problem and myalgias  Skin: Negative for itching and rash  No new, changing, or concerning lesions  Neurological: Negative for extremity weakness, gait problem, headaches, light-headedness and numbness  Hematological: Negative for adenopathy          MEDICATIONS:    Current Outpatient Medications:   •  Synthroid 150 MCG tablet, Take 150 mcg by mouth daily , Disp: , Rfl:      ALLERGIES:  Allergies   Allergen Reactions   • Iodinated Diagnostic Agents Itching and Swelling        ACTIVE PROBLEMS:  Patient Active Problem List   Diagnosis   • Metastatic melanoma ([facility] )   • Malignant melanoma metastatic to brain ([facility] )   • Lung nodules   • LAD (lymphadenopathy), intra-abdominal          PAST MEDICAL HISTORY:   Past Medical History:   Diagnosis Date   • Brain metastasis ([facility] )    • Melanoma ([place] [id number] )         PAST SURGICAL HISTORY:  Past Surgical History:   Procedure Laterality Date   • APPENDECTOMY     • CARPAL TUNNEL RELEASE     • JOINT REPLACEMENT      bilat knee replacement 2009   • POSTERIOR FUSION CERVICAL SPINE     • ROTATOR CUFF REPAIR          SOCIAL HISTORY:  Social History     Socioeconomic History   • Marital status: /Civil Union     Spouse name: None   • Number of children: None   • Years of education: None   • Highest education level: None   Occupational History   • None   Tobacco Use   • Smoking status: Never   • Smokeless tobacco: Never   Substance and Sexual Activity   • Alcohol use: No   • Drug use: No   • Sexual activity: None   Other Topics Concern   • None   Social History Narrative   • None     Social Determinants of Health     Financial Resource Strain: Not on file   Food Insecurity: Not on file   Transportation Needs: Not on file   Physical Activity: Not on file   Stress: Not on file   Social Connections: Not on file   Intimate Partner Violence: Not on file   Housing Stability: Not on file        FAMILY HISTORY:  No family history on file  Objective    PHYSICAL EXAMINATION:   Blood pressure 150/80, pulse 83, temperature (!) 97 2 °F (36 2 °C), temperature source Temporal, resp  rate 16, height 6' 1" (1 854 m), SpO2 92 %  Pain Score: 0-No pain     ECOG Performance Status    Flowsheet Row Most Recent Value   ECOG Performance Status 0 - Fully active, able to carry on all pre-disease performance without restriction             Physical Exam  Constitutional:       General: He is not in acute distress  Appearance: Normal appearance  He is not toxic-appearing  HENT:      Mouth/Throat:      Mouth: Mucous membranes are moist       Pharynx: Oropharynx is clear  Eyes:      General: No scleral icterus  Cardiovascular:      Rate and Rhythm: Normal rate and regular rhythm  Pulses: Normal pulses  Heart sounds: No murmur heard  No friction rub  No gallop  Pulmonary:      Effort: Pulmonary effort is normal  No respiratory distress  Breath sounds: Normal breath sounds  No wheezing or rales  Abdominal:      General: There is no distension  Palpations: There is no mass  Tenderness: There is no abdominal tenderness  There is no rebound  Musculoskeletal:         General: No swelling or tenderness  Right lower leg: No edema  Left lower leg: No edema  Lymphadenopathy:      Head:      Right side of head: No submandibular, preauricular or posterior auricular adenopathy  Left side of head: No submandibular, preauricular or posterior auricular adenopathy  Cervical: No cervical adenopathy  Right cervical: No superficial or posterior cervical adenopathy  Left cervical: No superficial or posterior cervical adenopathy  Upper Body:      Right upper body: No supraclavicular or axillary adenopathy  Left upper body: No supraclavicular or axillary adenopathy        Lower Body: No right inguinal adenopathy  No left inguinal adenopathy  Skin:     Findings: No rash  Comments: No concerning skin lesions  Neurological:      General: No focal deficit present  Mental Status: He is alert and oriented to person, place, and time  Psychiatric:         Mood and Affect: Mood normal          Behavior: Behavior normal          Thought Content: Thought content normal          Judgment: Judgment normal          I reviewed lab data in the chart      WBC   Date Value Ref Range Status   01/23/2018 6 85 4 31 - 10 16 Thousand/uL Final   12/27/2017 7 31 4 31 - 10 16 Thousand/uL Final   11/24/2017 8 57 4 31 - 10 16 Thousand/uL Final   01/04/2016 6 51 4 31 - 10 16 Thousand/uL Final   12/22/2015 6 75 4 31 - 10 16 Thousand/uL Final     Hemoglobin   Date Value Ref Range Status   01/23/2018 9 9 (L) 12 0 - 17 0 g/dL Final   12/27/2017 10 6 (L) 12 0 - 17 0 g/dL Final   11/24/2017 11 7 (L) 12 0 - 17 0 g/dL Final   01/04/2016 14 6 12 0 - 17 0 g/dL Final   12/22/2015 13 9 12 0 - 17 0 g/dL Final     Platelets   Date Value Ref Range Status   01/23/2018 382 149 - 390 Thousands/uL Final   12/27/2017 376 149 - 390 Thousands/uL Final   11/24/2017 384 149 - 390 Thousands/uL Final   01/04/2016 317 149 - 390 Thousand/uL Final   12/22/2015 311 149 - 390 Thousand/uL Final     MCV   Date Value Ref Range Status   01/23/2018 79 (L) 82 - 98 fL Final   12/27/2017 82 82 - 98 fL Final   11/24/2017 86 82 - 98 fL Final   01/04/2016 83 82 - 98 fL Final   12/22/2015 84 82 - 98 fL Final      Sodium   Date Value Ref Range Status   01/04/2016 138 136 - 145 mmol/L Final   12/22/2015 139 136 - 145 mmol/L Final     Potassium   Date Value Ref Range Status   01/23/2018 4 3 3 5 - 5 3 mmol/L Final   12/27/2017 3 7 3 5 - 5 3 mmol/L Final   11/24/2017 4 0 3 5 - 5 3 mmol/L Final   01/04/2016 4 7 3 5 - 5 3 mmol/L Final   12/22/2015 3 9 3 5 - 5 3 mmol/L Final     Chloride   Date Value Ref Range Status   01/23/2018 107 100 - 108 mmol/L Final 12/27/2017 110 (H) 100 - 108 mmol/L Final   11/24/2017 107 100 - 108 mmol/L Final   01/04/2016 103 98 - 108 mmol/L Final   12/22/2015 103 98 - 108 mmol/L Final     CO2   Date Value Ref Range Status   01/23/2018 26 21 - 32 mmol/L Final   12/27/2017 26 21 - 32 mmol/L Final   11/24/2017 26 21 - 32 mmol/L Final   01/04/2016 27 2 21 0 - 32 0 mmol/L Final   12/22/2015 26 7 21 0 - 32 0 mmol/L Final     BUN   Date Value Ref Range Status   01/23/2018 14 5 - 25 mg/dL Final   12/27/2017 12 5 - 25 mg/dL Final   11/24/2017 11 5 - 25 mg/dL Final   01/04/2016 20 5 - 25 mg/dL Final   12/22/2015 17 5 - 25 mg/dL Final     Creatinine   Date Value Ref Range Status   01/23/2018 1 21 0 60 - 1 30 mg/dL Final     Comment:     Standardized to IDMS reference method   12/27/2017 1 14 0 60 - 1 30 mg/dL Final     Comment:     Standardized to IDMS reference method   11/24/2017 1 11 0 60 - 1 30 mg/dL Final     Comment:     Standardized to IDMS reference method   01/04/2016 1 11 0 60 - 1 30 mg/dL Final     Comment:     Standardized to IDMS reference method   12/22/2015 1 10 0 60 - 1 30 mg/dL Final     Comment:     Standardized to IDMS reference method     Glucose   Date Value Ref Range Status   01/23/2018 99 65 - 140 mg/dL Final     Comment:       If the patient is fasting, the ADA then defines impaired fasting glucose as > 100 mg/dL and diabetes as > or equal to 123 mg/dL  Specimen collection should occur prior to Sulfasalazine administration due to the potential for falsely depressed results  Specimen collection should occur prior to Sulfapyridine administration due to the potential for falsely elevated results  12/27/2017 108 65 - 140 mg/dL Final     Comment:       If the patient is fasting, the ADA then defines impaired fasting glucose as > 100 mg/dL and diabetes as > or equal to 123 mg/dL  Specimen collection should occur prior to Sulfasalazine administration due to the potential for falsely depressed results   Specimen collection should occur prior to Sulfapyridine administration due to the potential for falsely elevated results  11/24/2017 96 65 - 140 mg/dL Final     Comment:       If the patient is fasting, the ADA then defines impaired fasting glucose as > 100 mg/dL and diabetes as > or equal to 123 mg/dL  Specimen collection should occur prior to Sulfasalazine administration due to the potential for falsely depressed results  Specimen collection should occur prior to Sulfapyridine administration due to the potential for falsely elevated results  Calcium   Date Value Ref Range Status   01/23/2018 8 7 8 3 - 10 1 mg/dL Final   12/27/2017 8 5 8 3 - 10 1 mg/dL Final   11/24/2017 8 1 (L) 8 3 - 10 1 mg/dL Final   01/04/2016 9 1 8 3 - 10 1 mg/dL Final   12/22/2015 8 8 8 3 - 10 1 mg/dL Final     Total Protein   Date Value Ref Range Status   01/04/2016 8 2 6 4 - 8 2 g/dL Final   12/22/2015 8 0 6 4 - 8 2 g/dL Final     Albumin   Date Value Ref Range Status   01/23/2018 3 0 (L) 3 5 - 5 0 g/dL Final   12/27/2017 3 0 (L) 3 5 - 5 0 g/dL Final   11/24/2017 3 0 (L) 3 5 - 5 0 g/dL Final   01/04/2016 4 2 3 5 - 5 0 g/dL Final   12/22/2015 4 1 3 5 - 5 0 g/dL Final     Total Bilirubin   Date Value Ref Range Status   01/23/2018 0 20 0 20 - 1 00 mg/dL Final   12/27/2017 0 30 0 20 - 1 00 mg/dL Final   11/24/2017 0 20 0 20 - 1 00 mg/dL Final     Alkaline Phosphatase   Date Value Ref Range Status   01/23/2018 77 46 - 116 U/L Final   12/27/2017 83 46 - 116 U/L Final   11/24/2017 72 46 - 116 U/L Final   01/04/2016 94 46 - 116 U/L Final   12/22/2015 95 46 - 116 U/L Final     AST   Date Value Ref Range Status   01/23/2018 23 5 - 45 U/L Final     Comment:       Specimen collection should occur prior to Sulfasalazine administration due to the potential for falsely depressed results  12/27/2017 18 5 - 45 U/L Final     Comment:       Specimen collection should occur prior to Sulfasalazine administration due to the potential for falsely depressed results      11/24/2017 21 5 - 45 U/L Final     Comment:       Specimen collection should occur prior to Sulfasalazine administration due to the potential for falsely depressed results  01/04/2016 14 5 - 45 U/L Final   12/22/2015 15 5 - 45 U/L Final     ALT   Date Value Ref Range Status   01/23/2018 40 12 - 78 U/L Final     Comment:       Specimen collection should occur prior to Sulfasalazine administration due to the potential for falsely depressed results  12/27/2017 22 12 - 78 U/L Final     Comment:       Specimen collection should occur prior to Sulfasalazine administration due to the potential for falsely depressed results  11/24/2017 25 12 - 78 U/L Final     Comment:       Specimen collection should occur prior to Sulfasalazine administration due to the potential for falsely depressed results  01/04/2016 20 12 - 78 U/L Final   12/22/2015 12 12 - 78 U/L Final      LD (LDH)   Date Value Ref Range Status   01/04/2016 236 (H) 81 - 234 U/L Final     Comment:     The above 1 analytes were performed by [facility] [id number]     12/22/2015 235 (H) 81 - 234 U/L Final     Comment:     The above 1 analytes were performed by [facility] [id number]       LD   Date Value Ref Range Status   12/27/2017 175 81 - 234 U/L Final   11/24/2017 224 81 - 234 U/L Final   11/06/2017 188 81 - 234 U/L Final     No results found for: TSH  No results found for: [id number]   Free T4   Date Value Ref Range Status   01/23/2018 1 14 0 76 - 1 46 ng/dL Final     Comment:       Specimen collection should occur prior to Sulfasalazine administration due to the potential for falsely elevated results  12/27/2017 0 95 0 76 - 1 46 ng/dL Final     Comment:       Specimen collection should occur prior to Sulfasalazine administration due to the potential for falsely elevated results     11/24/2017 1 02 0 76 - 1 46 ng/dL Final     Comment:       Specimen collection should occur prior to Sulfasalazine administration due to the potential for falsely elevated results  12/22/2015 0 81 0 76 - 1 46 ng/dL Final     Comment:     The above 1 analytes were performed by [id number]           RECENT IMAGING:  No results found  Assessment/Plan  Mr Lisa Asencio is a 60 yr male with metastatic melanoma originally diagnosed in 2015 and treated with combination ipilimumab and nivolumab followed by nivolumab maintenance until 2017 from which has remained no evidence of disease and remains on surveillance  1  Malignant melanoma metastatic to brain ([facility] [address] )  2  Metastatic melanoma ([facility] [address] )  Reviewed his melanoma history and available imaging from April and May of 2022 which are negative for melanoma recurrence or metastasis  He was unable to get blood work prior to his visit today, but he will get it following visit  We discussed that he had metastatic melanoma and he was treated on a clinical trial, but the regimen he received is our standard of care today  He completed treatment in 2017, which is 5 years ago  His scans have remained negative with no evidence of melanoma recurrence or metastasis  I explained that at this point we can stop scanning  He will continue with surveillance yearly  We will adjust his next visit to accommodate when he is in the area, and then reset the clock for yearly visits are after  He does need blood work for today's visit, which here get after the visit  Provided him with scripts for blood work for now and when he returns in approximately 7 months he knows that he can call with any issues or concerns prior to his next visit  He will continue with Dermatology follow-up  - CBC and differential; Future  - Comprehensive metabolic panel; Future  - LD,Blood; Future        Return in about 7 months (around 7/1/2023) for Office Visit, labs       Annie Liriano MD, PhD

## 2023-03-31 NOTE — PLAN OF CARE
Potential for Falls     Patient will remain free of falls Progressing PAST SURGICAL HISTORY:  History of gastric surgery

## 2023-06-26 ENCOUNTER — TELEPHONE (OUTPATIENT)
Dept: HEMATOLOGY ONCOLOGY | Facility: CLINIC | Age: 63
End: 2023-06-26

## 2023-06-26 NOTE — TELEPHONE ENCOUNTER
Appointment Change  Cancel, Reschedule, Change to Virtual      Who are you speaking with? Patient   If it is not the patient, are they listed on an active communication consent form? N/A   Which provider is the appointment scheduled with? Dr Ella Graham   When is the appointment scheduled? Please list date and time  07/10/23 1PM   At which location is the appointment scheduled to take place? [facility]   Was the appointment rescheduled or changed from an in person visit to a virtual visit? If so, please list the details of the change  07/06/23 9:20AM   What is the reason for the appointment change? Scheduling conflict   Was [facility] transport scheduled for this visit? No   Does [facility] transport need to be scheduled for the new visit (if applicable) N/A   Does the patient need an infusion appointment rescheduled? No   Does the patient have an infusion appointment scheduled? If so, when? No   Is the patient undergoing chemotherapy? No   Was the no-show policy reviewed for appointments being changed with less then 24 hours of notice?  N/A

## 2023-06-29 ENCOUNTER — TELEPHONE (OUTPATIENT)
Dept: HEMATOLOGY ONCOLOGY | Facility: CLINIC | Age: 63
End: 2023-06-29

## 2023-07-06 ENCOUNTER — OFFICE VISIT (OUTPATIENT)
Dept: HEMATOLOGY ONCOLOGY | Facility: CLINIC | Age: 63
End: 2023-07-06
Payer: COMMERCIAL

## 2023-07-06 VITALS
OXYGEN SATURATION: 97 % | HEIGHT: 73 IN | DIASTOLIC BLOOD PRESSURE: 88 MMHG | TEMPERATURE: 98.2 F | BODY MASS INDEX: 30.68 KG/M2 | WEIGHT: 231.5 LBS | HEART RATE: 82 BPM | SYSTOLIC BLOOD PRESSURE: 160 MMHG

## 2023-07-06 DIAGNOSIS — C43.9 MALIGNANT MELANOMA, UNSPECIFIED SITE (HCC): Primary | ICD-10-CM

## 2023-07-06 DIAGNOSIS — C43.9 METASTATIC MELANOMA (HCC): ICD-10-CM

## 2023-07-06 DIAGNOSIS — C79.31 MALIGNANT MELANOMA METASTATIC TO BRAIN (HCC): ICD-10-CM

## 2023-07-06 PROCEDURE — 99213 OFFICE O/P EST LOW 20 MIN: CPT | Performed by: INTERNAL MEDICINE

## 2023-07-06 RX ORDER — LEVOTHYROXINE SODIUM 125 MCG
TABLET ORAL
COMMUNITY
Start: 2023-06-23

## 2023-07-06 NOTE — PROGRESS NOTES
[facility]  [address]  [place] [address]  [phone]     Date of Visit: 7/6/2023  Name: Dipti Murguia   YOB: 1960        Subjective    VISIT DIAGNOSIS:  Diagnoses and all orders for this visit:    Malignant melanoma, unspecified site ([address] )  -     CBC and differential; Future  -     Comprehensive metabolic panel; Future  -     LD,Blood; Future  -     TSH, 3rd generation; Future  -     T3, free; Future  -     T4, free; Future    Metastatic melanoma (HCC)  -     CBC and differential; Future  -     Comprehensive metabolic panel; Future  -     LD,Blood; Future  -     TSH, 3rd generation; Future  -     T3, free; Future  -     T4, free; Future    Malignant melanoma metastatic to brain ([address])  -     CBC and differential; Future  -     Comprehensive metabolic panel; Future  -     LD,Blood; Future  -     TSH, 3rd generation; Future  -     T3, free; Future  -     T4, free; Future    Other orders  -     Synthroid 125 MCG tablet        Oncology History   Metastatic melanoma ([address])   1/1/2015 -  Cancer Staged    Staging form: Melanoma of the Skin, AJCC 7th Edition  - Clinical stage from 1/1/2015: Stage IV (T0, N0, M1c) - Signed by Raffi Farmer MD on 12/4/2022  Biopsy of metastatic site performed: Yes       1/1/2015 - 1/1/2017 Chemotherapy    Treated on clinic trial with combination ipilimumab and nivolumab followed by maintenance nivolumab        Cancer Staging   Metastatic melanoma ([address] )  Staging form: Melanoma of the Skin, AJCC 7th Edition  - Clinical stage from 1/1/2015: Stage IV (T0, N0, M1c) - Signed by Raffi Farmer MD on 12/4/2022          HISTORY OF PRESENT ILLNESS: Dipti Murguia is a 60 y.o. male  who has metastatic melanoma with no evidence of disease here for continued monitoring, follow up, and surveillance. He is doing well and feels good. No issues, concerns, complaints today.   He denies new, changing, and concerning skin lesions. Denies LAD. Energy is good. Eating well. REVIEW OF SYSTEMS:  Review of Systems   Constitutional: Negative for appetite change, fatigue, fever and unexpected weight change. HENT:   Negative for lump/mass. Eyes: Negative for icterus. Respiratory: Negative for cough, shortness of breath and wheezing. Cardiovascular: Negative for leg swelling. Gastrointestinal: Negative for abdominal pain, constipation, diarrhea, nausea and vomiting. Genitourinary: Negative for difficulty urinating and hematuria. Musculoskeletal: Negative for arthralgias, gait problem and myalgias. Skin: Negative for itching and rash. No new, changing, or concerning lesions. + vitiligo   Neurological: Negative for extremity weakness, gait problem, headaches, light-headedness and numbness. Hematological: Negative for adenopathy.         MEDICATIONS:    Current Outpatient Medications:   •  Synthroid 125 MCG tablet, , Disp: , Rfl:   •  Synthroid 150 MCG tablet, Take 150 mcg by mouth daily , Disp: , Rfl:      ALLERGIES:  Allergies   Allergen Reactions   • Iodinated Contrast Media Itching and Swelling        ACTIVE PROBLEMS:  Patient Active Problem List   Diagnosis   • Metastatic melanoma ([address])   • Malignant melanoma metastatic to brain ([address])   • Lung nodules   • LAD (lymphadenopathy), intra-abdominal          PAST MEDICAL HISTORY:   Past Medical History:   Diagnosis Date   • Brain metastasis    • Melanoma ([address])         PAST SURGICAL HISTORY:  Past Surgical History:   Procedure Laterality Date   • APPENDECTOMY     • CARPAL TUNNEL RELEASE     • JOINT REPLACEMENT      bilat knee replacement 2009   • POSTERIOR FUSION CERVICAL SPINE     • ROTATOR CUFF REPAIR          SOCIAL HISTORY:  Social History     Socioeconomic History   • Marital status: /Civil Union     Spouse name: None   • Number of children: None   • Years of education: None   • Highest education level: None   Occupational History   • None   Tobacco Use   • Smoking status: Never   • Smokeless tobacco: Never   Substance and Sexual Activity   • Alcohol use: No   • Drug use: No   • Sexual activity: None   Other Topics Concern   • None   Social History Narrative   • None     Social Determinants of Health     Financial Resource Strain: Not on file   Food Insecurity: Not on file   Transportation Needs: Not on file   Physical Activity: Not on file   Stress: Not on file   Social Connections: Not on file   Intimate Partner Violence: Not on file   Housing Stability: Not on file        FAMILY HISTORY:  History reviewed. No pertinent family history. Objective    PHYSICAL EXAMINATION:   Blood pressure 160/88, pulse 82, temperature 98.2 °F (36.8 °C), temperature source Temporal, height 6' 1" (1.854 m), weight 105 kg (231 lb 8 oz), SpO2 97 %. Pain Score: 0-No pain     ECOG Performance Status    Flowsheet Row Most Recent Value   ECOG Performance Status 0 - Fully active, able to carry on all pre-disease performance without restriction             Physical Exam  Constitutional:       General: He is not in acute distress. Appearance: Normal appearance. He is not toxic-appearing. HENT:      Right Ear: External ear normal.      Left Ear: External ear normal.      Nose: Nose normal.      Mouth/Throat:      Mouth: Mucous membranes are moist.      Pharynx: Oropharynx is clear. Eyes:      General: No scleral icterus. Right eye: No discharge. Left eye: No discharge. Conjunctiva/sclera: Conjunctivae normal.   Cardiovascular:      Rate and Rhythm: Normal rate and regular rhythm. Pulses: Normal pulses. Heart sounds: No murmur heard. No friction rub. No gallop. Pulmonary:      Effort: Pulmonary effort is normal. No respiratory distress. Breath sounds: Normal breath sounds. No wheezing or rales. Abdominal:      General: Bowel sounds are normal. There is no distension. Palpations: There is no mass. Tenderness:  There is no abdominal tenderness. There is no rebound. Musculoskeletal:         General: No swelling or tenderness. Normal range of motion. Cervical back: Normal range of motion. No rigidity. Right lower leg: No edema. Left lower leg: No edema. Lymphadenopathy:      Head:      Right side of head: No submandibular, preauricular or posterior auricular adenopathy. Left side of head: No submandibular, preauricular or posterior auricular adenopathy. Cervical: No cervical adenopathy. Right cervical: No superficial or posterior cervical adenopathy. Left cervical: No superficial or posterior cervical adenopathy. Upper Body:      Right upper body: No supraclavicular or axillary adenopathy. Left upper body: No supraclavicular or axillary adenopathy. Lower Body: No right inguinal adenopathy. No left inguinal adenopathy. Skin:     General: Skin is warm. Coloration: Skin is not jaundiced. Findings: No lesion or rash. Comments: No concerning skin lesions. + vitiligo, diffuse. Neurological:      General: No focal deficit present. Mental Status: He is alert and oriented to person, place, and time. Cranial Nerves: No cranial nerve deficit. Motor: No weakness. Gait: Gait normal.   Psychiatric:         Mood and Affect: Mood normal.         Behavior: Behavior normal.         Thought Content: Thought content normal.         Judgment: Judgment normal.         I reviewed lab data in the chart.     WBC   Date Value Ref Range Status   01/23/2018 6.85 4.31 - 10.16 Thousand/uL Final   12/27/2017 7.31 4.31 - 10.16 Thousand/uL Final   11/24/2017 8.57 4.31 - 10.16 Thousand/uL Final   01/04/2016 6.51 4.31 - 10.16 Thousand/uL Final   12/22/2015 6.75 4.31 - 10.16 Thousand/uL Final     Hemoglobin   Date Value Ref Range Status   01/23/2018 9.9 (L) 12.0 - 17.0 g/dL Final   12/27/2017 10.6 (L) 12.0 - 17.0 g/dL Final   11/24/2017 11.7 (L) 12.0 - 17.0 g/dL Final   01/04/2016 14.6 12.0 - 17.0 g/dL Final   12/22/2015 13.9 12.0 - 17.0 g/dL Final     Platelets   Date Value Ref Range Status   01/23/2018 382 149 - 390 Thousands/uL Final   12/27/2017 376 149 - 390 Thousands/uL Final   11/24/2017 384 149 - 390 Thousands/uL Final   01/04/2016 317 149 - 390 Thousand/uL Final   12/22/2015 311 149 - 390 Thousand/uL Final     MCV   Date Value Ref Range Status   01/23/2018 79 (L) 82 - 98 fL Final   12/27/2017 82 82 - 98 fL Final   11/24/2017 86 82 - 98 fL Final   01/04/2016 83 82 - 98 fL Final   12/22/2015 84 82 - 98 fL Final      Sodium   Date Value Ref Range Status   01/04/2016 138 136 - 145 mmol/L Final   12/22/2015 139 136 - 145 mmol/L Final     Potassium   Date Value Ref Range Status   01/23/2018 4.3 3.5 - 5.3 mmol/L Final   12/27/2017 3.7 3.5 - 5.3 mmol/L Final   11/24/2017 4.0 3.5 - 5.3 mmol/L Final   01/04/2016 4.7 3.5 - 5.3 mmol/L Final   12/22/2015 3.9 3.5 - 5.3 mmol/L Final     Chloride   Date Value Ref Range Status   01/23/2018 107 100 - 108 mmol/L Final   12/27/2017 110 (H) 100 - 108 mmol/L Final   11/24/2017 107 100 - 108 mmol/L Final   01/04/2016 103 98 - 108 mmol/L Final   12/22/2015 103 98 - 108 mmol/L Final     CO2   Date Value Ref Range Status   01/23/2018 26 21 - 32 mmol/L Final   12/27/2017 26 21 - 32 mmol/L Final   11/24/2017 26 21 - 32 mmol/L Final   01/04/2016 27.2 21.0 - 32.0 mmol/L Final   12/22/2015 26.7 21.0 - 32.0 mmol/L Final     BUN   Date Value Ref Range Status   01/23/2018 14 5 - 25 mg/dL Final   12/27/2017 12 5 - 25 mg/dL Final   11/24/2017 11 5 - 25 mg/dL Final   01/04/2016 20 5 - 25 mg/dL Final   12/22/2015 17 5 - 25 mg/dL Final     Creatinine   Date Value Ref Range Status   01/23/2018 1.21 0.60 - 1.30 mg/dL Final     Comment:     Standardized to IDMS reference method   12/27/2017 1.14 0.60 - 1.30 mg/dL Final     Comment:     Standardized to IDMS reference method   11/24/2017 1.11 0.60 - 1.30 mg/dL Final     Comment:     Standardized to IDMS reference method 01/04/2016 1.11 0.60 - 1.30 mg/dL Final     Comment:     Standardized to IDMS reference method   12/22/2015 1.10 0.60 - 1.30 mg/dL Final     Comment:     Standardized to IDMS reference method     Glucose   Date Value Ref Range Status   01/23/2018 99 65 - 140 mg/dL Final     Comment:       If the patient is fasting, the ADA then defines impaired fasting glucose as > 100 mg/dL and diabetes as > or equal to 123 mg/dL. Specimen collection should occur prior to Sulfasalazine administration due to the potential for falsely depressed results. Specimen collection should occur prior to Sulfapyridine administration due to the potential for falsely elevated results. 12/27/2017 108 65 - 140 mg/dL Final     Comment:       If the patient is fasting, the ADA then defines impaired fasting glucose as > 100 mg/dL and diabetes as > or equal to 123 mg/dL. Specimen collection should occur prior to Sulfasalazine administration due to the potential for falsely depressed results. Specimen collection should occur prior to Sulfapyridine administration due to the potential for falsely elevated results. 11/24/2017 96 65 - 140 mg/dL Final     Comment:       If the patient is fasting, the ADA then defines impaired fasting glucose as > 100 mg/dL and diabetes as > or equal to 123 mg/dL. Specimen collection should occur prior to Sulfasalazine administration due to the potential for falsely depressed results. Specimen collection should occur prior to Sulfapyridine administration due to the potential for falsely elevated results.      Calcium   Date Value Ref Range Status   01/23/2018 8.7 8.3 - 10.1 mg/dL Final   12/27/2017 8.5 8.3 - 10.1 mg/dL Final   11/24/2017 8.1 (L) 8.3 - 10.1 mg/dL Final   01/04/2016 9.1 8.3 - 10.1 mg/dL Final   12/22/2015 8.8 8.3 - 10.1 mg/dL Final     Total Protein   Date Value Ref Range Status   01/04/2016 8.2 6.4 - 8.2 g/dL Final   12/22/2015 8.0 6.4 - 8.2 g/dL Final     Albumin   Date Value Ref Range Status 01/23/2018 3.0 (L) 3.5 - 5.0 g/dL Final   12/27/2017 3.0 (L) 3.5 - 5.0 g/dL Final   11/24/2017 3.0 (L) 3.5 - 5.0 g/dL Final   01/04/2016 4.2 3.5 - 5.0 g/dL Final   12/22/2015 4.1 3.5 - 5.0 g/dL Final     Total Bilirubin   Date Value Ref Range Status   01/23/2018 0.20 0.20 - 1.00 mg/dL Final   12/27/2017 0.30 0.20 - 1.00 mg/dL Final   11/24/2017 0.20 0.20 - 1.00 mg/dL Final     Alkaline Phosphatase   Date Value Ref Range Status   01/23/2018 77 46 - 116 U/L Final   12/27/2017 83 46 - 116 U/L Final   11/24/2017 72 46 - 116 U/L Final   01/04/2016 94 46 - 116 U/L Final   12/22/2015 95 46 - 116 U/L Final     AST   Date Value Ref Range Status   01/23/2018 23 5 - 45 U/L Final     Comment:       Specimen collection should occur prior to Sulfasalazine administration due to the potential for falsely depressed results. 12/27/2017 18 5 - 45 U/L Final     Comment:       Specimen collection should occur prior to Sulfasalazine administration due to the potential for falsely depressed results. 11/24/2017 21 5 - 45 U/L Final     Comment:       Specimen collection should occur prior to Sulfasalazine administration due to the potential for falsely depressed results. 01/04/2016 14 5 - 45 U/L Final   12/22/2015 15 5 - 45 U/L Final     ALT   Date Value Ref Range Status   01/23/2018 40 12 - 78 U/L Final     Comment:       Specimen collection should occur prior to Sulfasalazine administration due to the potential for falsely depressed results. 12/27/2017 22 12 - 78 U/L Final     Comment:       Specimen collection should occur prior to Sulfasalazine administration due to the potential for falsely depressed results. 11/24/2017 25 12 - 78 U/L Final     Comment:       Specimen collection should occur prior to Sulfasalazine administration due to the potential for falsely depressed results.     01/04/2016 20 12 - 78 U/L Final   12/22/2015 12 12 - 78 U/L Final      LD (LDH)   Date Value Ref Range Status   01/04/2016 236 (H) 81 - 234 U/L Final     Comment:     The above 1 analytes were performed by [facility] ,# [id number]     12/22/2015 235 (H) 81 - 234 U/L Final     Comment:     The above 1 analytes were performed by [facility] [id number]       LD   Date Value Ref Range Status   12/27/2017 175 81 - 234 U/L Final   11/24/2017 224 81 - 234 U/L Final   11/06/2017 188 81 - 234 U/L Final     No results found for: "TSH"  No results found for: "O4QNOBU"   Free T4   Date Value Ref Range Status   01/23/2018 1.14 0.76 - 1.46 ng/dL Final     Comment:       Specimen collection should occur prior to Sulfasalazine administration due to the potential for falsely elevated results. 12/27/2017 0.95 0.76 - 1.46 ng/dL Final     Comment:       Specimen collection should occur prior to Sulfasalazine administration due to the potential for falsely elevated results. 11/24/2017 1.02 0.76 - 1.46 ng/dL Final     Comment:       Specimen collection should occur prior to Sulfasalazine administration due to the potential for falsely elevated results. 12/22/2015 0.81 0.76 - 1.46 ng/dL Final     Comment:     The above 1 [place] [facility] [place] [id number]           RECENT IMAGING:  No results found. Assessment/Plan  Mr. Link Garcia is a 60 yr male with metastatic melanoma originally diagnosed in 2015 and completed treatment in 2017 who is [employer] and here for follow up, monitoring, and surveillance. 1. Malignant melanoma, unspecified site ([address])  2. Metastatic melanoma ([address])  3. Malignant melanoma metastatic to brain [facility])  He is doing well and has no clinical evidence of disease. Labs reviewed and ok. ON exam, no concerning skin lesions and no evidence of disease recurrence. He will continue with yearly follow up with blood work. We can image if clinically necessary. Orders placed for blood work for return visit.       Mr. Link Garcia knows to call with any issues or concerns prior to his next visit. - CBC and differential; Future  - Comprehensive metabolic panel; Future  - LD,Blood; Future  - TSH, 3rd generation; Future  - T3, free; Future  - T4, free; Future        Return in about 1 year (around 7/6/2024) for Office Visit, labs.      Sajan Parker MD, PhD

## 2023-07-06 NOTE — LETTER
July 6, 2023     Donna Wallace, [address]  [address] [address]    Patient: Ruby Jeong   YOB: 1960   Date of Visit: 7/6/2023       Dear Dr. Bebeto Adame: Thank you for referring Ruby Jeong to me for evaluation. Below are my notes for this consultation. If you have questions, please do not hesitate to call me. I look forward to following your patient along with you. Sincerely,        Maurilio Mandujano MD        CC: No Recipients    Maurilio Mandujano MD  7/6/2023  5:22 PM  Sign when Signing Visit  [address] [facility] [address]  [address]  [phone]     Date of Visit: 7/6/2023  Name: Ruby Jeong   YOB: 1960        Subjective    VISIT DIAGNOSIS:  Diagnoses and all orders for this visit:    Malignant melanoma, unspecified site ([address])  -     CBC and differential; Future  -     Comprehensive metabolic panel; Future  -     LD,Blood; Future  -     TSH, 3rd generation; Future  -     T3, free; Future  -     T4, free; Future    Metastatic melanoma (HCC)  -     CBC and differential; Future  -     Comprehensive metabolic panel; Future  -     LD,Blood; Future  -     TSH, 3rd generation; Future  -     T3, free; Future  -     T4, free; Future    Malignant melanoma metastatic to brain ([address])  -     CBC and differential; Future  -     Comprehensive metabolic panel; Future  -     LD,Blood; Future  -     TSH, 3rd generation; Future  -     T3, free; Future  -     T4, free;  Future    Other orders  -     Synthroid 125 MCG tablet        Oncology History   Metastatic melanoma ([address])   1/1/2015 -  Cancer Staged    Staging form: Melanoma of the Skin, AJCC 7th Edition  - Clinical stage from 1/1/2015: Stage IV (T0, N0, M1c) - Signed by Maurilio Mandujano MD on 12/4/2022  Biopsy of metastatic site performed: Yes       1/1/2015 - 1/1/2017 Chemotherapy    Treated on clinic trial with combination ipilimumab and nivolumab followed by maintenance nivolumab        Cancer Staging   Metastatic melanoma ([address] )  Staging form: Melanoma of the Skin, AJCC 7th Edition  - Clinical stage from 1/1/2015: Stage IV (T0, N0, M1c) - Signed by Rebeka Hopkins MD on 12/4/2022          HISTORY OF PRESENT ILLNESS: Brittany Sorto is a 60 y.o. male  who has metastatic melanoma with no evidence of disease here for continued monitoring, follow up, and surveillance. He is doing well and feels good. No issues, concerns, complaints today. He denies new, changing, and concerning skin lesions. Denies LAD. Energy is good. Eating well. REVIEW OF SYSTEMS:  Review of Systems   Constitutional: Negative for appetite change, fatigue, fever and unexpected weight change. HENT:   Negative for lump/mass. Eyes: Negative for icterus. Respiratory: Negative for cough, shortness of breath and wheezing. Cardiovascular: Negative for leg swelling. Gastrointestinal: Negative for abdominal pain, constipation, diarrhea, nausea and vomiting. Genitourinary: Negative for difficulty urinating and hematuria. Musculoskeletal: Negative for arthralgias, gait problem and myalgias. Skin: Negative for itching and rash. No new, changing, or concerning lesions. + vitiligo   Neurological: Negative for extremity weakness, gait problem, headaches, light-headedness and numbness. Hematological: Negative for adenopathy.         MEDICATIONS:    Current Outpatient Medications:   •  Synthroid 125 MCG tablet, , Disp: , Rfl:   •  Synthroid 150 MCG tablet, Take 150 mcg by mouth daily , Disp: , Rfl:      ALLERGIES:  Allergies   Allergen Reactions   • Iodinated Contrast Media Itching and Swelling        ACTIVE PROBLEMS:  Patient Active Problem List   Diagnosis   • Metastatic melanoma ([address] )   • Malignant melanoma metastatic to brain [facility])   • Lung nodules   • LAD (lymphadenopathy), intra-abdominal          PAST MEDICAL HISTORY:   Past Medical History: Diagnosis Date   • Brain metastasis    • Melanoma ([address])         PAST SURGICAL HISTORY:  Past Surgical History:   Procedure Laterality Date   • APPENDECTOMY     • CARPAL TUNNEL RELEASE     • JOINT REPLACEMENT      bilat knee replacement 2009   • POSTERIOR FUSION CERVICAL SPINE     • ROTATOR CUFF REPAIR          SOCIAL HISTORY:  Social History     Socioeconomic History   • Marital status: /Civil Union     Spouse name: None   • Number of children: None   • Years of education: None   • Highest education level: None   Occupational History   • None   Tobacco Use   • Smoking status: Never   • Smokeless tobacco: Never   Substance and Sexual Activity   • Alcohol use: No   • Drug use: No   • Sexual activity: None   Other Topics Concern   • None   Social History Narrative   • None     Social Determinants of Health     Financial Resource Strain: Not on file   Food Insecurity: Not on file   Transportation Needs: Not on file   Physical Activity: Not on file   Stress: Not on file   Social Connections: Not on file   Intimate Partner Violence: Not on file   Housing Stability: Not on file        FAMILY HISTORY:  History reviewed. No pertinent family history. Objective    PHYSICAL EXAMINATION:   Blood pressure 160/88, pulse 82, temperature 98.2 °F (36.8 °C), temperature source Temporal, height 6' 1" (1.854 m), weight 105 kg (231 lb 8 oz), SpO2 97 %. Pain Score: 0-No pain     ECOG Performance Status      Flowsheet Row Most Recent Value   ECOG Performance Status 0 - Fully active, able to carry on all pre-disease performance without restriction               Physical Exam  Constitutional:       General: He is not in acute distress. Appearance: Normal appearance. He is not toxic-appearing. HENT:      Right Ear: External ear normal.      Left Ear: External ear normal.      Nose: Nose normal.      Mouth/Throat:      Mouth: Mucous membranes are moist.      Pharynx: Oropharynx is clear.    Eyes:      General: No scleral icterus. Right eye: No discharge. Left eye: No discharge. Conjunctiva/sclera: Conjunctivae normal.   Cardiovascular:      Rate and Rhythm: Normal rate and regular rhythm. Pulses: Normal pulses. Heart sounds: No murmur heard. No friction rub. No gallop. Pulmonary:      Effort: Pulmonary effort is normal. No respiratory distress. Breath sounds: Normal breath sounds. No wheezing or rales. Abdominal:      General: Bowel sounds are normal. There is no distension. Palpations: There is no mass. Tenderness: There is no abdominal tenderness. There is no rebound. Musculoskeletal:         General: No swelling or tenderness. Normal range of motion. Cervical back: Normal range of motion. No rigidity. Right lower leg: No edema. Left lower leg: No edema. Lymphadenopathy:      Head:      Right side of head: No submandibular, preauricular or posterior auricular adenopathy. Left side of head: No submandibular, preauricular or posterior auricular adenopathy. Cervical: No cervical adenopathy. Right cervical: No superficial or posterior cervical adenopathy. Left cervical: No superficial or posterior cervical adenopathy. Upper Body:      Right upper body: No supraclavicular or axillary adenopathy. Left upper body: No supraclavicular or axillary adenopathy. Lower Body: No right inguinal adenopathy. No left inguinal adenopathy. Skin:     General: Skin is warm. Coloration: Skin is not jaundiced. Findings: No lesion or rash. Comments: No concerning skin lesions. + vitiligo, diffuse. Neurological:      General: No focal deficit present. Mental Status: He is alert and oriented to person, place, and time. Cranial Nerves: No cranial nerve deficit. Motor: No weakness.       Gait: Gait normal.   Psychiatric:         Mood and Affect: Mood normal.         Behavior: Behavior normal.         Thought Content: Thought content normal.         Judgment: Judgment normal.         I reviewed lab data in the chart.     WBC   Date Value Ref Range Status   01/23/2018 6.85 4.31 - 10.16 Thousand/uL Final   12/27/2017 7.31 4.31 - 10.16 Thousand/uL Final   11/24/2017 8.57 4.31 - 10.16 Thousand/uL Final   01/04/2016 6.51 4.31 - 10.16 Thousand/uL Final   12/22/2015 6.75 4.31 - 10.16 Thousand/uL Final     Hemoglobin   Date Value Ref Range Status   01/23/2018 9.9 (L) 12.0 - 17.0 g/dL Final   12/27/2017 10.6 (L) 12.0 - 17.0 g/dL Final   11/24/2017 11.7 (L) 12.0 - 17.0 g/dL Final   01/04/2016 14.6 12.0 - 17.0 g/dL Final   12/22/2015 13.9 12.0 - 17.0 g/dL Final     Platelets   Date Value Ref Range Status   01/23/2018 382 149 - 390 Thousands/uL Final   12/27/2017 376 149 - 390 Thousands/uL Final   11/24/2017 384 149 - 390 Thousands/uL Final   01/04/2016 317 149 - 390 Thousand/uL Final   12/22/2015 311 149 - 390 Thousand/uL Final     MCV   Date Value Ref Range Status   01/23/2018 79 (L) 82 - 98 fL Final   12/27/2017 82 82 - 98 fL Final   11/24/2017 86 82 - 98 fL Final   01/04/2016 83 82 - 98 fL Final   12/22/2015 84 82 - 98 fL Final      Sodium   Date Value Ref Range Status   01/04/2016 138 136 - 145 mmol/L Final   12/22/2015 139 136 - 145 mmol/L Final     Potassium   Date Value Ref Range Status   01/23/2018 4.3 3.5 - 5.3 mmol/L Final   12/27/2017 3.7 3.5 - 5.3 mmol/L Final   11/24/2017 4.0 3.5 - 5.3 mmol/L Final   01/04/2016 4.7 3.5 - 5.3 mmol/L Final   12/22/2015 3.9 3.5 - 5.3 mmol/L Final     Chloride   Date Value Ref Range Status   01/23/2018 107 100 - 108 mmol/L Final   12/27/2017 110 (H) 100 - 108 mmol/L Final   11/24/2017 107 100 - 108 mmol/L Final   01/04/2016 103 98 - 108 mmol/L Final   12/22/2015 103 98 - 108 mmol/L Final     CO2   Date Value Ref Range Status   01/23/2018 26 21 - 32 mmol/L Final   12/27/2017 26 21 - 32 mmol/L Final   11/24/2017 26 21 - 32 mmol/L Final   01/04/2016 27.2 21.0 - 32.0 mmol/L Final   12/22/2015 26.7 21.0 - 32.0 mmol/L Final     BUN   Date Value Ref Range Status   01/23/2018 14 5 - 25 mg/dL Final   12/27/2017 12 5 - 25 mg/dL Final   11/24/2017 11 5 - 25 mg/dL Final   01/04/2016 20 5 - 25 mg/dL Final   12/22/2015 17 5 - 25 mg/dL Final     Creatinine   Date Value Ref Range Status   01/23/2018 1.21 0.60 - 1.30 mg/dL Final     Comment:     Standardized to IDMS reference method   12/27/2017 1.14 0.60 - 1.30 mg/dL Final     Comment:     Standardized to IDMS reference method   11/24/2017 1.11 0.60 - 1.30 mg/dL Final     Comment:     Standardized to IDMS reference method   01/04/2016 1.11 0.60 - 1.30 mg/dL Final     Comment:     Standardized to IDMS reference method   12/22/2015 1.10 0.60 - 1.30 mg/dL Final     Comment:     Standardized to IDMS reference method     Glucose   Date Value Ref Range Status   01/23/2018 99 65 - 140 mg/dL Final     Comment:       If the patient is fasting, the ADA then defines impaired fasting glucose as > 100 mg/dL and diabetes as > or equal to 123 mg/dL. Specimen collection should occur prior to Sulfasalazine administration due to the potential for falsely depressed results. Specimen collection should occur prior to Sulfapyridine administration due to the potential for falsely elevated results. 12/27/2017 108 65 - 140 mg/dL Final     Comment:       If the patient is fasting, the ADA then defines impaired fasting glucose as > 100 mg/dL and diabetes as > or equal to 123 mg/dL. Specimen collection should occur prior to Sulfasalazine administration due to the potential for falsely depressed results. Specimen collection should occur prior to Sulfapyridine administration due to the potential for falsely elevated results. 11/24/2017 96 65 - 140 mg/dL Final     Comment:       If the patient is fasting, the ADA then defines impaired fasting glucose as > 100 mg/dL and diabetes as > or equal to 123 mg/dL.   Specimen collection should occur prior to Sulfasalazine administration due to the potential for falsely depressed results. Specimen collection should occur prior to Sulfapyridine administration due to the potential for falsely elevated results. Calcium   Date Value Ref Range Status   01/23/2018 8.7 8.3 - 10.1 mg/dL Final   12/27/2017 8.5 8.3 - 10.1 mg/dL Final   11/24/2017 8.1 (L) 8.3 - 10.1 mg/dL Final   01/04/2016 9.1 8.3 - 10.1 mg/dL Final   12/22/2015 8.8 8.3 - 10.1 mg/dL Final     Total Protein   Date Value Ref Range Status   01/04/2016 8.2 6.4 - 8.2 g/dL Final   12/22/2015 8.0 6.4 - 8.2 g/dL Final     Albumin   Date Value Ref Range Status   01/23/2018 3.0 (L) 3.5 - 5.0 g/dL Final   12/27/2017 3.0 (L) 3.5 - 5.0 g/dL Final   11/24/2017 3.0 (L) 3.5 - 5.0 g/dL Final   01/04/2016 4.2 3.5 - 5.0 g/dL Final   12/22/2015 4.1 3.5 - 5.0 g/dL Final     Total Bilirubin   Date Value Ref Range Status   01/23/2018 0.20 0.20 - 1.00 mg/dL Final   12/27/2017 0.30 0.20 - 1.00 mg/dL Final   11/24/2017 0.20 0.20 - 1.00 mg/dL Final     Alkaline Phosphatase   Date Value Ref Range Status   01/23/2018 77 46 - 116 U/L Final   12/27/2017 83 46 - 116 U/L Final   11/24/2017 72 46 - 116 U/L Final   01/04/2016 94 46 - 116 U/L Final   12/22/2015 95 46 - 116 U/L Final     AST   Date Value Ref Range Status   01/23/2018 23 5 - 45 U/L Final     Comment:       Specimen collection should occur prior to Sulfasalazine administration due to the potential for falsely depressed results. 12/27/2017 18 5 - 45 U/L Final     Comment:       Specimen collection should occur prior to Sulfasalazine administration due to the potential for falsely depressed results. 11/24/2017 21 5 - 45 U/L Final     Comment:       Specimen collection should occur prior to Sulfasalazine administration due to the potential for falsely depressed results.     01/04/2016 14 5 - 45 U/L Final   12/22/2015 15 5 - 45 U/L Final     ALT   Date Value Ref Range Status   01/23/2018 40 12 - 78 U/L Final     Comment:       Specimen collection should occur prior to Sulfasalazine administration due to the potential for falsely depressed results. 12/27/2017 22 12 - 78 U/L Final     Comment:       Specimen collection should occur prior to Sulfasalazine administration due to the potential for falsely depressed results. 11/24/2017 25 12 - 78 U/L Final     Comment:       Specimen collection should occur prior to Sulfasalazine administration due to the potential for falsely depressed results. 01/04/2016 20 12 - 78 U/L Final   12/22/2015 12 12 - 78 U/L Final      LD (LDH)   Date Value Ref Range Status   01/04/2016 236 (H) 81 - 234 U/L Final     Comment:     The above 1 analytes were performed by [facility] [id number]     12/22/2015 235 (H) 81 - 234 U/L Final     Comment:     The above 1 analytes were performed by [facility] [id number]       LD   Date Value Ref Range Status   12/27/2017 175 81 - 234 U/L Final   11/24/2017 224 81 - 234 U/L Final   11/06/2017 188 81 - 234 U/L Final     No results found for: "TSH"  No results found for: "A5HGTHV"   Free T4   Date Value Ref Range Status   01/23/2018 1.14 0.76 - 1.46 ng/dL Final     Comment:       Specimen collection should occur prior to Sulfasalazine administration due to the potential for falsely elevated results. 12/27/2017 0.95 0.76 - 1.46 ng/dL Final     Comment:       Specimen collection should occur prior to Sulfasalazine administration due to the potential for falsely elevated results. 11/24/2017 1.02 0.76 - 1.46 ng/dL Final     Comment:       Specimen collection should occur prior to Sulfasalazine administration due to the potential for falsely elevated results. 12/22/2015 0.81 0.76 - 1.46 ng/dL Final     Comment:     The above 1 analytes were performed by [facility] [id number]           RECENT IMAGING:  No results found.           Assessment/Plan  Mr. Bert Lopes is a 60 yr male with metastatic melanoma originally diagnosed in 1 and completed treatment in 2017 who is [employer] and here for follow up, monitoring, and surveillance. 1. Malignant melanoma, unspecified site ([address])  2. Metastatic melanoma ([address])  3. Malignant melanoma metastatic to brain [facility])  He is doing well and has no clinical evidence of disease. Labs reviewed and ok. ON exam, no concerning skin lesions and no evidence of disease recurrence. He will continue with yearly follow up with blood work. We can image if clinically necessary. Orders placed for blood work for return visit. Mr. Xenia Fagan knows to call with any issues or concerns prior to his next visit. - CBC and differential; Future  - Comprehensive metabolic panel; Future  - LD,Blood; Future  - TSH, 3rd generation; Future  - T3, free; Future  - T4, free; Future        Return in about 1 year (around 7/6/2024) for Office Visit, labs.      Jacki Blanco MD, PhD

## 2024-07-10 ENCOUNTER — OFFICE VISIT (OUTPATIENT)
Dept: HEMATOLOGY ONCOLOGY | Facility: CLINIC | Age: 64
End: 2024-07-10
Payer: MEDICARE

## 2024-07-10 VITALS
TEMPERATURE: 98.3 F | SYSTOLIC BLOOD PRESSURE: 136 MMHG | BODY MASS INDEX: 30.67 KG/M2 | HEART RATE: 88 BPM | HEIGHT: 74 IN | WEIGHT: 239 LBS | OXYGEN SATURATION: 96 % | DIASTOLIC BLOOD PRESSURE: 74 MMHG | RESPIRATION RATE: 18 BRPM

## 2024-07-10 DIAGNOSIS — C43.9 METASTATIC MELANOMA (HCC): ICD-10-CM

## 2024-07-10 DIAGNOSIS — Z85.820 ENCOUNTER FOR FOLLOW-UP SURVEILLANCE OF MELANOMA: Primary | ICD-10-CM

## 2024-07-10 DIAGNOSIS — Z79.899 HIGH RISK MEDICATION USE: ICD-10-CM

## 2024-07-10 DIAGNOSIS — Z08 ENCOUNTER FOR FOLLOW-UP SURVEILLANCE OF MELANOMA: Primary | ICD-10-CM

## 2024-07-10 DIAGNOSIS — C79.31 MALIGNANT MELANOMA METASTATIC TO BRAIN (HCC): ICD-10-CM

## 2024-07-10 DIAGNOSIS — Z79.899 HIGH RISK MEDICATION USE: Primary | ICD-10-CM

## 2024-07-10 PROCEDURE — G2211 COMPLEX E/M VISIT ADD ON: HCPCS | Performed by: INTERNAL MEDICINE

## 2024-07-10 PROCEDURE — 99213 OFFICE O/P EST LOW 20 MIN: CPT | Performed by: INTERNAL MEDICINE

## 2024-07-10 NOTE — LETTER
July 14, 2024     Nola Simpson MD  [address]  [address]  [facility] [address]    Patient: Luca Goode   YOB: 1960   Date of Visit: 7/10/2024       Dear Dr. Simpson:    Thank you for referring Luca Goode to me for evaluation. Below are my notes for this consultation.    If you have questions, please do not hesitate to call me. I look forward to following your patient along with you.         Sincerely,        Brooke Shepherd MD        CC: No Recipients    Brooke Shepherd MD  7/14/2024  4:32 PM  Sign when Signing Visit  [facility] [place]  [address] [address]  [phone]  [phone]     Date of Visit: 7/10/2024  Name: Luca Goode   YOB: 1960        Subjective    VISIT DIAGNOSIS:  Diagnoses and all orders for this visit:    Encounter for follow-up surveillance of melanoma    Metastatic melanoma (HCC)    Malignant melanoma metastatic to brain (HCC)    High risk medication use        Oncology History   Metastatic melanoma (HCC)   1/1/2015 -  Cancer Staged    Staging form: Melanoma of the Skin, AJCC 7th Edition  - Clinical stage from 1/1/2015: Stage IV (T0, N0, M1c) - Signed by Brooke Shepherd MD on 12/4/2022  Biopsy of metastatic site performed: Yes       1/1/2015 - 1/1/2017 Chemotherapy    Treated on clinic trial with combination ipilimumab and nivolumab followed by maintenance nivolumab        Cancer Staging   Metastatic melanoma (HCC)  Staging form: Melanoma of the Skin, AJCC 7th Edition  - Clinical stage from 1/1/2015: Stage IV (T0, N0, M1c) - Signed by Brooke Shepherd MD on 12/4/2022          HISTORY OF PRESENT ILLNESS: Luca Goode is a 63 y.o. male  who has metastatic melanoma status posttreatment with immunotherapy and is now 9 years out from diagnosis here for continued monitoring, follow-up and surveillance.    He is doing well.  Feels good.  Continues to travel in his RV with his home-based  here in [place] with his family.  Denies any new, changing, concerning skin lesions.  Denies any lymphadenopathy.  No concerns or complaints today.        REVIEW OF SYSTEMS:  Review of Systems   Constitutional:  Negative for appetite change, fatigue, fever and unexpected weight change.   HENT:   Negative for lump/mass, trouble swallowing and voice change.    Eyes:  Negative for icterus.   Respiratory:  Negative for cough, shortness of breath and wheezing.    Cardiovascular:  Negative for leg swelling.   Gastrointestinal:  Negative for abdominal pain, constipation, diarrhea, nausea and vomiting.   Genitourinary:  Negative for difficulty urinating and hematuria.    Musculoskeletal:  Negative for arthralgias, gait problem and myalgias.   Skin:  Negative for itching and rash.        No new, changing, or concerning lesions.   Neurological:  Negative for extremity weakness, gait problem, headaches, light-headedness and numbness.   Hematological:  Negative for adenopathy.        MEDICATIONS:    Current Outpatient Medications:   •  Synthroid 125 MCG tablet, , Disp: , Rfl:   •  Synthroid 150 MCG tablet, Take 150 mcg by mouth daily , Disp: , Rfl:      ALLERGIES:  Allergies   Allergen Reactions   • Iodinated Contrast Media Itching and Swelling        ACTIVE PROBLEMS:  Patient Active Problem List   Diagnosis   • Metastatic melanoma (HCC)   • Malignant melanoma metastatic to brain (HCC)   • Lung nodules   • LAD (lymphadenopathy), intra-abdominal          PAST MEDICAL HISTORY:   Past Medical History:   Diagnosis Date   • Brain metastasis    • Melanoma (HCC)         PAST SURGICAL HISTORY:  Past Surgical History:   Procedure Laterality Date   • APPENDECTOMY     • CARPAL TUNNEL RELEASE     • JOINT REPLACEMENT      bilat knee replacement 2009   • POSTERIOR FUSION CERVICAL SPINE     • ROTATOR CUFF REPAIR          SOCIAL HISTORY:  Social History     Socioeconomic History   • Marital status: /Civil Union     Spouse name: Not  "on file   • Number of children: Not on file   • Years of education: Not on file   • Highest education level: Not on file   Occupational History   • Not on file   Tobacco Use   • Smoking status: Never   • Smokeless tobacco: Never   Substance and Sexual Activity   • Alcohol use: No   • Drug use: No   • Sexual activity: Not on file   Other Topics Concern   • Not on file   Social History Narrative   • Not on file     Social Determinants of Health     Financial Resource Strain: Not on file   Food Insecurity: No Food Insecurity (12/5/2022)    Received from [facility]    Hunger Vital Sign    • Worried About Running Out of Food in the Last Year: Never true    • Ran Out of Food in the Last Year: Never true   Transportation Needs: No Transportation Needs (12/5/2022)    Received from [facility]    PRAPARE - Transportation    • Lack of Transportation (Medical): No    • Lack of Transportation (Non-Medical): No   Physical Activity: Not on file   Stress: Not on file   Social Connections: Not on file   Intimate Partner Violence: Not on file   Housing Stability: Not on file        FAMILY HISTORY:  No family history on file.        Objective    PHYSICAL EXAMINATION:   Blood pressure 136/74, pulse 88, temperature 98.3 °F (36.8 °C), temperature source Temporal, resp. rate 18, height 6' 2\" (1.88 m), weight 108 kg (239 lb), SpO2 96%.     Pain Score: 0-No pain     ECOG Performance Status      Flowsheet Row Most Recent Value   ECOG Performance Status 0 - Fully active, able to carry on all pre-disease performance without restriction               Physical Exam  Constitutional:       General: He is not in acute distress.     Appearance: Normal appearance. He is not ill-appearing or toxic-appearing.   HENT:      Head: Normocephalic and atraumatic.      Right Ear: External ear normal.      Left Ear: External ear normal.      Nose: Nose normal.      Mouth/Throat:      Mouth: Mucous membranes are moist.      Pharynx: Oropharynx is " clear.   Eyes:      General: No scleral icterus.        Right eye: No discharge.         Left eye: No discharge.      Conjunctiva/sclera: Conjunctivae normal.   Cardiovascular:      Rate and Rhythm: Normal rate and regular rhythm.      Pulses: Normal pulses.      Heart sounds: No murmur heard.     No friction rub. No gallop.   Pulmonary:      Effort: Pulmonary effort is normal. No respiratory distress.      Breath sounds: Normal breath sounds. No wheezing or rales.   Abdominal:      General: Bowel sounds are normal. There is no distension.      Palpations: There is no mass.      Tenderness: There is no abdominal tenderness. There is no rebound.   Musculoskeletal:         General: No swelling or tenderness.      Cervical back: Normal range of motion. No rigidity.      Right lower leg: No edema.      Left lower leg: No edema.   Lymphadenopathy:      Head:      Right side of head: No submandibular, preauricular or posterior auricular adenopathy.      Left side of head: No submandibular, preauricular or posterior auricular adenopathy.      Cervical: No cervical adenopathy.      Right cervical: No superficial or posterior cervical adenopathy.     Left cervical: No superficial or posterior cervical adenopathy.      Upper Body:      Right upper body: No supraclavicular or axillary adenopathy.      Left upper body: No supraclavicular or axillary adenopathy.      Lower Body: No right inguinal adenopathy. No left inguinal adenopathy.   Skin:     General: Skin is warm.      Coloration: Skin is not jaundiced.      Findings: No lesion or rash.      Comments: Well healed surgical scar.  No evidence of recurrence at primary site.   Neurological:      General: No focal deficit present.      Mental Status: He is alert and oriented to person, place, and time.      Cranial Nerves: No cranial nerve deficit.      Motor: No weakness.      Gait: Gait normal.   Psychiatric:         Mood and Affect: Mood normal.         Behavior: Behavior  normal.         Thought Content: Thought content normal.         Judgment: Judgment normal.         I reviewed lab data in the chart.    WBC   Date Value Ref Range Status   01/23/2018 6.85 4.31 - 10.16 Thousand/uL Final   12/27/2017 7.31 4.31 - 10.16 Thousand/uL Final   11/24/2017 8.57 4.31 - 10.16 Thousand/uL Final   01/04/2016 6.51 4.31 - 10.16 Thousand/uL Final   12/22/2015 6.75 4.31 - 10.16 Thousand/uL Final     Hemoglobin   Date Value Ref Range Status   01/23/2018 9.9 (L) 12.0 - 17.0 g/dL Final   12/27/2017 10.6 (L) 12.0 - 17.0 g/dL Final   11/24/2017 11.7 (L) 12.0 - 17.0 g/dL Final   01/04/2016 14.6 12.0 - 17.0 g/dL Final   12/22/2015 13.9 12.0 - 17.0 g/dL Final     Platelets   Date Value Ref Range Status   01/23/2018 382 149 - 390 Thousands/uL Final   12/27/2017 376 149 - 390 Thousands/uL Final   11/24/2017 384 149 - 390 Thousands/uL Final   01/04/2016 317 149 - 390 Thousand/uL Final   12/22/2015 311 149 - 390 Thousand/uL Final     MCV   Date Value Ref Range Status   01/23/2018 79 (L) 82 - 98 fL Final   12/27/2017 82 82 - 98 fL Final   11/24/2017 86 82 - 98 fL Final   01/04/2016 83 82 - 98 fL Final   12/22/2015 84 82 - 98 fL Final      Sodium   Date Value Ref Range Status   01/04/2016 138 136 - 145 mmol/L Final   12/22/2015 139 136 - 145 mmol/L Final     Potassium   Date Value Ref Range Status   01/23/2018 4.3 3.5 - 5.3 mmol/L Final   12/27/2017 3.7 3.5 - 5.3 mmol/L Final   11/24/2017 4.0 3.5 - 5.3 mmol/L Final   01/04/2016 4.7 3.5 - 5.3 mmol/L Final   12/22/2015 3.9 3.5 - 5.3 mmol/L Final     Chloride   Date Value Ref Range Status   01/23/2018 107 100 - 108 mmol/L Final   12/27/2017 110 (H) 100 - 108 mmol/L Final   11/24/2017 107 100 - 108 mmol/L Final   01/04/2016 103 98 - 108 mmol/L Final   12/22/2015 103 98 - 108 mmol/L Final     CO2   Date Value Ref Range Status   01/23/2018 26 21 - 32 mmol/L Final   12/27/2017 26 21 - 32 mmol/L Final   11/24/2017 26 21 - 32 mmol/L Final   01/04/2016 27.2 21.0 - 32.0  mmol/L Final   12/22/2015 26.7 21.0 - 32.0 mmol/L Final     BUN   Date Value Ref Range Status   01/23/2018 14 5 - 25 mg/dL Final   12/27/2017 12 5 - 25 mg/dL Final   11/24/2017 11 5 - 25 mg/dL Final   01/04/2016 20 5 - 25 mg/dL Final   12/22/2015 17 5 - 25 mg/dL Final     Creatinine   Date Value Ref Range Status   01/23/2018 1.21 0.60 - 1.30 mg/dL Final     Comment:     Standardized to IDMS reference method   12/27/2017 1.14 0.60 - 1.30 mg/dL Final     Comment:     Standardized to IDMS reference method   11/24/2017 1.11 0.60 - 1.30 mg/dL Final     Comment:     Standardized to IDMS reference method   01/04/2016 1.11 0.60 - 1.30 mg/dL Final     Comment:     Standardized to IDMS reference method   12/22/2015 1.10 0.60 - 1.30 mg/dL Final     Comment:     Standardized to IDMS reference method     Glucose   Date Value Ref Range Status   01/23/2018 99 65 - 140 mg/dL Final     Comment:       If the patient is fasting, the ADA then defines impaired fasting glucose as > 100 mg/dL and diabetes as > or equal to 123 mg/dL.  Specimen collection should occur prior to Sulfasalazine administration due to the potential for falsely depressed results. Specimen collection should occur prior to Sulfapyridine administration due to the potential for falsely elevated results.   12/27/2017 108 65 - 140 mg/dL Final     Comment:       If the patient is fasting, the ADA then defines impaired fasting glucose as > 100 mg/dL and diabetes as > or equal to 123 mg/dL.  Specimen collection should occur prior to Sulfasalazine administration due to the potential for falsely depressed results. Specimen collection should occur prior to Sulfapyridine administration due to the potential for falsely elevated results.   11/24/2017 96 65 - 140 mg/dL Final     Comment:       If the patient is fasting, the ADA then defines impaired fasting glucose as > 100 mg/dL and diabetes as > or equal to 123 mg/dL.  Specimen collection should occur prior to Sulfasalazine  administration due to the potential for falsely depressed results. Specimen collection should occur prior to Sulfapyridine administration due to the potential for falsely elevated results.     Calcium   Date Value Ref Range Status   01/23/2018 8.7 8.3 - 10.1 mg/dL Final   12/27/2017 8.5 8.3 - 10.1 mg/dL Final   11/24/2017 8.1 (L) 8.3 - 10.1 mg/dL Final   01/04/2016 9.1 8.3 - 10.1 mg/dL Final   12/22/2015 8.8 8.3 - 10.1 mg/dL Final     Total Protein   Date Value Ref Range Status   01/04/2016 8.2 6.4 - 8.2 g/dL Final   12/22/2015 8.0 6.4 - 8.2 g/dL Final     Albumin   Date Value Ref Range Status   01/23/2018 3.0 (L) 3.5 - 5.0 g/dL Final   12/27/2017 3.0 (L) 3.5 - 5.0 g/dL Final   11/24/2017 3.0 (L) 3.5 - 5.0 g/dL Final   01/04/2016 4.2 3.5 - 5.0 g/dL Final   12/22/2015 4.1 3.5 - 5.0 g/dL Final     Total Bilirubin   Date Value Ref Range Status   01/23/2018 0.20 0.20 - 1.00 mg/dL Final   12/27/2017 0.30 0.20 - 1.00 mg/dL Final   11/24/2017 0.20 0.20 - 1.00 mg/dL Final     Alkaline Phosphatase   Date Value Ref Range Status   01/23/2018 77 46 - 116 U/L Final   12/27/2017 83 46 - 116 U/L Final   11/24/2017 72 46 - 116 U/L Final   01/04/2016 94 46 - 116 U/L Final   12/22/2015 95 46 - 116 U/L Final     AST   Date Value Ref Range Status   01/23/2018 23 5 - 45 U/L Final     Comment:       Specimen collection should occur prior to Sulfasalazine administration due to the potential for falsely depressed results.    12/27/2017 18 5 - 45 U/L Final     Comment:       Specimen collection should occur prior to Sulfasalazine administration due to the potential for falsely depressed results.    11/24/2017 21 5 - 45 U/L Final     Comment:       Specimen collection should occur prior to Sulfasalazine administration due to the potential for falsely depressed results.    01/04/2016 14 5 - 45 U/L Final   12/22/2015 15 5 - 45 U/L Final     ALT   Date Value Ref Range Status   01/23/2018 40 12 - 78 U/L Final     Comment:       Specimen  "collection should occur prior to Sulfasalazine administration due to the potential for falsely depressed results.    12/27/2017 22 12 - 78 U/L Final     Comment:       Specimen collection should occur prior to Sulfasalazine administration due to the potential for falsely depressed results.    11/24/2017 25 12 - 78 U/L Final     Comment:       Specimen collection should occur prior to Sulfasalazine administration due to the potential for falsely depressed results.    01/04/2016 20 12 - 78 U/L Final   12/22/2015 12 12 - 78 U/L Final      LD (LDH)   Date Value Ref Range Status   01/04/2016 236 (H) 81 - 234 U/L Final     Comment:     The above 1 analytes were performed by [facility]  [address] [place], [place] [address] [address]     12/22/2015 235 (H) 81 - 234 U/L Final     Comment:     The above 1 analytes were performed by [facility]man[address] [place], [place] [address] [address]       LD   Date Value Ref Range Status   12/27/2017 175 81 - 234 U/L Final   11/24/2017 224 81 - 234 U/L Final   11/06/2017 188 81 - 234 U/L Final     No results found for: \"TSH\"  No results found for: \"B1NXYPO\"   Free T4   Date Value Ref Range Status   01/23/2018 1.14 0.76 - 1.46 ng/dL Final     Comment:       Specimen collection should occur prior to Sulfasalazine administration due to the potential for falsely elevated results.   12/27/2017 0.95 0.76 - 1.46 ng/dL Final     Comment:       Specimen collection should occur prior to Sulfasalazine administration due to the potential for falsely elevated results.   11/24/2017 1.02 0.76 - 1.46 ng/dL Final     Comment:       Specimen collection should occur prior to Sulfasalazine administration due to the potential for falsely elevated results.   12/22/2015 0.81 0.76 - 1.46 ng/dL Final     Comment:     The above 1 analytes [facility]  [address] [place] [address],[place] [address]           RECENT IMAGING:  No results found.          Assessment/Plan  Mr. Gooed is a 63-year-old gentleman with stage IV " melanoma diagnosed 9 years ago status post treatment immunotherapy now here for continued monitoring, follow-up and surveillance.    1. Encounter for follow-up surveillance of melanoma  2. Metastatic melanoma (HCC)  3. Malignant melanoma metastatic to brain (HCC)  He is doing well with no clinical evidence of melanoma recurrence or metastasis.  He did have blood work done at an outside lab and it is visible in Care Everywhere.  All lab values are within normal limits or not clinically significant.  He knows to continue to monitor for any new, changing, concerning skin lesions or lymphadenopathy.  He knows to monitor for any new lumps or bumps.    He will return in 1 year for follow-up.  Orders placed and prescription provided for blood work to be done at that time.  He knows to call with any issues or concerns prior to his next visit.        4. High risk medication use    Previously was on immunotherapy so we continue to monitor thyroid functions.  TSH is stable and he is on a stable dose of levothyroxine.          Return in about 1 year (around 7/10/2025) for Office Visit, labs.     Brooke Shepherd MD, PhD

## 2024-07-14 NOTE — PROGRESS NOTES
[facility] [place]  [address] [facility] [address]  [phone]  [phone]     Date of Visit: 7/10/2024  Name: Luca Goode   YOB: 1960        Subjective    VISIT DIAGNOSIS:  Diagnoses and all orders for this visit:    Encounter for follow-up surveillance of melanoma    Metastatic melanoma (HCC)    Malignant melanoma metastatic to brain (HCC)    High risk medication use        Oncology History   Metastatic melanoma (HCC)   1/1/2015 -  Cancer Staged    Staging form: Melanoma of the Skin, AJCC 7th Edition  - Clinical stage from 1/1/2015: Stage IV (T0, N0, M1c) - Signed by Brooke Shepherd MD on 12/4/2022  Biopsy of metastatic site performed: Yes       1/1/2015 - 1/1/2017 Chemotherapy    Treated on clinic trial with combination ipilimumab and nivolumab followed by maintenance nivolumab        Cancer Staging   Metastatic melanoma (HCC)  Staging form: Melanoma of the Skin, AJCC 7th Edition  - Clinical stage from 1/1/2015: Stage IV (T0, N0, M1c) - Signed by Brooke Shepherd MD on 12/4/2022          HISTORY OF PRESENT ILLNESS: Luca Goode is a 63 y.o. male  who has metastatic melanoma status posttreatment with immunotherapy and is now 9 years out from diagnosis here for continued monitoring, follow-up and surveillance.    He is doing well.  Feels good.  Continues to travel in his RV with his home-based here in [place] with his family.  Denies any new, changing, concerning skin lesions.  Denies any lymphadenopathy.  No concerns or complaints today.        REVIEW OF SYSTEMS:  Review of Systems   Constitutional:  Negative for appetite change, fatigue, fever and unexpected weight change.   HENT:   Negative for lump/mass, trouble swallowing and voice change.    Eyes:  Negative for icterus.   Respiratory:  Negative for cough, shortness of breath and wheezing.    Cardiovascular:  Negative for leg swelling.   Gastrointestinal:  Negative for abdominal pain,  constipation, diarrhea, nausea and vomiting.   Genitourinary:  Negative for difficulty urinating and hematuria.    Musculoskeletal:  Negative for arthralgias, gait problem and myalgias.   Skin:  Negative for itching and rash.        No new, changing, or concerning lesions.   Neurological:  Negative for extremity weakness, gait problem, headaches, light-headedness and numbness.   Hematological:  Negative for adenopathy.        MEDICATIONS:    Current Outpatient Medications:     Synthroid 125 MCG tablet, , Disp: , Rfl:     Synthroid 150 MCG tablet, Take 150 mcg by mouth daily , Disp: , Rfl:      ALLERGIES:  Allergies   Allergen Reactions    Iodinated Contrast Media Itching and Swelling        ACTIVE PROBLEMS:  Patient Active Problem List   Diagnosis    Metastatic melanoma (HCC)    Malignant melanoma metastatic to brain (HCC)    Lung nodules    LAD (lymphadenopathy), intra-abdominal          PAST MEDICAL HISTORY:   Past Medical History:   Diagnosis Date    Brain metastasis     Melanoma (HCC)         PAST SURGICAL HISTORY:  Past Surgical History:   Procedure Laterality Date    APPENDECTOMY      CARPAL TUNNEL RELEASE      JOINT REPLACEMENT      bilat knee replacement 2009    POSTERIOR FUSION CERVICAL SPINE      ROTATOR CUFF REPAIR          SOCIAL HISTORY:  Social History     Socioeconomic History    Marital status: /Civil Union     Spouse name: Not on file    Number of children: Not on file    Years of education: Not on file    Highest education level: Not on file   Occupational History    Not on file   Tobacco Use    Smoking status: Never    Smokeless tobacco: Never   Substance and Sexual Activity    Alcohol use: No    Drug use: No    Sexual activity: Not on file   Other Topics Concern    Not on file   Social History Narrative    Not on file     Social Determinants of Health     Financial Resource Strain: Not on file   Food Insecurity: No Food Insecurity (12/5/2022)    Received from [facility]  "Vital Sign     Worried About Running Out of Food in the Last Year: Never true     Ran Out of Food in the Last Year: Never true   Transportation Needs: No Transportation Needs (12/5/2022)    Received [facility]     Lack of Transportation (Medical): No     Lack of Transportation (Non-Medical): No   Physical Activity: Not on file   Stress: Not on file   Social Connections: Not on file   Intimate Partner Violence: Not on file   Housing Stability: Not on file        FAMILY HISTORY:  No family history on file.        Objective    PHYSICAL EXAMINATION:   Blood pressure 136/74, pulse 88, temperature 98.3 °F (36.8 °C), temperature source Temporal, resp. rate 18, height 6' 2\" (1.88 m), weight 108 kg (239 lb), SpO2 96%.     Pain Score: 0-No pain     ECOG Performance Status      Flowsheet Row Most Recent Value   ECOG Performance Status 0 - Fully active, able to carry on all pre-disease performance without restriction               Physical Exam  Constitutional:       General: He is not in acute distress.     Appearance: Normal appearance. He is not ill-appearing or toxic-appearing.   HENT:      Head: Normocephalic and atraumatic.      Right Ear: External ear normal.      Left Ear: External ear normal.      Nose: Nose normal.      Mouth/Throat:      Mouth: Mucous membranes are moist.      Pharynx: Oropharynx is clear.   Eyes:      General: No scleral icterus.        Right eye: No discharge.         Left eye: No discharge.      Conjunctiva/sclera: Conjunctivae normal.   Cardiovascular:      Rate and Rhythm: Normal rate and regular rhythm.      Pulses: Normal pulses.      Heart sounds: No murmur heard.     No friction rub. No gallop.   Pulmonary:      Effort: Pulmonary effort is normal. No respiratory distress.      Breath sounds: Normal breath sounds. No wheezing or rales.   Abdominal:      General: Bowel sounds are normal. There is no distension.      Palpations: There is no mass.      " Tenderness: There is no abdominal tenderness. There is no rebound.   Musculoskeletal:         General: No swelling or tenderness.      Cervical back: Normal range of motion. No rigidity.      Right lower leg: No edema.      Left lower leg: No edema.   Lymphadenopathy:      Head:      Right side of head: No submandibular, preauricular or posterior auricular adenopathy.      Left side of head: No submandibular, preauricular or posterior auricular adenopathy.      Cervical: No cervical adenopathy.      Right cervical: No superficial or posterior cervical adenopathy.     Left cervical: No superficial or posterior cervical adenopathy.      Upper Body:      Right upper body: No supraclavicular or axillary adenopathy.      Left upper body: No supraclavicular or axillary adenopathy.      Lower Body: No right inguinal adenopathy. No left inguinal adenopathy.   Skin:     General: Skin is warm.      Coloration: Skin is not jaundiced.      Findings: No lesion or rash.      Comments: Well healed surgical scar.  No evidence of recurrence at primary site.   Neurological:      General: No focal deficit present.      Mental Status: He is alert and oriented to person, place, and time.      Cranial Nerves: No cranial nerve deficit.      Motor: No weakness.      Gait: Gait normal.   Psychiatric:         Mood and Affect: Mood normal.         Behavior: Behavior normal.         Thought Content: Thought content normal.         Judgment: Judgment normal.         I reviewed lab data in the chart.    WBC   Date Value Ref Range Status   01/23/2018 6.85 4.31 - 10.16 Thousand/uL Final   12/27/2017 7.31 4.31 - 10.16 Thousand/uL Final   11/24/2017 8.57 4.31 - 10.16 Thousand/uL Final   01/04/2016 6.51 4.31 - 10.16 Thousand/uL Final   12/22/2015 6.75 4.31 - 10.16 Thousand/uL Final     Hemoglobin   Date Value Ref Range Status   01/23/2018 9.9 (L) 12.0 - 17.0 g/dL Final   12/27/2017 10.6 (L) 12.0 - 17.0 g/dL Final   11/24/2017 11.7 (L) 12.0 - 17.0  g/dL Final   01/04/2016 14.6 12.0 - 17.0 g/dL Final   12/22/2015 13.9 12.0 - 17.0 g/dL Final     Platelets   Date Value Ref Range Status   01/23/2018 382 149 - 390 Thousands/uL Final   12/27/2017 376 149 - 390 Thousands/uL Final   11/24/2017 384 149 - 390 Thousands/uL Final   01/04/2016 317 149 - 390 Thousand/uL Final   12/22/2015 311 149 - 390 Thousand/uL Final     MCV   Date Value Ref Range Status   01/23/2018 79 (L) 82 - 98 fL Final   12/27/2017 82 82 - 98 fL Final   11/24/2017 86 82 - 98 fL Final   01/04/2016 83 82 - 98 fL Final   12/22/2015 84 82 - 98 fL Final      Sodium   Date Value Ref Range Status   01/04/2016 138 136 - 145 mmol/L Final   12/22/2015 139 136 - 145 mmol/L Final     Potassium   Date Value Ref Range Status   01/23/2018 4.3 3.5 - 5.3 mmol/L Final   12/27/2017 3.7 3.5 - 5.3 mmol/L Final   11/24/2017 4.0 3.5 - 5.3 mmol/L Final   01/04/2016 4.7 3.5 - 5.3 mmol/L Final   12/22/2015 3.9 3.5 - 5.3 mmol/L Final     Chloride   Date Value Ref Range Status   01/23/2018 107 100 - 108 mmol/L Final   12/27/2017 110 (H) 100 - 108 mmol/L Final   11/24/2017 107 100 - 108 mmol/L Final   01/04/2016 103 98 - 108 mmol/L Final   12/22/2015 103 98 - 108 mmol/L Final     CO2   Date Value Ref Range Status   01/23/2018 26 21 - 32 mmol/L Final   12/27/2017 26 21 - 32 mmol/L Final   11/24/2017 26 21 - 32 mmol/L Final   01/04/2016 27.2 21.0 - 32.0 mmol/L Final   12/22/2015 26.7 21.0 - 32.0 mmol/L Final     BUN   Date Value Ref Range Status   01/23/2018 14 5 - 25 mg/dL Final   12/27/2017 12 5 - 25 mg/dL Final   11/24/2017 11 5 - 25 mg/dL Final   01/04/2016 20 5 - 25 mg/dL Final   12/22/2015 17 5 - 25 mg/dL Final     Creatinine   Date Value Ref Range Status   01/23/2018 1.21 0.60 - 1.30 mg/dL Final     Comment:     Standardized to IDMS reference method   12/27/2017 1.14 0.60 - 1.30 mg/dL Final     Comment:     Standardized to IDMS reference method   11/24/2017 1.11 0.60 - 1.30 mg/dL Final     Comment:     Standardized to IDMS  reference method   01/04/2016 1.11 0.60 - 1.30 mg/dL Final     Comment:     Standardized to IDMS reference method   12/22/2015 1.10 0.60 - 1.30 mg/dL Final     Comment:     Standardized to IDMS reference method     Glucose   Date Value Ref Range Status   01/23/2018 99 65 - 140 mg/dL Final     Comment:       If the patient is fasting, the ADA then defines impaired fasting glucose as > 100 mg/dL and diabetes as > or equal to 123 mg/dL.  Specimen collection should occur prior to Sulfasalazine administration due to the potential for falsely depressed results. Specimen collection should occur prior to Sulfapyridine administration due to the potential for falsely elevated results.   12/27/2017 108 65 - 140 mg/dL Final     Comment:       If the patient is fasting, the ADA then defines impaired fasting glucose as > 100 mg/dL and diabetes as > or equal to 123 mg/dL.  Specimen collection should occur prior to Sulfasalazine administration due to the potential for falsely depressed results. Specimen collection should occur prior to Sulfapyridine administration due to the potential for falsely elevated results.   11/24/2017 96 65 - 140 mg/dL Final     Comment:       If the patient is fasting, the ADA then defines impaired fasting glucose as > 100 mg/dL and diabetes as > or equal to 123 mg/dL.  Specimen collection should occur prior to Sulfasalazine administration due to the potential for falsely depressed results. Specimen collection should occur prior to Sulfapyridine administration due to the potential for falsely elevated results.     Calcium   Date Value Ref Range Status   01/23/2018 8.7 8.3 - 10.1 mg/dL Final   12/27/2017 8.5 8.3 - 10.1 mg/dL Final   11/24/2017 8.1 (L) 8.3 - 10.1 mg/dL Final   01/04/2016 9.1 8.3 - 10.1 mg/dL Final   12/22/2015 8.8 8.3 - 10.1 mg/dL Final     Total Protein   Date Value Ref Range Status   01/04/2016 8.2 6.4 - 8.2 g/dL Final   12/22/2015 8.0 6.4 - 8.2 g/dL Final     Albumin   Date Value Ref  Range Status   01/23/2018 3.0 (L) 3.5 - 5.0 g/dL Final   12/27/2017 3.0 (L) 3.5 - 5.0 g/dL Final   11/24/2017 3.0 (L) 3.5 - 5.0 g/dL Final   01/04/2016 4.2 3.5 - 5.0 g/dL Final   12/22/2015 4.1 3.5 - 5.0 g/dL Final     Total Bilirubin   Date Value Ref Range Status   01/23/2018 0.20 0.20 - 1.00 mg/dL Final   12/27/2017 0.30 0.20 - 1.00 mg/dL Final   11/24/2017 0.20 0.20 - 1.00 mg/dL Final     Alkaline Phosphatase   Date Value Ref Range Status   01/23/2018 77 46 - 116 U/L Final   12/27/2017 83 46 - 116 U/L Final   11/24/2017 72 46 - 116 U/L Final   01/04/2016 94 46 - 116 U/L Final   12/22/2015 95 46 - 116 U/L Final     AST   Date Value Ref Range Status   01/23/2018 23 5 - 45 U/L Final     Comment:       Specimen collection should occur prior to Sulfasalazine administration due to the potential for falsely depressed results.    12/27/2017 18 5 - 45 U/L Final     Comment:       Specimen collection should occur prior to Sulfasalazine administration due to the potential for falsely depressed results.    11/24/2017 21 5 - 45 U/L Final     Comment:       Specimen collection should occur prior to Sulfasalazine administration due to the potential for falsely depressed results.    01/04/2016 14 5 - 45 U/L Final   12/22/2015 15 5 - 45 U/L Final     ALT   Date Value Ref Range Status   01/23/2018 40 12 - 78 U/L Final     Comment:       Specimen collection should occur prior to Sulfasalazine administration due to the potential for falsely depressed results.    12/27/2017 22 12 - 78 U/L Final     Comment:       Specimen collection should occur prior to Sulfasalazine administration due to the potential for falsely depressed results.    11/24/2017 25 12 - 78 U/L Final     Comment:       Specimen collection should occur prior to Sulfasalazine administration due to the potential for falsely depressed results.    01/04/2016 20 12 - 78 U/L Final   12/22/2015 12 12 - 78 U/L Final      LD (LDH)   Date Value Ref Range Status   01/04/2016  "236 (H) 81 - 234 U/L Final     Comment:     The above 1 analytes were performed by [facility]  [address] [place], [place] [address] [address]     12/22/2015 235 (H) 81 - 234 U/L Final     Comment:     The above 1 analytes were performed by [facility] Huffman[address] [place], [place] [address] [address]       LD   Date Value Ref Range Status   12/27/2017 175 81 - 234 U/L Final   11/24/2017 224 81 - 234 U/L Final   11/06/2017 188 81 - 234 U/L Final     No results found for: \"TSH\"  No results found for: \"K0VSWNJ\"   Free T4   Date Value Ref Range Status   01/23/2018 1.14 0.76 - 1.46 ng/dL Final     Comment:       Specimen collection should occur prior to Sulfasalazine administration due to the potential for falsely elevated results.   12/27/2017 0.95 0.76 - 1.46 ng/dL Final     Comment:       Specimen collection should occur prior to Sulfasalazine administration due to the potential for falsely elevated results.   11/24/2017 1.02 0.76 - 1.46 ng/dL Final     Comment:       Specimen collection should occur prior to Sulfasalazine administration due to the potential for falsely elevated results.   12/22/2015 0.81 0.76 - 1.46 ng/dL Final     Comment:     The above 1 analytes were performed by [address] [address]           RECENT IMAGING:  No results found.          Assessment/Plan  Mr. Goode is a 63-year-old gentleman with stage IV melanoma diagnosed 9 years ago status post treatment immunotherapy now here for continued monitoring, follow-up and surveillance.    1. Encounter for follow-up surveillance of melanoma  2. Metastatic melanoma (HCC)  3. Malignant melanoma metastatic to brain (HCC)  He is doing well with no clinical evidence of melanoma recurrence or metastasis.  He did have blood work done at an outside lab and it is visible in Care Everywhere.  All lab values are within normal limits or not clinically significant.  He knows to continue to monitor for any new, changing, concerning skin lesions or " lymphadenopathy.  He knows to monitor for any new lumps or bumps.    He will return in 1 year for follow-up.  Orders placed and prescription provided for blood work to be done at that time.  He knows to call with any issues or concerns prior to his next visit.        4. High risk medication use    Previously was on immunotherapy so we continue to monitor thyroid functions.  TSH is stable and he is on a stable dose of levothyroxine.          Return in about 1 year (around 7/10/2025) for Office Visit, labs.     Brooke Shepherd MD, PhD

## 2025-07-03 ENCOUNTER — TELEPHONE (OUTPATIENT)
Dept: HEMATOLOGY ONCOLOGY | Facility: CLINIC | Age: 65
End: 2025-07-03

## 2025-07-03 NOTE — TELEPHONE ENCOUNTER
Spoke with patient regarding labs needed to be drawn prior to appointment.  Indicated the scripts are in the system, they are non-fasting and patient can go to any [facility] facility to have the labs drawn.  Patient verbalized understanding.

## 2025-07-08 ENCOUNTER — TELEPHONE (OUTPATIENT)
Dept: HEMATOLOGY ONCOLOGY | Facility: CLINIC | Age: 65
End: 2025-07-08

## 2025-07-08 NOTE — TELEPHONE ENCOUNTER
Spoke with Cal indicating that Dr. Shepherd is rounding Thursday morning and we could offer appointments day prior or later same day. Provided new appointment of July 9th at 9:00am, one day earlier same time. Understanding verbalized.

## 2025-07-09 ENCOUNTER — OFFICE VISIT (OUTPATIENT)
Dept: HEMATOLOGY ONCOLOGY | Facility: CLINIC | Age: 65
End: 2025-07-09
Payer: MEDICARE

## 2025-07-09 VITALS
HEART RATE: 84 BPM | OXYGEN SATURATION: 98 % | HEIGHT: 74 IN | TEMPERATURE: 97.3 F | SYSTOLIC BLOOD PRESSURE: 160 MMHG | DIASTOLIC BLOOD PRESSURE: 80 MMHG | RESPIRATION RATE: 18 BRPM | WEIGHT: 245 LBS | BODY MASS INDEX: 31.44 KG/M2

## 2025-07-09 DIAGNOSIS — Z79.899 HIGH RISK MEDICATION USE: ICD-10-CM

## 2025-07-09 DIAGNOSIS — C43.9 METASTATIC MELANOMA (HCC): ICD-10-CM

## 2025-07-09 DIAGNOSIS — Z08 ENCOUNTER FOR FOLLOW-UP SURVEILLANCE OF MELANOMA: Primary | ICD-10-CM

## 2025-07-09 DIAGNOSIS — Z85.820 ENCOUNTER FOR FOLLOW-UP SURVEILLANCE OF MELANOMA: Primary | ICD-10-CM

## 2025-07-09 DIAGNOSIS — C79.31 MALIGNANT MELANOMA METASTATIC TO BRAIN (HCC): ICD-10-CM

## 2025-07-09 PROCEDURE — G2211 COMPLEX E/M VISIT ADD ON: HCPCS | Performed by: INTERNAL MEDICINE

## 2025-07-09 PROCEDURE — 99213 OFFICE O/P EST LOW 20 MIN: CPT | Performed by: INTERNAL MEDICINE

## 2025-07-09 NOTE — LETTER
July 10, 2025     Nola Simpson MD  [address] [facility] [address]  [facility] [address]    Patient: Luca Goode   YOB: 1960   Date of Visit: 7/9/2025       Dear Dr. Nola Simpson MD:    Thank you for referring Luca Goode to me for evaluation. Below are my notes for this consultation.    If you have questions, please do not hesitate to call me. I look forward to following your patient along with you.         Sincerely,        Brooke Shepherd MD        CC: No Recipients    Tobias Figueroa MD  7/9/2025  9:40 AM  Attested    Hematology/Oncology Outpatient Follow- up Note  Luca Goode 64 y.o. male MRN: @ Encounter: [id number]        Date:  7/9/2025      Assessment / Plan:      Encounter for follow-up surveillance of melanoma:  Initial diagnosis was in 2017 s/p immunotherapy currently on surveillence  Doing well without any complaints  Follow up after 1 year  Follow up with CBC, CMP, TSH, T3, T4, LD    Metastatic melanoma (HCC):  Has diagnosis of metastaic melanoma s/p immunotherapy currently on surveillence  No new skin lesions  CBC, CMP WNL  TSH 3.81, T3 2.77, T4 0.9    Not following with any dermatology    Plan:  Follow up yearly  Follow up with CBC, CMP, TSH, T3, T4, LD  Discussed to use sun screen and wear protective clothes when is going out for sun    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 male  who has metastatic melanoma treated with combination ipilimumab and nivolumab followed by maintenance nivolumab from [id number] who continues on surveillance. He was initially diagnosed in November of 2015.  He had an unknown primary.  He initially started having pain in his right kidney which  continue to worsen which prompted him to go to the emergency room.  He thought it was a kidney stone.  He had imaging that was concerning for potential renal cell carcinoma with masses in both his kidneys.  He underwent a biopsy which demonstrated melanoma.  He was found to have diffuse metastatic disease.  He was initially treated by Dr. Asael tejada on a clinical trial.  He received ipilimumab and nivolumab followed by maintenance nivolumab.  Records state that treatment was stopped early and healing receive 3 doses if ipilimumab and nivolumab due to colitis which resolved with steroids.  He also had anemia which improved.  According to the patient, he received all 4 doses of combination ipilimumab and nivolumab and then single agent therapy.  He has been doing well and remained in surveillance after completing treatment in 2017.  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  He also states that he has an allergy to contrast/iodinated dye. He is doing well and feels good.  He denies any new, changing, concerning skin lesions at this time.  Denies any lymphadenopathy.  We discussed that he had imaging done in April and June of 2022 which were negative for melanoma recurrence or metastasis.  He states that in his late teens and early 20s he lived at higher elevations.  He does describe having increased sunburns.  He does think he maybe had 1 blistering sunburn.  He denies any tanning bed use. He denies a family history of melanoma.  He denies a family history of breast, ovarian, and pancreatic cancer. He had brown hair when he was younger, has brown eyes, and is [place], [place], [place], and English descent.      Interval History/ Subjective:    He has been doing well. Eating well. His energy level is good and no issues. CBC, CMP WNL, TSH 3.81, T3 2.77, T4 0.9, . No new imaging. He still loves to travel and continues doing that.        Cancer Staging:  Cancer Staging   Metastatic melanoma (HCC)  Staging form:  Melanoma of the Skin, AJCC 7th Edition  - Clinical stage from 1/1/2015: Stage IV (T0, N0, M1c) - Signed by Brooke Shepherd MD on 12/4/2022  Biopsy of metastatic site performed: Yes      Previous Hematologic/ Oncologic History:    Oncology History   Metastatic melanoma (HCC)   1/1/2015 -  Cancer Staged    Staging form: Melanoma of the Skin, AJCC 7th Edition  - Clinical stage from 1/1/2015: Stage IV (T0, N0, M1c) - Signed by Brooke Shepherd MD on 12/4/2022  Biopsy of metastatic site performed: Yes       1/1/2015 - 1/1/2017 Chemotherapy    Treated on clinic trial with combination ipilimumab and nivolumab followed by maintenance nivolumab         Test Results:    Imaging: No results found.          Labs:   Lab Results   Component Value Date    WBC 6.85 01/23/2018    HGB 9.9 (L) 01/23/2018    HCT 32.0 (L) 01/23/2018    MCV 79 (L) 01/23/2018     01/23/2018     Lab Results   Component Value Date     01/04/2016    K 4.3 01/23/2018     01/23/2018    CO2 26 01/23/2018    ANIONGAP 8 01/04/2016    BUN 14 01/23/2018    CREATININE 1.21 01/23/2018    GLUCOSE 91 01/04/2016    CALCIUM 8.7 01/23/2018    AST 23 01/23/2018    ALT 40 01/23/2018    ALKPHOS 77 01/23/2018    PROT 8.2 01/04/2016    BILITOT 0.40 01/04/2016    EGFR 66 01/23/2018       ROS: Review of Systems   Constitutional:  Negative for chills and fever.   HENT:  Negative for ear pain and sore throat.    Eyes:  Negative for pain and visual disturbance.   Respiratory:  Negative for cough and shortness of breath.    Cardiovascular:  Negative for chest pain and palpitations.   Gastrointestinal:  Negative for abdominal pain and vomiting.   Genitourinary:  Negative for dysuria and hematuria.   Musculoskeletal:  Negative for arthralgias and back pain.   Skin:  Negative for color change and rash.   Neurological:  Negative for seizures and syncope.   All other systems reviewed and are negative.      Current Medications: Reviewed  Allergies:  Reviewed  PMH/FH/SH:  Reviewed    Physical Exam:    Body surface area is 2.37 meters squared.    Wt Readings from Last 3 Encounters:   07/09/25 111 kg (245 lb)   07/10/24 108 kg (239 lb)   07/06/23 105 kg (231 lb 8 oz)        Temp Readings from Last 3 Encounters:   07/09/25 (!) 97.3 °F (36.3 °C) (Temporal)   07/10/24 98.3 °F (36.8 °C) (Temporal)   07/06/23 98.2 °F (36.8 °C) (Temporal)        BP Readings from Last 3 Encounters:   07/09/25 160/80   07/10/24 136/74   07/06/23 160/88         Pulse Readings from Last 3 Encounters:   07/09/25 84   07/10/24 88   07/06/23 82       Physical Exam  Vitals and nursing note reviewed.   Constitutional:       General: He is not in acute distress.     Appearance: Normal appearance. He is not ill-appearing.   HENT:      Head: Normocephalic.      Mouth/Throat:      Mouth: Mucous membranes are moist.      Pharynx: Oropharynx is clear.     Eyes:      Extraocular Movements: Extraocular movements intact.      Conjunctiva/sclera: Conjunctivae normal.      Pupils: Pupils are equal, round, and reactive to light.     Neck:      Vascular: No carotid bruit.     Cardiovascular:      Rate and Rhythm: Normal rate and regular rhythm.      Pulses: Normal pulses.      Heart sounds: Normal heart sounds. No murmur heard.  Pulmonary:      Effort: Pulmonary effort is normal. No respiratory distress.      Breath sounds: Normal breath sounds. No wheezing or rales.   Abdominal:      General: There is no distension.      Palpations: Abdomen is soft. There is no mass.      Tenderness: There is no abdominal tenderness. There is no guarding.      Hernia: No hernia is present.     Musculoskeletal:         General: No swelling.      Cervical back: Normal range of motion. No rigidity or tenderness.      Right lower leg: No edema.      Left lower leg: No edema.   Lymphadenopathy:      Cervical: No cervical adenopathy.     Skin:     Findings: No lesion or rash.     Neurological:      Mental Status: He is alert and  oriented to person, place, and time. Mental status is at baseline.     Psychiatric:         Mood and Affect: Mood normal.         Behavior: Behavior normal.         Tobias Figueroa MD  Internal Medicine Resident, PGY3  [facility]           Attestation with edits by Brooke Shepherd MD at 7/10/2025  7:30 PM:  Attending Attestation:    I reviewed the care furnished by the Resident/Fellow during the visit on 7/9/2025.  I was present in the office and personally saw and examined the patient.    Mr. Lindsay is a 64-year-old gentleman with metastatic melanoma originally treated with combination ipilimumab and nivolumab and single agent nivolumab on a clinical trial approximately 10 years ago here for continued monitoring, follow-up and surveillance.  He is doing well.  No issues concerns or complaints.  No concerning skin lesions.  No lymphadenopathy.  Thinks his vitiligo is improving recently.    Exam ECOG PS 0.  No concerning skin lesions.  Well-healed surgical scar with no lymphadenopathy.  No lower extremity swelling.  Lungs clear to auscultation bilaterally.  Regular rate and rhythm no murmurs rubs or gallops.      Mr. Shankar Ojeda is a 64-year-old gentleman with metastatic melanoma status post treatment with combination ipilimumab and nivolumab followed by single agent nivolumab approximately 10 years ago here for continued monitoring, follow-up and surveillance.  He is doing well.  No clinical evidence of disease recurrence.  Labs reviewed and okay.  He knows to continue to monitor for any new, changing, concerning skin lesions or lymphadenopathy.  He knows to monitor for just feeling overall unwell or change in his wellbeing.  Will continue to monitor him yearly.  He will return with blood work at that time.  Orders placed.  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  Currently on stable dose of  levothyroxine.    Brooke Shepherd MD, PhD

## 2025-07-09 NOTE — PROGRESS NOTES
Hematology/Oncology Outpatient Follow- up Note  Luca Goode 64 y.o. male MRN: @ Encounter: [id number]        Date:  7/9/2025      Assessment / Plan:      Encounter for follow-up surveillance of melanoma:  Initial diagnosis was in 2017 s/p immunotherapy currently on surveillence  Doing well without any complaints  Follow up after 1 year  Follow up with CBC, CMP, TSH, T3, T4, LD    Metastatic melanoma (HCC):  Has diagnosis of metastaic melanoma s/p immunotherapy currently on surveillence  No new skin lesions  CBC, CMP WNL  TSH 3.81, T3 2.77, T4 0.9    Not following with any dermatology    Plan:  Follow up yearly  Follow up with CBC, CMP, TSH, T3, T4, LD  Discussed to use sun screen and wear protective clothes when is going out for sun    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 male  who has metastatic melanoma treated with combination ipilimumab and nivolumab followed by maintenance nivolumab from [id number] who continues on surveillance. He was initially diagnosed in November of 2015.  He had an unknown primary.  He initially started having pain in his right kidney which continue to worsen which prompted him to go to the emergency room.  He thought it was a kidney stone.  He had imaging that was concerning for potential renal cell carcinoma with masses in both his kidneys.  He underwent a biopsy which demonstrated melanoma.  He was found to have diffuse metastatic disease.  He was initially treated by Dr. Asael tejada on a clinical trial.  He received ipilimumab and nivolumab followed by maintenance nivolumab.  Records state that treatment was stopped early and healing receive 3 doses if ipilimumab and nivolumab due to  colitis which resolved with steroids.  He also had anemia which improved.  According to the patient, he received all 4 doses of combination ipilimumab and nivolumab and then single agent therapy.  He has been doing well and remained in surveillance after completing treatment in 2017.  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  He also states that he has an allergy to contrast/iodinated dye. He is doing well and feels good.  He denies any new, changing, concerning skin lesions at this time.  Denies any lymphadenopathy.  We discussed that he had imaging done in April and June of 2022 which were negative for melanoma recurrence or metastasis.  He states that in his late teens and early 20s he lived at higher elevations.  He does describe having increased sunburns.  He does think he maybe had 1 blistering sunburn.  He denies any tanning bed use. He denies a family history of melanoma.  He denies a family history of breast, ovarian, and pancreatic cancer. He had brown hair when he was younger, has brown eyes, and is Urdu, [place], [place], and English descent.      Interval History/ Subjective:    He has been doing well. Eating well. His energy level is good and no issues. CBC, CMP WNL, TSH 3.81, T3 2.77, T4 0.9, . No new imaging. He still loves to travel and continues doing that.        Cancer Staging:  Cancer Staging   Metastatic melanoma (HCC)  Staging form: Melanoma of the Skin, AJCC 7th Edition  - Clinical stage from 1/1/2015: Stage IV (T0, N0, M1c) - Signed by Brooke Shepherd MD on 12/4/2022  Biopsy of metastatic site performed: Yes      Previous Hematologic/ Oncologic History:    Oncology History   Metastatic melanoma (HCC)   1/1/2015 -  Cancer Staged    Staging form: Melanoma of the Skin, AJCC 7th Edition  - Clinical stage from 1/1/2015: Stage IV (T0, N0, M1c) - Signed by Brooke Shepherd MD on 12/4/2022  Biopsy of metastatic site performed: Yes       1/1/2015 - 1/1/2017 Chemotherapy    Treated on  clinic trial with combination ipilimumab and nivolumab followed by maintenance nivolumab         Test Results:    Imaging: No results found.          Labs:   Lab Results   Component Value Date    WBC 6.85 01/23/2018    HGB 9.9 (L) 01/23/2018    HCT 32.0 (L) 01/23/2018    MCV 79 (L) 01/23/2018     01/23/2018     Lab Results   Component Value Date     01/04/2016    K 4.3 01/23/2018     01/23/2018    CO2 26 01/23/2018    ANIONGAP 8 01/04/2016    BUN 14 01/23/2018    CREATININE 1.21 01/23/2018    GLUCOSE 91 01/04/2016    CALCIUM 8.7 01/23/2018    AST 23 01/23/2018    ALT 40 01/23/2018    ALKPHOS 77 01/23/2018    PROT 8.2 01/04/2016    BILITOT 0.40 01/04/2016    EGFR 66 01/23/2018       ROS: Review of Systems   Constitutional:  Negative for chills and fever.   HENT:  Negative for ear pain and sore throat.    Eyes:  Negative for pain and visual disturbance.   Respiratory:  Negative for cough and shortness of breath.    Cardiovascular:  Negative for chest pain and palpitations.   Gastrointestinal:  Negative for abdominal pain and vomiting.   Genitourinary:  Negative for dysuria and hematuria.   Musculoskeletal:  Negative for arthralgias and back pain.   Skin:  Negative for color change and rash.   Neurological:  Negative for seizures and syncope.   All other systems reviewed and are negative.      Current Medications: Reviewed  Allergies: Reviewed  PMH/FH/SH:  Reviewed    Physical Exam:    Body surface area is 2.37 meters squared.    Wt Readings from Last 3 Encounters:   07/09/25 111 kg (245 lb)   07/10/24 108 kg (239 lb)   07/06/23 105 kg (231 lb 8 oz)        Temp Readings from Last 3 Encounters:   07/09/25 (!) 97.3 °F (36.3 °C) (Temporal)   07/10/24 98.3 °F (36.8 °C) (Temporal)   07/06/23 98.2 °F (36.8 °C) (Temporal)        BP Readings from Last 3 Encounters:   07/09/25 160/80   07/10/24 136/74   07/06/23 160/88         Pulse Readings from Last 3 Encounters:   07/09/25 84   07/10/24 88   07/06/23 82        Physical Exam  Vitals and nursing note reviewed.   Constitutional:       General: He is not in acute distress.     Appearance: Normal appearance. He is not ill-appearing.   HENT:      Head: Normocephalic.      Mouth/Throat:      Mouth: Mucous membranes are moist.      Pharynx: Oropharynx is clear.     Eyes:      Extraocular Movements: Extraocular movements intact.      Conjunctiva/sclera: Conjunctivae normal.      Pupils: Pupils are equal, round, and reactive to light.     Neck:      Vascular: No carotid bruit.     Cardiovascular:      Rate and Rhythm: Normal rate and regular rhythm.      Pulses: Normal pulses.      Heart sounds: Normal heart sounds. No murmur heard.  Pulmonary:      Effort: Pulmonary effort is normal. No respiratory distress.      Breath sounds: Normal breath sounds. No wheezing or rales.   Abdominal:      General: There is no distension.      Palpations: Abdomen is soft. There is no mass.      Tenderness: There is no abdominal tenderness. There is no guarding.      Hernia: No hernia is present.     Musculoskeletal:         General: No swelling.      Cervical back: Normal range of motion. No rigidity or tenderness.      Right lower leg: No edema.      Left lower leg: No edema.   Lymphadenopathy:      Cervical: No cervical adenopathy.     Skin:     Findings: No lesion or rash.     Neurological:      Mental Status: He is alert and oriented to person, place, and time. Mental status is at baseline.     Psychiatric:         Mood and Affect: Mood normal.         Behavior: Behavior normal.         Tobias Figueroa MD  Internal Medicine Resident, PGY3  [facility]